# Patient Record
Sex: FEMALE | Race: WHITE | NOT HISPANIC OR LATINO | Employment: UNEMPLOYED | URBAN - METROPOLITAN AREA
[De-identification: names, ages, dates, MRNs, and addresses within clinical notes are randomized per-mention and may not be internally consistent; named-entity substitution may affect disease eponyms.]

---

## 2018-01-17 ENCOUNTER — CONVERSION ENCOUNTER (OUTPATIENT)
Dept: MAMMOGRAPHY | Facility: HOSPITAL | Age: 63
End: 2018-01-17

## 2018-01-25 ENCOUNTER — OFFICE VISIT CONVERTED (OUTPATIENT)
Dept: SURGERY | Facility: CLINIC | Age: 63
End: 2018-01-25
Attending: PHYSICIAN ASSISTANT

## 2018-01-25 ENCOUNTER — CONVERSION ENCOUNTER (OUTPATIENT)
Dept: SURGERY | Facility: CLINIC | Age: 63
End: 2018-01-25

## 2018-03-08 ENCOUNTER — PROCEDURE VISIT CONVERTED (OUTPATIENT)
Dept: UROLOGY | Facility: CLINIC | Age: 63
End: 2018-03-08
Attending: UROLOGY

## 2018-07-13 ENCOUNTER — CONVERSION ENCOUNTER (OUTPATIENT)
Dept: MAMMOGRAPHY | Facility: HOSPITAL | Age: 63
End: 2018-07-13

## 2019-01-26 ENCOUNTER — HOSPITAL ENCOUNTER (OUTPATIENT)
Dept: MAMMOGRAPHY | Facility: HOSPITAL | Age: 64
Discharge: HOME | End: 2019-01-26
Attending: NURSE PRACTITIONER

## 2019-02-11 ENCOUNTER — HOSPITAL ENCOUNTER (OUTPATIENT)
Dept: MAMMOGRAPHY | Facility: HOSPITAL | Age: 64
Discharge: HOME | End: 2019-02-11
Attending: NURSE PRACTITIONER

## 2019-02-15 ENCOUNTER — HOSPITAL ENCOUNTER (OUTPATIENT)
Dept: OTHER | Facility: HOSPITAL | Age: 64
Discharge: HOME | End: 2019-02-15
Attending: NURSE PRACTITIONER

## 2019-03-19 ENCOUNTER — HOSPITAL ENCOUNTER (OUTPATIENT)
Dept: URGENT CARE | Facility: CLINIC | Age: 64
Discharge: HOME | End: 2019-03-19

## 2019-05-16 ENCOUNTER — HOSPITAL ENCOUNTER (OUTPATIENT)
Dept: URGENT CARE | Facility: CLINIC | Age: 64
Discharge: HOME | End: 2019-05-16
Attending: PHYSICIAN ASSISTANT

## 2019-06-12 ENCOUNTER — OFFICE VISIT CONVERTED (OUTPATIENT)
Dept: UROLOGY | Facility: CLINIC | Age: 64
End: 2019-06-12
Attending: UROLOGY

## 2019-08-02 ENCOUNTER — HOSPITAL ENCOUNTER (OUTPATIENT)
Dept: URGENT CARE | Facility: CLINIC | Age: 64
Discharge: HOME | End: 2019-08-02

## 2019-08-04 LAB — BACTERIA SPEC AEROBE CULT: NORMAL

## 2019-08-07 ENCOUNTER — HOSPITAL ENCOUNTER (OUTPATIENT)
Dept: URGENT CARE | Facility: CLINIC | Age: 64
Discharge: HOME | End: 2019-08-07

## 2019-08-07 LAB — GLUCOSE BLD-MCNC: 135 MG/DL (ref 65–99)

## 2019-10-06 ENCOUNTER — HOSPITAL ENCOUNTER (OUTPATIENT)
Dept: URGENT CARE | Facility: CLINIC | Age: 64
Discharge: HOME | End: 2019-10-06
Attending: EMERGENCY MEDICINE

## 2019-12-18 ENCOUNTER — CONVERSION ENCOUNTER (OUTPATIENT)
Dept: ORTHOPEDIC SURGERY | Facility: CLINIC | Age: 64
End: 2019-12-18

## 2019-12-18 ENCOUNTER — OFFICE VISIT CONVERTED (OUTPATIENT)
Dept: ORTHOPEDIC SURGERY | Facility: CLINIC | Age: 64
End: 2019-12-18
Attending: ORTHOPAEDIC SURGERY

## 2020-01-26 ENCOUNTER — HOSPITAL ENCOUNTER (OUTPATIENT)
Dept: URGENT CARE | Facility: CLINIC | Age: 65
Discharge: HOME | End: 2020-01-26

## 2020-01-29 LAB — BACTERIA SPEC AEROBE CULT: NORMAL

## 2020-05-05 ENCOUNTER — HOSPITAL ENCOUNTER (OUTPATIENT)
Dept: GENERAL RADIOLOGY | Facility: HOSPITAL | Age: 65
Discharge: HOME | End: 2020-05-05
Attending: NURSE PRACTITIONER

## 2020-05-21 ENCOUNTER — HOSPITAL ENCOUNTER (OUTPATIENT)
Dept: MRI IMAGING | Facility: HOSPITAL | Age: 65
Discharge: HOME | End: 2020-05-21
Attending: FAMILY MEDICINE

## 2020-05-26 ENCOUNTER — OFFICE VISIT CONVERTED (OUTPATIENT)
Dept: NEUROSURGERY | Facility: CLINIC | Age: 65
End: 2020-05-26
Attending: PHYSICIAN ASSISTANT

## 2020-05-26 ENCOUNTER — CONVERSION ENCOUNTER (OUTPATIENT)
Dept: OTHER | Facility: HOSPITAL | Age: 65
End: 2020-05-26

## 2020-06-19 ENCOUNTER — HOSPITAL ENCOUNTER (OUTPATIENT)
Dept: PREADMISSION TESTING | Facility: HOSPITAL | Age: 65
Discharge: HOME | End: 2020-06-19
Attending: NEUROLOGICAL SURGERY

## 2020-06-23 ENCOUNTER — HOSPITAL ENCOUNTER (OUTPATIENT)
Dept: PERIOP | Facility: HOSPITAL | Age: 65
Setting detail: HOSPITAL OUTPATIENT SURGERY
Discharge: HOME | End: 2020-06-23
Attending: NEUROLOGICAL SURGERY

## 2020-06-26 ENCOUNTER — HOSPITAL ENCOUNTER (OUTPATIENT)
Dept: PERIOP | Facility: HOSPITAL | Age: 65
Setting detail: HOSPITAL OUTPATIENT SURGERY
Discharge: HOME | End: 2020-06-26
Attending: NEUROLOGICAL SURGERY

## 2020-06-26 LAB
GLUCOSE BLD-MCNC: 166 MG/DL (ref 65–99)
GLUCOSE BLD-MCNC: 98 MG/DL (ref 65–99)
SARS-COV-2 RNA SPEC QL NAA+PROBE: NOT DETECTED

## 2020-07-14 ENCOUNTER — OFFICE VISIT CONVERTED (OUTPATIENT)
Dept: NEUROSURGERY | Facility: CLINIC | Age: 65
End: 2020-07-14
Attending: PHYSICIAN ASSISTANT

## 2020-07-28 ENCOUNTER — OFFICE VISIT CONVERTED (OUTPATIENT)
Dept: NEUROSURGERY | Facility: CLINIC | Age: 65
End: 2020-07-28
Attending: PHYSICIAN ASSISTANT

## 2020-07-28 ENCOUNTER — CONVERSION ENCOUNTER (OUTPATIENT)
Dept: NEUROLOGY | Facility: CLINIC | Age: 65
End: 2020-07-28

## 2020-08-14 ENCOUNTER — HOSPITAL ENCOUNTER (OUTPATIENT)
Dept: OTHER | Facility: HOSPITAL | Age: 65
Discharge: HOME | End: 2020-08-14
Attending: PHYSICIAN ASSISTANT

## 2020-08-17 ENCOUNTER — HOSPITAL ENCOUNTER (OUTPATIENT)
Dept: URGENT CARE | Facility: CLINIC | Age: 65
Discharge: HOME | End: 2020-08-17
Attending: PHYSICIAN ASSISTANT

## 2020-08-17 LAB — GLUCOSE BLD-MCNC: 122 MG/DL (ref 65–99)

## 2020-09-10 ENCOUNTER — HOSPITAL ENCOUNTER (OUTPATIENT)
Dept: MRI IMAGING | Facility: HOSPITAL | Age: 65
Discharge: HOME | End: 2020-09-10
Attending: PHYSICIAN ASSISTANT

## 2020-09-10 LAB
CREAT BLD-MCNC: 0.8 MG/DL (ref 0.6–1.4)
GFR SERPLBLD BASED ON 1.73 SQ M-ARVRAT: >60 ML/MIN/{1.73_M2}

## 2020-09-26 ENCOUNTER — HOSPITAL ENCOUNTER (OUTPATIENT)
Dept: URGENT CARE | Facility: CLINIC | Age: 65
Discharge: HOME | End: 2020-09-26
Attending: EMERGENCY MEDICINE

## 2020-10-12 ENCOUNTER — OFFICE VISIT CONVERTED (OUTPATIENT)
Dept: SURGERY | Facility: CLINIC | Age: 65
End: 2020-10-12
Attending: SURGERY

## 2020-10-17 ENCOUNTER — HOSPITAL ENCOUNTER (OUTPATIENT)
Dept: PREADMISSION TESTING | Facility: HOSPITAL | Age: 65
Discharge: HOME | End: 2020-10-17
Attending: SURGERY

## 2020-10-19 LAB — SARS-COV-2 RNA SPEC QL NAA+PROBE: NOT DETECTED

## 2020-10-22 ENCOUNTER — HOSPITAL ENCOUNTER (OUTPATIENT)
Dept: PERIOP | Facility: HOSPITAL | Age: 65
Setting detail: HOSPITAL OUTPATIENT SURGERY
Discharge: HOME | End: 2020-10-22
Attending: SURGERY

## 2020-10-22 LAB
GLUCOSE BLD-MCNC: 105 MG/DL (ref 65–99)
GLUCOSE BLD-MCNC: 164 MG/DL (ref 65–99)

## 2020-11-02 ENCOUNTER — OFFICE VISIT CONVERTED (OUTPATIENT)
Dept: SURGERY | Facility: CLINIC | Age: 65
End: 2020-11-02
Attending: SURGERY

## 2020-11-19 ENCOUNTER — TELEMEDICINE CONVERTED (OUTPATIENT)
Dept: NEUROSURGERY | Facility: CLINIC | Age: 65
End: 2020-11-19
Attending: NEUROLOGICAL SURGERY

## 2020-12-17 ENCOUNTER — CONVERSION ENCOUNTER (OUTPATIENT)
Dept: SURGERY | Facility: CLINIC | Age: 65
End: 2020-12-17

## 2020-12-17 ENCOUNTER — OFFICE VISIT CONVERTED (OUTPATIENT)
Dept: SURGERY | Facility: CLINIC | Age: 65
End: 2020-12-17
Attending: SURGERY

## 2020-12-18 ENCOUNTER — HOSPITAL ENCOUNTER (OUTPATIENT)
Dept: LAB | Facility: HOSPITAL | Age: 65
Discharge: HOME | End: 2020-12-18
Attending: SURGERY

## 2020-12-18 LAB
APPEARANCE UR: CLEAR
BILIRUB UR QL: NEGATIVE
COLOR UR: YELLOW
CONV BACTERIA: NEGATIVE
CONV COLLECTION SOURCE (UA): ABNORMAL
CONV UROBILINOGEN IN URINE BY AUTOMATED TEST STRIP: 0.2 {EHRLICHU}/DL (ref 0.1–1)
GLUCOSE UR QL: 100 MG/DL
HGB UR QL STRIP: ABNORMAL
KETONES UR QL STRIP: ABNORMAL MG/DL
LEUKOCYTE ESTERASE UR QL STRIP: NEGATIVE
NITRITE UR QL STRIP: NEGATIVE
PH UR STRIP.AUTO: 5 [PH] (ref 5–8)
PROT UR QL: NEGATIVE MG/DL
RBC #/AREA URNS HPF: ABNORMAL /[HPF]
SP GR UR: 1.02 (ref 1–1.03)
WBC #/AREA URNS HPF: ABNORMAL /[HPF]

## 2021-01-06 ENCOUNTER — HOSPITAL ENCOUNTER (OUTPATIENT)
Dept: GENERAL RADIOLOGY | Facility: HOSPITAL | Age: 66
Discharge: HOME | End: 2021-01-06
Attending: NURSE PRACTITIONER

## 2021-01-14 ENCOUNTER — HOSPITAL ENCOUNTER (OUTPATIENT)
Dept: MAMMOGRAPHY | Facility: HOSPITAL | Age: 66
Discharge: HOME | End: 2021-01-14
Attending: FAMILY MEDICINE

## 2021-02-04 ENCOUNTER — CONVERSION ENCOUNTER (OUTPATIENT)
Dept: SURGERY | Facility: CLINIC | Age: 66
End: 2021-02-04

## 2021-02-04 ENCOUNTER — OFFICE VISIT CONVERTED (OUTPATIENT)
Dept: UROLOGY | Facility: CLINIC | Age: 66
End: 2021-02-04
Attending: NURSE PRACTITIONER

## 2021-02-04 LAB
BILIRUB UR QL STRIP: NEGATIVE
COLOR UR: YELLOW
CONV BACTERIA IN URINE MICRO: 0
CONV CALCIUM OXALATE CRYSTALS /HPF IN URINE SEDIMENT BY MICROSCOPY: 0
CONV CLARITY OF URINE: CLEAR
CONV PROTEIN IN URINE BY AUTOMATED TEST STRIP: NEGATIVE
CONV UROBILINOGEN IN URINE BY AUTOMATED TEST STRIP: 0.2
GLUCOSE UR QL: NEGATIVE
HGB UR QL STRIP: NORMAL
KETONES UR QL STRIP: NEGATIVE
LEUKOCYTE ESTERASE UR QL STRIP: NEGATIVE
NITRITE UR QL STRIP: NEGATIVE
PH UR STRIP.AUTO: 5 [PH]
RBC #/AREA URNS HPF: 0 /[HPF]
RENAL EPI CELLS #/AREA URNS HPF: 0 /[HPF]
SP GR UR: >=1.03
SQUAMOUS SPT QL MICRO: 0
WBC #/AREA URNS HPF: 0 /[HPF]

## 2021-03-10 ENCOUNTER — OFFICE VISIT CONVERTED (OUTPATIENT)
Dept: SURGERY | Facility: CLINIC | Age: 66
End: 2021-03-10
Attending: NURSE PRACTITIONER

## 2021-03-18 ENCOUNTER — HOSPITAL ENCOUNTER (OUTPATIENT)
Dept: GASTROENTEROLOGY | Facility: HOSPITAL | Age: 66
Setting detail: HOSPITAL OUTPATIENT SURGERY
Discharge: HOME | End: 2021-03-18
Attending: SURGERY

## 2021-03-18 LAB — GLUCOSE BLD-MCNC: 137 MG/DL (ref 65–99)

## 2021-03-25 ENCOUNTER — CONVERSION ENCOUNTER (OUTPATIENT)
Dept: SURGERY | Facility: CLINIC | Age: 66
End: 2021-03-25

## 2021-03-25 ENCOUNTER — OFFICE VISIT CONVERTED (OUTPATIENT)
Dept: SURGERY | Facility: CLINIC | Age: 66
End: 2021-03-25
Attending: NURSE PRACTITIONER

## 2021-04-29 ENCOUNTER — OFFICE VISIT CONVERTED (OUTPATIENT)
Dept: SURGERY | Facility: CLINIC | Age: 66
End: 2021-04-29
Attending: SURGERY

## 2021-05-10 NOTE — H&P
History and Physical      Patient Name: Jada Butler   Patient ID: [id number]   Sex: Female   YOB: 1955    Primary Care Provider: Ellis Mendoza MD   Referring Provider: Ellis Mendoza MD    Visit Date: May 26, 2020    Provider: Samira Barnett PA-C   Location: [facility]   Location Address: [address] [place] [address]  [id number]   Location Phone: [phone]          Chief Complaint  · Back and right leg pain      History Of Present Illness  The patient is a 64 year old /White female, who presents on referral from Ellis Mendoza MD, for a neurosurgical evaluation of low back pain and right leg pain.   The right leg pain involves the right S1 distribution. The pain developed gradually one month ago with NKI It is severe (7-8/10) has an aching and a sharp quality and radiates into the right S1 distribution. The pain has been constant. The pain tends to be maximal at no specific time, but waxes and wanes in severity throughout the day. The patient states the pain is aggravated by prolonged sitting. Improves some with heat and lying done.   She also reports paresthesias in the right lower leg posteriorly. She denies bowel or bladder dysfunction and. The patient's past medical history is notable for prior lumbar spine surgery. She underwent a laminotomy at L4-5 on the left approximately 4 weeks ago.   RECENT INTERVENTIONS:  She has been previously treated with pain medication and oral prednisone (caused headache so d/c prednisone).   INFORMATION REVIEWED:  The following information was reviewed: radiology reports and images and. MRI of the lumbar spine and revealed right L5/S1 disc protrusion with moderate central canal stenosis at L4/5 with history of laminectomy at L4/5. She has multilevel degenerative changes. These were the most notable findings.       Past Medical History  Allergic rhinitis; Anemia, Unspecified; Arthritis; Degeneration of lumbar intervertebral disc;  Diabetes; Diabetes mellitus, type II; Displacement of lumbar intervertebral disc; GERD; History of arthroscopy of left knee; Hyperlipemia; Hyperlipidemia; Hypertension; Impaired fasting glucose; Insomnia; Lateral meniscus tear, current, left; Left Knee Osteoarthritis ; Low back pain; Lower abdominal pain; Lumbago/low back pain; Lumbar Spinal Stenosis; Microscopic hematuria; Mood swings; Radiculopathy, lumbosacral; Reflux; Seasonal allergies; Ulcer         Past Surgical History  Artificial Joints/Limbs; Back; breast reduction; Colonoscopy; Cystoscopy; Hernia; Hernia Repair; Hysterectomy; Joint Surgery; Lumbar laminectomy; Metal implants; Sinus Surgery; Tubal ligation; Wrist Surgery         Medication List  Claritin oral; dicyclomine 10 mg oral capsule; dicyclomine 20 mg oral tablet; fluoxetine 20 mg oral capsule; glimepiride 2 mg oral tablet; losartan 100 mg oral tablet; metformin 500 mg oral tablet extended release 24 hr; omeprazole 40 mg oral capsule,delayed release(DR/EC); pravastatin 20 mg oral tablet; Prilosec oral; propranolol 80 mg oral capsule,extended release 24 hr; Prozac 20 mg oral capsule; Vitamin D2 50,000 unit oral capsule         Allergy List  Augmentin; Mobic       Allergies Reconciled  Family Medical History  Stroke; Heart Disease; Cancer, Unspecified; Diabetes, unspecified type; Family history of certain chronic disabling diseases; arthritis; Family history of Arthritis         Reproductive History   0 Para 0 0 0 0 & Postmenopausal       Social History  Alcohol Use (Current some day); Homemaker.; lives with spouse; .; Recreational Drug Use (Never); Retired.; Tobacco (Former); Unemployed.         Review of Systems  · Constitutional  o Denies  o : chills, excessive sweating, fatigue, fever, sycope/passing out, weight gain, weight loss  · Eyes  o Denies  o : changes in vision, blurry vision, double vision  · HENT  o Admits  o : ringing in the ears, seasonal allergies  o Denies  o : loss  of hearing, ear aches, sore throat, nasal congestion, sinus pain, nose bleeds  · Cardiovascular  o Denies  o : blood clots, swollen legs, anemia, easy burising or bleeding, transfusions  · Respiratory  o Denies  o : shortness of breath, dry cough, productive cough, pneumonia, COPD  · Gastrointestinal  o Admits  o : reflux  o Denies  o : difficulty swallowing  · Genitourinary  o Denies  o : incontinence  · Neurologic  o Admits  o : tremor  o Denies  o : headache, seizure, stroke, loss of balance, falls, dizziness/vertigo, difficulty with sleep, numbness/tingling/paresthesia , difficulty with coordination, difficulty with dexterity, weakness  · Musculoskeletal  o Admits  o : sciatica, pain radiating in leg, low back pain  o Denies  o : neck stiffness/pain, swollen lymph nodes, muscle aches, joint pain, weakness, spasms, pain radiating in arm  · Endocrine  o Admits  o : diabetes  o Denies  o : thyroid disorder  · Psychiatric  o Denies  o : anxiety, depression  · All Others Negative      Vitals  Date Time BP Position Site L\R Cuff Size HR RR TEMP (F) WT  HT  BMI kg/m2 BSA m2 O2 Sat HC       05/26/2020 01:49 PM        97.5         05/26/2020 02:26 /66 Sitting    73 - R  97.5 175lbs 2oz              Physical Examination  · Constitutional  o Appearance  o : well-nourished, well developed, alert, in no acute distress  · Respiratory  o Respiratory Effort  o : breathing unlabored  · Cardiovascular  o Peripheral Vascular System  o :   § Extremities  § : no edema or cyanosis  · Musculoskeletal  o Spine  o :   § Inspection/Palpation  § : ttp in the lower lumbar region  o Right Lower Extremity  o :   § Inspection/Palpation  § : no joint or limb tenderness to palpation, no edema present, no ecchymosis  § Joint Stability  § : joint stability within normal limits  § Range of Motion  § : range of motion normal, no joint crepitations present, no pain on motion, Sheng's test negative  o Left Lower Extremity  o :    § Inspection/Palpation  § : no joint or limb tenderness to palpation, no edema present, no ecchymosis  § Joint Stability  § : joint stability within normal limits  § Range of Motion  § : range of motion normal, no joint crepitations present, no pain on motion, Sheng's test negative  · Skin and Subcutaneous Tissue  o Extremities  o :   § Right Lower Extremity  § : no lesions or areas of discoloration  § Left Lower Extremity  § : no lesions or areas of discoloration  o Back  o : no lesions or areas of discoloration  · Neurologic  o Mental Status Examination  o :   § Orientation  § : alert and oriented to time, person, place and events  o Motor Examination  o :   § RLE Strength  § : strength normal  § RLE Motor Function  § : tone normal, no atrophy, no abnormal movements noted  § LLE Strength  § : strength normal  § LLE Motor Function  § : tone normal, no atrophy, no abnormal movements noted  o Reflexes  o :   § RLE  § : knee reflexes 1/4 knee and 0 at ankle, SLR positive  § LLE  § : knee 0/4 and 1/4 ankle, SLR negative  o Sensation  o :   § Light Touch  § : sensation intact to light touch in extremities  o Gait and Station  o :   § Gait Screening  § : normal gait, able to stand without difficulty  · Psychiatric  o Mood and Affect  o : mood normal, affect appropriate          Assessment  · Pre-op examination     V72.84/Z01.818  · Degeneration of lumbar intervertebral disc     722.52/M51.36  · Lumbago/low back pain     724.3/M54.40  · Lumbar Spinal Stenosis     724.02/M48.06  · Lumbosacral disc herniation, L5-S1     722.10/M51.27    Problems Reconciled  Plan  · Orders  o ISIS Report ([facility]) - - 05/26/2020  · Medications  o tramadol 50 mg oral tablet   SIG: take 1 tablet by oral route 3 times a day as needed for 15 days   DISP: (45) tablets with 0 refills  Prescribed on 05/26/2020     o Medications have been Reconciled  o Transition of Care or Provider Policy  · Instructions  o ****Surgical  Orders****  o Outpatient  o RISK AND BENEFITS:  o Possible risks/complications, benefits and alternatives to surgical or invasive procedure have been explained to the patient and/or legal guardian.  o Patient has been evaluated and can tolerate anesthesia and/or sedation. Risks, benefits, and alternatives to anesthesia and sedation have been explained to the patient and/or legal guardian.  o ***************  o PREP: Per protocol;  o IV: Per Anesthesia;  o *******************************************  o PRE- OP MEDICATION ORDER:  o *******************************************  o Clindamycin 900 mg IV on call to OR.  o Date of Surgical Procedure:   o Please sign permit for:  o Right Approach Minimally Invasive Discetomy,  o lumbar five to sacral one  o The above History and Physical must have been completed within 30 days of admission.  o Encouraged to follow-up with Primary Care Provider for preventative care.  o The ROS and the PFSH were reviewed at today's visit.  o I have discussed the risks and benefits of surgery versus physical therapy, epidural steroids, and other conservative forms of treatment.  o Call or return to office if symptoms worsen or persist.  o Dr. Cueva also saw her in clinic today and discussed with her the option of a right L5/S1 MID. She would like to proceed with surgery.   · Associate Tasks  o Task ID [id number] General Task: david and Dr. Cueva said ok to call in tramadol to her pharmacy per facesheet  o Task ID [id number] General Task: surgery needs to be pre-cert and then scheduled            Electronically Signed by: ALMA Diaz-YESSENIA -Author on May 26, 2020 03:03:49 PM  Electronically Co-signed by: Carlyle Cueva MD -Reviewer on May 26, 2020 04:11:22 PM

## 2021-05-10 NOTE — H&P
History and Physical      Patient Name: Jada Butler   Patient ID: [id number]   Sex: Female   YOB: 1955    Primary Care Provider: Coleen Coelho MD   Referring Provider: COLEEN COELHO MD    Visit Date: December 17, 2020    Provider: Tereso Shaffer MD   Location: [facility]   Location Address: [address] [place] [address]  [id number]   Location Phone: [phone]          Chief Complaint  · Flank Pain      History Of Present Illness     Ms. Butler presents with pain status post laparoscopic cholecystectomy.  Her pain is left flank in nature.       Past Medical History  Allergic rhinitis; Allergic rhinitis, chronic; Anemia, Unspecified; Arthritis; Arthritis; Bladder Disorder; Degeneration of lumbar intervertebral disc; Diabetes; Diabetes Mellitus, Type II; Displacement of lumbar intervertebral disc; GERD; High blood pressure; High cholesterol; History of arthroscopy of left knee; Hyperlipemia; Hyperlipidemia; Hypertension; Impaired fasting glucose; Insomnia; Kidney stones; Lateral meniscus tear, current, left; Left Knee Osteoarthritis ; Low back pain; Lower abdominal pain; Lumbago/low back pain; Lumbar Spinal Stenosis; Lumbar Spinal Stenosis; Lumbosacral disc herniation, L5-S1; Microscopic hematuria; Mood swings; Post-operative pain; Radiculopathy, lumbosacral; Reflux; Seasonal allergies; Ulcer         Past Surgical History  *Metal Implant; Artificial Joints/Limbs; Back; Breast; breast reduction; Colonoscopy; Cystoscopy; Gallbladder; Hernia; Hernia Repair; Hysterectomy; Hysterectomy-Abdominal; Joint Surgery; Lumbar laminectomy; Metal implants; Minimally invasive Discectomy; Sinus Surgery; Tubal ligation; Wrist Surgery         Medication List  amlodipine 5 mg oral tablet; Claritin oral; glimepiride 2 mg oral tablet; losartan 100 mg oral tablet; metformin 500 mg oral tablet extended release 24 hr; pravastatin 20 mg oral tablet; propranolol 80 mg oral capsule,extended release 24 hr;  "Prozac 20 mg oral capsule; Vitamin D2 50,000 unit oral capsule         Allergy List  Augmentin; Mobic         Family Medical History  Stroke; Heart Disease; Cancer, Unspecified; Diabetes, unspecified type; Prostate cancer; Renal Calculus; Bladder calculus; Family history of certain chronic disabling diseases; arthritis; Family history of Arthritis         Reproductive History   0 Para 0 0 0 0 & Postmenopausal       Social History  Alcohol Use; Homemaker.; lives with spouse; .; Recreational Drug Use (Never); Retired.; Tobacco (Former); Unemployed.         Review of Systems  · Cardiovascular  o Denies  o : chest pain on exertion, shortness of breath, lower extremity swelling  · Respiratory  o Denies  o : wheezing, chronic cough, coughing up blood  · Gastrointestinal  o Denies  o : diarrhea, chronic abdominal pain, reflux symptoms      Vitals  Date Time BP Position Site L\R Cuff Size HR RR TEMP (F) WT  HT  BMI kg/m2 BSA m2 O2 Sat FR L/min FiO2 HC       2020 05:24 PM       14  179lbs 0oz 5'  3\" 31.71 1.9             Physical Examination     Her incisions are well healed.  There is no evidence of hernias.           Assessment  · Flank pain     789.09/R10.9  · Dysuria     788.1/R30.0      Plan  · Orders  o Urinalysis with Reflex Microscopy if abnormal ([facility]) (09332) - 789.09/R10.9, 788.1/R30.0 - 2020  · Medications  o Medications have been Reconciled  o Transition of Care or Provider Policy     Her pain is left flank in nature. I have recommended urinalysis and urology evaluation as this does not appear to be related to her gallbladder surgery.             Electronically Signed by: Inez Calderon-, Other -Author on 2020 01:45:02 PM  Electronically Co-signed by: Tereso Shaffer MD -Reviewer on 2021 08:57:06 AM  "

## 2021-05-10 NOTE — H&P
History and Physical      Patient Name: Jada Butler   Patient ID: [id number]   Sex: Female   YOB: 1955    Primary Care Provider: Coleen Coelho MD   Referring Provider: COLEEN COELHO MD    Visit Date: October 12, 2020    Provider: Tereso Shaffer MD   Location: [facility]   Location Address: [address] [place] [address]  [id number]   Location Phone: [phone]          Chief Complaint  · Outpatient History & Physical / Surgical Orders  · Gallbladder Consult      History Of Present Illness     Ms. Butler is a 64-year-old female who presents with right upper quadrant pain and gallstones. She notes increasing symptoms.       Past Medical History  Allergic rhinitis; Allergic rhinitis, chronic; Anemia, Unspecified; Arthritis; Arthritis; Bladder Disorder; Degeneration of lumbar intervertebral disc; Diabetes; Diabetes Mellitus, Type II; Displacement of lumbar intervertebral disc; GERD; High blood pressure; High cholesterol; History of arthroscopy of left knee; Hyperlipemia; Hyperlipidemia; Hypertension; Impaired fasting glucose; Insomnia; Lateral meniscus tear, current, left; Left Knee Osteoarthritis ; Low back pain; Lower abdominal pain; Lumbago/low back pain; Lumbar Spinal Stenosis; Microscopic hematuria; Mood swings; Radiculopathy, lumbosacral; Reflux; Seasonal allergies; Ulcer         Past Surgical History  *Metal Implant; Artificial Joints/Limbs; Back; Breast; breast reduction; Colonoscopy; Cystoscopy; Hernia; Hernia Repair; Hysterectomy; Hysterectomy-Abdominal; Joint Surgery; Lumbar laminectomy; Metal implants; Minimally invasive Discectomy; Sinus Surgery; Tubal ligation; Wrist Surgery         Medication List  Claritin oral; glimepiride 2 mg oral tablet; losartan 100 mg oral tablet; metformin 500 mg oral tablet extended release 24 hr; Norco 5-325 mg oral tablet; omeprazole 40 mg oral capsule,delayed release(DR/EC); pravastatin 20 mg oral tablet; Prilosec oral; propranolol 80 mg  "oral capsule,extended release 24 hr; Prozac 20 mg oral capsule; Vitamin D2 50,000 unit oral capsule; Zofran 4 mg oral tablet         Allergy List  Augmentin; Mobic       Allergies Reconciled  Family Medical History  Stroke; Heart Disease; Cancer, Unspecified; Diabetes, unspecified type; Prostate cancer; Renal Calculus; Bladder calculus; Family history of certain chronic disabling diseases; arthritis; Family history of Arthritis         Reproductive History   0 Para 0 0 0 0 & Postmenopausal       Social History  Alcohol Use (Current some day); Homemaker.; lives with spouse; .; Recreational Drug Use (Never); Retired.; Tobacco (Former); Unemployed.         Review of Systems  · Cardiovascular  o Denies  o : chest pain on exertion, shortness of breath, lower extremity swelling  · Respiratory  o Denies  o : wheezing, chronic cough, coughing up blood  · Gastrointestinal  o Denies  o : diarrhea, chronic abdominal pain, reflux symptoms      Vitals  Date Time BP Position Site L\R Cuff Size HR RR TEMP (F) WT  HT  BMI kg/m2 BSA m2 O2 Sat FR L/min FiO2        10/12/2020 03:19 PM       14  172lbs 0oz 5'  3\" 30.47 1.86             Physical Examination  · Constitutional  o Appearance  o : reveals patient to be in no acute distress  · Head and Face  o HEENT  o : shows sclera to be nonicteric  · Respiratory  o Respiratory  o : chest is clear  · Cardiovascular  o Heart  o : regular rate and rhythm without murmurs, gallops or rubs  · Gastrointestinal  o Abdominal Examination  o :   § Abdomen  § : abdomen is soft and nontender, bowel sounds are present, no masses, gaurding or rebound were noted   · Musculoskeletal  o Extremeties/Joint  o : extremities show a ful range of motion  · Neurologic  o Neurologic/Reflexes  o : intact          Assessment  · Cholelithiasis     574.20/K80.20  · Pre-op testing     V72.84/Z01.818  · Nausea     787.02/R11.0  · Left upper quadrant abdominal " pain     789.02/R10.12      Plan  · Orders  o [facility] Pre-Op Covid-19 Screening (00868) - V72.84/Z01.818 - 10/17/2020  o Surgery Order ([facility]) - 787.02/R11.0 - 10/22/2020  · Medications  o Medications have been Reconciled  o Transition of Care or Provider Policy  · Instructions  o PLAN:  o Surgical Facility: [facility]  o ****Surgical Orders****  o RISK AND BENEFITS:  o Consent for surgery: Given these options, the patient has verbally expressed an understanding of the risks of surgery and finds these risks acceptable. We will proceed with surgery as soon as possible.  o Consult Anesthesia for any post operative block, or any pain management procedure deemed necessary by the anesthesiologist for adequate post-operative pain control.  o O.R. PREP: Per protocol  o IV: LR@ 75ml/hr  o PLEASE SIGN PERMIT FOR: Laparoscopic cholecystectomy, possible open procedure, EGD with possible biopsies  o Kefzol 1 gram IV on call to OR.  o The above History and Physical Examination has been completed within 30 days of admission.  o ****Patient Status****  o Outpatient  o Pre-Admission Testing Date: Phone Screen 10/16/20 @10am            Electronically Signed by: Rola Fang-, -Author on October 14, 2020 02:14:13 PM  Electronically Co-signed by: Tereso Shaffer MD -Reviewer on October 20, 2020 10:42:25 AM

## 2021-05-10 NOTE — H&P
History and Physical      Patient Name: Jada Butler   Patient ID: [id number]   Sex: Female   YOB: 1955    Primary Care Provider: Ellis Mendoza MD   Referring Provider: Rain MONET    Visit Date: March 10, 2021    Provider: LURDES Tripathi   Location: [facility]   Location Address: [address] [place] [address]  [id number]   Location Phone: [phone]          Chief Complaint  · Requesting colonoscopy  · Age 50 or over  · Abdominal Pain     mucus stools       History Of Present Illness  The patient is a 65 year old /White female presenting to the Surgical Specialist office on a referral from Rain MONET.   Jada Butler needs to have a diagnostic colonoscopy.   Patient states that they have had a colonoscopy. 5 years ago   Patient currently complains of: abdominal pain and mucus stools   Patient Does not have family history of colon cancer.      Patient presents today on referral from Rain Diego for left lower quadrant pain and mucus stools.  Patient reports that she has been with left lower quadrant pain since around December.  She has recently started having real mucousy stools.  CT scan was negative for any acute diverticulitis or colitis.  She denies any rectal bleeding.  Denies any family history of colorectal cancer.    5/16: EGD & Colonoscopy ([facility]): Hiatal hernia; internal hemorrhoid.       Past Medical History  Disease Name Date Onset Notes   Abdominal pain, left lower quadrant 02/04/2021 --    Allergic rhinitis --  --    Allergic rhinitis, chronic --  --    Anemia, Unspecified --  --    Arthritis --  --    Arthritis --  --    Bladder disorder --  --    Degeneration of lumbar intervertebral disc 08/17/2015 --    Diabetes --  --    Diabetes Mellitus, Type II 03/14/2014 --    Displacement of lumbar intervertebral disc 08/17/2015 --    GERD --  --    High blood pressure --  --    High cholesterol --  --    History of arthroscopy of left  knee 09/01/2016 --    Hyperlipemia --  --    Hyperlipidemia 03/04/2014 --    Hypertension --  --    Impaired fasting glucose 03/04/2014 --    Insomnia --  --    Kidney stones --  --    Lateral meniscus tear, current, left 05/10/2016 --    Left Knee Osteoarthritis  09/14/2016 --    Low back pain 01/25/2018 --    Lower abdominal pain 01/25/2018 --    Lumbago/low back pain --  --    Lumbar Spinal Stenosis 08/17/2015 moderate to severe at L4/5   Lumbar Spinal Stenosis 11/19/2020 --    Lumbosacral disc herniation, L5-S1 11/19/2020 --    Microscopic hematuria 01/25/2018 --    Mood swings --  --    Post-operative pain 11/19/2020 --    Radiculopathy, lumbosacral 08/17/2015 left   Reflux --  --    Seasonal allergies --  --    Ulcer --  --          Past Surgical History  Procedure Name Date Notes   *Metal Implant --  --    Artificial Joints/Limbs --  --    Back --  --    Breast --  --    breast reduction --  --    Colonoscopy --  --    Cystoscopy 3-8-18 --    Gallbladder --  --    Hernia --  --    Hernia Repair 1998 --    Hysterectomy 1996 --    Hysterectomy-Abdominal --  --    Joint Surgery --  --    Lumbar laminectomy 2/19/2016 L4-5   Metal implants --  --    Minimally invasive Discectomy 6-26-20 Right L5-S1   Sinus Surgery --  --    Tubal ligation --  --    Wrist Surgery 11/6/14 fracture repair         Medication List  Name Date Started Instructions   amlodipine 5 mg oral tablet  take 1 tablet (5 mg) by oral route once daily   Claritin oral  --    famotidine 20 mg oral tablet  take 1 tablet (20 mg) by oral route once daily at bedtime   glimepiride 2 mg oral tablet  take 1 tablet (2 mg) by oral route once daily   losartan 100 mg oral tablet  take 1 tablet (100 mg) by oral route once daily   metformin 500 mg oral tablet extended release 24 hr 01/05/2015 take 1 tablet by oral route 2 times a day   pravastatin 20 mg oral tablet 01/05/2015 take 1 tablet (20 mg) by oral route once daily at bedtime   propranolol 80 mg oral  capsule,extended release 24 hr  take 1 capsule (80 mg) by oral route once daily   Prozac 20 mg oral capsule  take 2 capsules (40 mg) by oral route once daily in the evening   Questran 4 gram oral powder in packet 2021 take 1 packet (4 gram) dissolved in 2 to 6 ounces of water or noncarbonated beverage by oral route 1 time per day   Vitamin D2 50,000 unit oral capsule  take 1 capsule (50,000 unit) by oral route once weekly         Allergy List  Allergen Name Date Reaction Notes   Augmentin --  vomiting reports she has vomiting.   Mobic --  --  --        Allergies Reconciled  Family Medical History  Disease Name Relative/Age Notes   Stroke Father/  Mother/   Mother; Father   Heart Disease Brother/  Father/  Mother/   Mother; Father; Brother  Father; Mother   Cancer, Unspecified Mother/   Mother  Mother; Brother   Diabetes, unspecified type Father/   Father; Uncle (maternal)  Father   Prostate cancer Brother/   Brother   Renal Calculus Mother/   Mother   Bladder calculus Brother/  Mother/   Mother; Brother   Family history of certain chronic disabling diseases; arthritis Mother/   Mother   Family history of Arthritis Mother/   Mother         Reproductive History  Menstrual   Menopause Status: Postmenopausal HRT?: No   Pregnancy Summary   Total Pregnancies: 0 Full Term: 0 Premature: 0   Ab Induced: 0 Ab Spontaneous: 0 Ectopics: 0   Multiples: 0 Livin         Social History  Finding Status Start/Stop Quantity Notes   Alcohol Use --  --/-- --  2020 - rarely drinks   Homemaker. --  --/-- --  --    lives with spouse --  --/-- --  --    . --  --/-- --  --    Recreational Drug Use Never --/-- --  no   Retired. --  --/-- --  --    Tobacco Former --/-- --  2020 - former smoker  former smoker  former smoker  Quit    Unemployed. --  --/-- --  --          Review of Systems  · Constitutional  o Denies  o : fever, chills  · Eyes  o Denies  o : yellowish discoloration of  "eyes  · HENT  o Denies  o : difficulty swallowing  · Cardiovascular  o Denies  o : chest pain, chest pain on exertion  · Respiratory  o Denies  o : shortness of breath  · Gastrointestinal  o Admits  o : abdominal pain, additional gastrointestinal symptoms except as noted in the HPI  o Denies  o : nausea, vomiting, diarrhea, constipation  · Genitourinary  o Denies  o : abnormal color of urine  · Integument  o Denies  o : rash  · Neurologic  o Denies  o : tingling or numbness  · Musculoskeletal  o Denies  o : joint pain  · Endocrine  o Denies  o : weight gain, weight loss      Vitals  Date Time BP Position Site L\R Cuff Size HR RR TEMP (F) WT  HT  BMI kg/m2 BSA m2 O2 Sat FR L/min FiO2 HC       03/10/2021 02:16 PM       14  179lbs 0oz 5'  3\" 31.71 1.9             Physical Examination  · Constitutional  o Appearance  o : well developed, well-nourished, patient in no apparent distress  · Head and Face  o Head  o :   § Inspection  § : atraumatic, normocephalic  o Face  o :   § Inspection  § : no facial lesions  · Eyes  o Conjunctivae  o : conjunctivae normal  o Sclerae  o : sclerae white  · Neck  o Inspection/Palpation  o : normal appearance, no masses or tenderness, trachea midline  · Respiratory  o Respiratory Effort  o : breathing unlabored  · Skin and Subcutaneous Tissue  o General Inspection  o : no lesions present, no areas of discoloration, skin turgor normal, texture normal  · Neurologic  o Mental Status Examination  o :   § Orientation  § : grossly oriented to person, place and time  § Attention  § : attention normal, concentration abilities normal  § Fund of Knowledge  § : fund of knowledge within normal limits, patient aware of current events  o Gait and Station  o : normal gait, able to stand without difficulty  · Psychiatric  o Judgement and Insight  o : judgment and insight intact  o Mood and Affect  o : mood normal, affect appropriate     Abdomen: Left lower quadrant with tenderness noted.  All other " quadrants are normal.               Assessment  · Abdominal Pain, LLQ     789.04/R10.32  · Change in bowel habit     787.99/R19.4    Problems Reconciled  Plan  · Orders  o Consent for Colonoscopy with Possible Biopsy - Possible risks/complications, benefits, and alternatives to surgical or invasive procedure have been explained to patient and/or legal guardian. -Patient has been evaluated and can tolerate anesthesia and/or sedation. Risks, benefits, and alternatives to anesthesia and sedation have been explained to patient and/or legal guardian. ([id number]) - 787.99/R19.4, 789.04/R10.32 - 03/18/2021  · Medications  o Medications have been Reconciled  o Transition of Care or Provider Policy  · Instructions  o Surgical Facility: [facility]  o Handouts Provided Pre-Procedure Instructions including date, time, and location of procedure.   o PLAN: Proceeed with colonoscopy. Patient understands risks/benefits and is willing to proceed.   o ***Surgical Orders***  o RISK AND BENEFITS:  o Given these options, the patient has verbally expressed an understanding of the risks of the surgery and finds these risks acceptable. Will proceed with surgery as soon as possible.  o O.R. PREP: Per protocol   o IV: Per Anesthesia  o Please sign permit for: Colonoscopy with possible biopsies by Dr. Norris.  o The above History and Physical Examination has been completed within 30 days of admission.  o ***Patient Status***  o Outpatient  o Follow up in the in the office post procedure.  o Electronically Identified Patient Education Materials Provided Electronically            Electronically Signed by: LURDES Tripathi -Author on March 10, 2021 02:46:18 PM

## 2021-05-11 NOTE — H&P
History and Physical      Patient Name: Jada Butler   Patient ID: [id number]   Sex: Female   YOB: 1955    Primary Care Provider: Ellis Mendoza MD   Referring Provider: Rain MONET    Visit Date: April 29, 2021    Provider: Joon Norris MD   Location: [facility]   Location Address: [address] [place] [address]  [id number]   Location Phone: [phone]          Chief Complaint  · Outpatient History & Physical / Surgical Orders  · Hemorrhoids      History Of Present Illness  Jada Butler is a 65 year old /White female who presents to the office today as a consult from Rain MONET.      No tobacco use.    She has a history of a lump to the right side of her anus since her colonoscopy in March 2021.  Her colonoscopy was normal.  Occasional pain to the site.  No bleeding.  History of the same with childbirth in the distant past.  No blood thinner use.  No fiber use.  On and off the toilet quickly.  This has not improved with multiple over-the-counter medications.  No exacerbating factors.       Past Medical History  Disease Name Date Onset Notes   Abdominal pain, left lower quadrant 02/04/2021 --    Allergic rhinitis --  --    Allergic rhinitis, chronic --  --    Anemia, Unspecified --  --    Arthritis --  --    Arthritis --  --    Bladder disorder --  --    Degeneration of lumbar intervertebral disc 08/17/2015 --    Diabetes --  --    Diabetes Mellitus, Type II 03/14/2014 --    Displacement of lumbar intervertebral disc 08/17/2015 --    GERD --  --    High blood pressure --  --    High cholesterol --  --    History of arthroscopy of left knee 09/01/2016 --    Hyperlipemia --  --    Hyperlipidemia 03/04/2014 --    Hypertension --  --    Impaired fasting glucose 03/04/2014 --    Insomnia --  --    Kidney Stones --  --    Lateral meniscus tear, current, left 05/10/2016 --    Left Knee Osteoarthritis  09/14/2016 --    Low back pain 01/25/2018 --    Lower  abdominal pain 01/25/2018 --    Lumbago/low back pain --  --    Lumbar Spinal Stenosis 08/17/2015 moderate to severe at L4/5   Lumbar Spinal Stenosis 11/19/2020 --    Lumbosacral disc herniation, L5-S1 11/19/2020 --    Microscopic hematuria 01/25/2018 --    Mood swings --  --    Post-operative pain 11/19/2020 --    Radiculopathy, lumbosacral 08/17/2015 left   Reflux --  --    Seasonal allergies --  --    Ulcer --  --          Past Surgical History  Procedure Name Date Notes   *Metal Implant --  --    Artificial Joints/Limbs --  --    Back --  --    Breast --  --    breast reduction --  --    Colonoscopy --  --    Cystoscopy 3-8-18 --    Gallbladder --  --    Hernia --  --    Hernia Repair 1998 --    Hysterectomy 1996 --    Hysterectomy-Abdominal --  --    Joint Surgery --  --    Lumbar laminectomy 2/19/2016 L4-5   Metal implants --  --    Minimally invasive Discectomy 6-26-20 Right L5-S1   Sinus Surgery --  --    Tubal ligation --  --    Wrist Surgery 11/6/14 fracture repair         Medication List  Name Date Started Instructions   amlodipine 5 mg oral tablet  take 1 tablet (5 mg) by oral route once daily   Celebrex 50 mg oral capsule  take 1 capsule (50 mg) by oral route 2 times per day   Claritin oral  --    famotidine 20 mg oral tablet  take 1 tablet (20 mg) by oral route once daily at bedtime   glimepiride 2 mg oral tablet  take 1 tablet (2 mg) by oral route once daily   losartan 100 mg oral tablet  take 1 tablet (100 mg) by oral route once daily   metformin 500 mg oral tablet extended release 24 hr 01/05/2015 take 1 tablet by oral route 2 times a day   pravastatin 20 mg oral tablet 01/05/2015 take 1 tablet (20 mg) by oral route once daily at bedtime   propranolol 80 mg oral capsule,extended release 24 hr  take 1 capsule (80 mg) by oral route once daily   Prozac 20 mg oral capsule  take 2 capsules (40 mg) by oral route once daily in the evening   Questran 4 gram oral powder in packet 01/26/2021 take 1 packet (4  gram) dissolved in 2 to 6 ounces of water or noncarbonated beverage by oral route 1 time per day   Vitamin D2 50,000 unit oral capsule  take 1 capsule (50,000 unit) by oral route once weekly         Allergy List  Allergen Name Date Reaction Notes   Augmentin --  vomiting reports she has vomiting.   Mobic --  --  --          Family Medical History  Disease Name Relative/Age Notes   Stroke Father/  Mother/   Mother; Father   Heart Disease Brother/  Father/  Mother/   Mother; Father; Brother  Father; Mother   Cancer, Unspecified Mother/   Mother  Mother; Brother   Diabetes, unspecified type Father/   Father; Uncle (maternal)  Father   Prostate cancer Brother/   Brother   Renal Calculus Mother/   Mother   Bladder calculus Brother/  Mother/   Mother; Brother   Family history of certain chronic disabling diseases; arthritis Mother/   Mother   Family history of Arthritis Mother/   Mother         Reproductive History  Menstrual   Menopause Status: Postmenopausal HRT?: No   Pregnancy Summary   Total Pregnancies: 0 Full Term: 0 Premature: 0   Ab Induced: 0 Ab Spontaneous: 0 Ectopics: 0   Multiples: 0 Livin         Social History  Finding Status Start/Stop Quantity Notes   Alcohol Use --  --/-- --  2020 - rarely drinks   Homemaker. --  --/-- --  --    lives with spouse --  --/-- --  --    . --  --/-- --  --    Recreational Drug Use Never --/-- --  no   Retired. --  --/-- --  --    Tobacco Former --/-- --  2020 - former smoker  former smoker  former smoker  Quit 1993   Unemployed. --  --/-- --  --          Review of Systems  · Constitutional  o Denies  o : fever, chills  · Eyes  o Denies  o : yellowish discoloration of the eyes  · HENT  o Denies  o : nose bleeding, difficulty swallowing  · Cardiovascular  o Denies  o : chest pain on exertion  · Respiratory  o Denies  o : shortness of breath  · Gastrointestinal  o Denies  o : nausea, vomiting  · Genitourinary  o Denies  o : abnormal color of urine,  "urinary leakage  · Integument  o Denies  o : rash, skin lesion or lump  · Neurologic  o Denies  o : tingling or numbness  · Musculoskeletal  o Denies  o : joint pain  · Endocrine  o Denies  o : weight gain, weight loss      Vitals  Date Time BP Position Site L\R Cuff Size HR RR TEMP (F) WT  HT  BMI kg/m2 BSA m2 O2 Sat FR L/min FiO2        04/29/2021 01:24 PM       14  176lbs 4oz 5'  3\" 31.22 1.89             Physical Examination  · Constitutional  o Appearance  o : healthy appearing, alert and in no acute distress, reliable historian  · Head and Face  o Head  o :   § Inspection  § : no visable deformities or lesions  · Eyes  o Conjunctivae  o : clear  o Sclerae  o : clear  · Neck  o Inspection/Palpation  o : normal appearance, no masses, trachea midline  · Respiratory  o Respiratory Effort  o : breathing unlabored, respiratory effort appears normal  o Inspection of Chest  o : normal appearance, no retractions  · Cardiovascular  o Heart  o : regular rate and rhythm  · Gastrointestinal  o Abdominal Examination  o :   § Abdomen  § : soft, nondistended, resolving thrombosed hemorrhoid right posterior without evidence of infection, no overlying skin changes, minimal tenderness to palpation  · Skin and Subcutaneous Tissue  o General Inspection  o : no visible concerning rashes or lesions present  · Neurologic  o Cranial Nerves  o : no obvious motor deficits  o Sensation  o : no obvious sensory deficits  o Gait and Station  o :   § Gait Screening  § : normal gait, able to stand without diffculty  o Cerebellar Function  o : no obvious abnormalities  · Psychiatric  o Judgement and Insight  o : judgment and insight intact  o Mood and Affect  o : mood normal, affect appropriate          Assessment  · Thrombosed external hemorrhoid     455.4/K64.5      Plan  · Medications  o Medications have been Reconciled  o Transition of Care or Provider Policy  · Instructions  o Electronically Identified Patient Education Materials " Provided Electronically     I had a discussion with the patient.  I explained her this looks like a nonresolving thrombosed external hemorrhoid.  With her symptoms offered excision in the office under local anesthesia.  I explained the procedure and recovery.  Benefits and alternatives discussed.  Risk of procedure including risk of anesthesia, bleeding, infection, pain, recurrence were discussed.  All questions answered.  She agrees with the plan.  She will schedule this in the near future.  Thank you for this consult.             Electronically Signed by: Joon Norris MD -Author on April 29, 2021 03:08:12 PM

## 2021-05-13 NOTE — PROGRESS NOTES
Progress Note      Patient Name: Jada Butler   Patient ID: [id number]   Sex: Female   YOB: 1955    Primary Care Provider: Coleen Coelho MD   Referring Provider: COLEEN COELHO MD    Visit Date: November 2, 2020    Provider: Tereso Shaffer MD   Location: [facility]   Location Address: [address] [place] [address]  [id number]   Location Phone: [phone]          Chief Complaint  · Follow Up Surgery      History Of Present Illness     Ms. Butler is seen in follow-up status post laparoscopic cholecystectomy.       Past Medical History  Allergic rhinitis; Allergic rhinitis, chronic; Anemia, Unspecified; Arthritis; Arthritis; Bladder Disorder; Degeneration of lumbar intervertebral disc; Diabetes; Diabetes Mellitus, Type II; Displacement of lumbar intervertebral disc; GERD; High blood pressure; High cholesterol; History of arthroscopy of left knee; Hyperlipemia; Hyperlipidemia; Hypertension; Impaired fasting glucose; Insomnia; Lateral meniscus tear, current, left; Left Knee Osteoarthritis ; Low back pain; Lower abdominal pain; Lumbago/low back pain; Lumbar Spinal Stenosis; Microscopic hematuria; Mood swings; Radiculopathy, lumbosacral; Reflux; Seasonal allergies; Ulcer         Past Surgical History  *Metal Implant; Artificial Joints/Limbs; Back; Breast; breast reduction; Colonoscopy; Cystoscopy; Hernia; Hernia Repair; Hysterectomy; Hysterectomy-Abdominal; Joint Surgery; Lumbar laminectomy; Metal implants; Minimally invasive Discectomy; Sinus Surgery; Tubal ligation; Wrist Surgery         Medication List  Claritin oral; glimepiride 2 mg oral tablet; losartan 100 mg oral tablet; metformin 500 mg oral tablet extended release 24 hr; Norco 5-325 mg oral tablet; omeprazole 40 mg oral capsule,delayed release(DR/EC); pravastatin 20 mg oral tablet; Prilosec oral; propranolol 80 mg oral capsule,extended release 24 hr; Prozac 20 mg oral capsule; Vitamin D2 50,000 unit oral capsule; Zofran 4  "mg oral tablet         Allergy List  Augmentin; Mobic       Allergies Reconciled  Family Medical History  Stroke; Heart Disease; Cancer, Unspecified; Diabetes, unspecified type; Prostate Cancer; Renal Calculus; Bladder calculus; Family history of certain chronic disabling diseases; arthritis; Family history of Arthritis         Reproductive History   0 Para 0 0 0 0 & Postmenopausal       Social History  Alcohol Use; Homemaker.; lives with spouse; .; Recreational Drug Use (Never); Retired.; Tobacco (Former); Unemployed.         Review of Systems  · Cardiovascular  o Denies  o : chest pain on exertion, shortness of breath, lower extremity swelling  · Respiratory  o Denies  o : wheezing, chronic cough, coughing up blood  · Gastrointestinal  o Admits  o : diarrhea, nausea      Vitals  Date Time BP Position Site L\R Cuff Size HR RR TEMP (F) WT  HT  BMI kg/m2 BSA m2 O2 Sat FR L/min FiO2 HC       2020 02:58 PM       16  172lbs 0oz 5'  3\" 30.47 1.86             Physical Examination     Her incisions are healing. Her sutures were removed. She complains of diarrhea and nausea.           Assessment  · Encounter for examination following surgery     V67.00/Z09      Plan     She was given Zofran 4 mg one PO Q. 6h PRN #20. She was also told to use Kaopectate as needed. She will follow-up if her diarrhea persists.             Electronically Signed by: Rola Fang-, -Author on 2020 02:22:35 PM  Electronically Co-signed by: Tereso Shaffer MD -Reviewer on 2020 07:42:00 AM  "

## 2021-05-13 NOTE — PROGRESS NOTES
Progress Note      Patient Name: Jada Butler   Patient ID: [id number]   Sex: Female   YOB: 1955    Primary Care Provider: Ellis Mendoza MD   Referring Provider: ELLIS MENDOZA MD    Visit Date: November 19, 2020    Provider: Carlyle Cueva MD   [place] [facility]   Location Address: [address] [place] [address]  [id number]   Location Phone: [phone]          Chief Complaint     Patient would like to discuss MRI results.       History Of Present Illness  TELEHEALTH TELEPHONE VISIT  Jada Butler is a 64 year old /White female who is presenting for evaluation via telehealth telephone visit. Verbal consent obtained before beginning visit.   Provider spent 10 minutes minutes with the patient during the telehealth visit.   The following staff were present during this visit: Marina Frazier MA   Past Medical History/ Overview of Patient Symptoms     Her worst pain at the present time is in thoracic spine. She had a lumbar MRI with no recurrent disc at L5-S1 and moderate stenosis at L4-5. She has no leg pain at the present time.       Past Medical History  Allergic rhinitis; Allergic rhinitis, chronic; Anemia, Unspecified; Arthritis; Arthritis; Bladder Disorder; Degeneration of lumbar intervertebral disc; Diabetes; Diabetes Mellitus, Type II; Displacement of lumbar intervertebral disc; GERD; High blood pressure; High cholesterol; History of arthroscopy of left knee; Hyperlipemia; Hyperlipidemia; Hypertension; Impaired fasting glucose; Insomnia; Lateral meniscus tear, current, left; Left Knee Osteoarthritis ; Low back pain; Lower abdominal pain; Lumbago/low back pain; Lumbar Spinal Stenosis; Microscopic hematuria; Mood swings; Radiculopathy, lumbosacral; Reflux; Seasonal allergies; Ulcer         Past Surgical History  *Metal Implant; Artificial Joints/Limbs; Back; Breast; breast reduction; Colonoscopy; Cystoscopy; Hernia; Hernia Repair; Hysterectomy;  Hysterectomy-Abdominal; Joint Surgery; Lumbar laminectomy; Metal implants; Minimally invasive Discectomy; Sinus Surgery; Tubal ligation; Wrist Surgery         Medication List  amlodipine 5 mg oral tablet; Claritin oral; glimepiride 2 mg oral tablet; losartan 100 mg oral tablet; metformin 500 mg oral tablet extended release 24 hr; pravastatin 20 mg oral tablet; propranolol 80 mg oral capsule,extended release 24 hr; Prozac 20 mg oral capsule; Vitamin D2 50,000 unit oral capsule         Allergy List  Augmentin; Mobic       Allergies Reconciled  Family Medical History  Stroke; Heart Disease; Cancer, Unspecified; Diabetes, unspecified type; Prostate Cancer; Renal Calculus; Bladder calculus; Family history of certain chronic disabling diseases; arthritis; Family history of Arthritis         Reproductive History   0 Para 0 0 0 0 & Postmenopausal       Social History  Alcohol Use; Homemaker.; lives with spouse; .; Recreational Drug Use (Never); Retired.; Tobacco (Former); Unemployed.         Review of Systems  · Constitutional  o Denies  o : chills, excessive sweating, fatigue, fever, sycope/passing out, weight gain, weight loss  · Eyes  o Denies  o : changes in vision, blurry vision, double vision  · HENT  o Denies  o : loss of hearing, ringing in the ears, ear aches, sore throat, nasal congestion, sinus pain, nose bleeds, seasonal allergies  · Cardiovascular  o Denies  o : blood clots, swollen legs, anemia, easy burising or bleeding, transfusions  · Respiratory  o Denies  o : shortness of breath, dry cough, productive cough, pneumonia, COPD  · Gastrointestinal  o Denies  o : difficulty swallowing, reflux  · Genitourinary  o Denies  o : incontinence  · Neurologic  o Denies  o : headache, seizure, stroke, tremor, loss of balance, falls, dizziness/vertigo, difficulty with sleep, numbness/tingling/paresthesia , difficulty with coordination, difficulty with dexterity, weakness  · Musculoskeletal  o Admits  o :  pain radiating in leg, low back pain  o Denies  o : neck stiffness/pain, swollen lymph nodes, muscle aches, joint pain, weakness, spasms, sciatica, pain radiating in arm  · Endocrine  o Denies  o : diabetes, thyroid disorder  · Psychiatric  o Denies  o : anxiety, depression      Physical Examination     Telephone visit           Assessment  · Degeneration of lumbar intervertebral disc     722.52/M51.36  · Lumbago/low back pain     724.3/M54.40  · Lumbar Spinal Stenosis     724.02/M48.06  · Lumbosacral disc herniation, L5-S1     722.10/M51.27  · Post-operative pain     338.18/G89.18  · Thoracic spine pain     724.1/M54.6      Plan  · Orders  o Physican Telephone evaluation, 5-10 min (30594) - - 11/19/2020  · Medications  o Medications have been Reconciled  o Transition of Care or Provider Policy  · Instructions  o Plan Of Care:   o She will call back if the thoracic pain is not improving and we will arrange for a thoracic MRI.             Electronically Signed by: Carlyle Cueva MD -Author on November 19, 2020 03:49:00 PM

## 2021-05-13 NOTE — PROGRESS NOTES
Progress Note      Patient Name: Jada Butler   Patient ID: [id number]   Sex: Female   YOB: 1955    Primary Care Provider: Ellis Mendoza MD   Referring Provider: Ellis Mendoza MD    Visit Date: July 28, 2020    Provider: Samira Barnett PA-C   Location: [facility]   Location Address: [address] [place] [address]  [id number]   Location Phone: [phone]          Chief Complaint     Patient is being seen today for low back pain.       History Of Present Illness     She continues to have lbp and bilateral leg pain but right is worse than the left and now just to the posterior thigh down to the knee.  Denies fever.  She has constant pain and worse with standing and walking. Medrol dose pack did not help her pain.       Past Medical History  Allergic rhinitis; Anemia, Unspecified; Arthritis; Degeneration of lumbar intervertebral disc; Diabetes; Diabetes Mellitus, Type II; Displacement of lumbar intervertebral disc; GERD; History of arthroscopy of left knee; Hyperlipemia; Hyperlipidemia; Hypertension; Impaired fasting glucose; Insomnia; Lateral meniscus tear, current, left; Left Knee Osteoarthritis ; Low back pain; Lower abdominal pain; Lumbago/low back pain; Lumbar Spinal Stenosis; Microscopic hematuria; Mood swings; Radiculopathy, lumbosacral; Reflux; Seasonal allergies; Ulcer         Past Surgical History  Artificial Joints/Limbs; breast reduction; Colonoscopy; Cystoscopy; Hernia; Hernia Repair; Hysterectomy; Joint Surgery; Lumbar laminectomy; Metal implants; Minimally invasive Discectomy; Sinus Surgery; Tubal ligation; Wrist Surgery         Medication List  Claritin oral; cyclobenzaprine 10 mg oral tablet; dicyclomine 10 mg oral capsule; dicyclomine 20 mg oral tablet; fluoxetine 20 mg oral capsule; glimepiride 2 mg oral tablet; losartan 100 mg oral tablet; metformin 500 mg oral tablet extended release 24 hr; Norco 5-325 mg oral tablet; omeprazole 40 mg oral capsule,delayed  release(DR/EC); pravastatin 20 mg oral tablet; prednisone 20 mg oral tablet; Prilosec oral; propranolol 80 mg oral capsule,extended release 24 hr; Prozac 20 mg oral capsule; Vitamin D2 50,000 unit oral capsule         Allergy List  Augmentin; Mobic         Family Medical History  Stroke; Heart Disease; Cancer, Unspecified; Diabetes, unspecified type; Family history of certain chronic disabling diseases; arthritis; Family history of Arthritis         Reproductive History   0 Para 0 0 0 0 & Postmenopausal       Social History  Alcohol Use (Current some day); Homemaker.; lives with spouse; .; Recreational Drug Use (Never); Retired.; Tobacco (Former); Unemployed.         Review of Systems  · Constitutional  o Denies  o : chills, excessive sweating, fatigue, fever, sycope/passing out, weight gain, weight loss  · Eyes  o Denies  o : changes in vision, blurry vision, double vision  · HENT  o Admits  o : ear aches  o Denies  o : loss of hearing, ringing in the ears, sore throat, nasal congestion, sinus pain, nose bleeds, seasonal allergies  · Cardiovascular  o Denies  o : blood clots, swollen legs, anemia, easy burising or bleeding, transfusions  · Respiratory  o Denies  o : shortness of breath, dry cough, productive cough, pneumonia, COPD  · Gastrointestinal  o Denies  o : difficulty swallowing, reflux  · Genitourinary  o Denies  o : incontinence  · Neurologic  o Admits  o : falls  o Denies  o : headache, seizure, stroke, tremor, loss of balance, dizziness/vertigo, difficulty with sleep, numbness/tingling/paresthesia , difficulty with coordination, difficulty with dexterity, weakness  · Musculoskeletal  o Admits  o : muscle aches, pain radiating in leg, low back pain  o Denies  o : neck stiffness/pain, swollen lymph nodes, joint pain, weakness, spasms, sciatica, pain radiating in arm  · Endocrine  o Admits  o : diabetes  o Denies  o : thyroid disorder  · Psychiatric  o Denies  o : anxiety, depression  · All  "Others Negative      Vitals  Date Time BP Position Site L\R Cuff Size HR RR TEMP (F) WT  HT  BMI kg/m2 BSA m2 O2 Sat HC       07/28/2020 04:08 PM        97.9 172lbs 8oz 5'  3\" 30.56 1.86           Physical Examination     Gait a little slow.  pain with right SLR           Assessment  · Degeneration of lumbar intervertebral disc     722.52/M51.36  · Lumbago/low back pain     724.3/M54.40  · Lumbar Spinal Stenosis     724.02/M48.06  · Lumbosacral disc herniation, L5-S1     722.10/M51.27  · Post-operative pain     338.18/G89.18    Problems Reconciled  Plan  · Orders  o MRI lumbar spine w/w/o contrst (30262) - 722.10/M51.27, 724.3/M54.40, 724.02/M48.06, 338.18/G89.18 - 07/28/2020  · Medications  o Medications have been Reconciled  o Transition of Care or Provider Policy  · Instructions  o I will order a post-operative MRI and f/u with Dr. Cueva to discuss results.   · Associate Tasks  o Task ID [id number] Rx Request: pain med. refill s/p lumbar surgery 6 weeks ago with ongoing pain and I'm ordering MRI            Electronically Signed by: Samira Barnett PA-C -Author on August 11, 2020 08:34:28 AM  "

## 2021-05-14 VITALS — BODY MASS INDEX: 31.56 KG/M2 | RESPIRATION RATE: 16 BRPM | WEIGHT: 178.12 LBS | HEIGHT: 63 IN

## 2021-05-14 VITALS — BODY MASS INDEX: 31.71 KG/M2 | HEIGHT: 63 IN | RESPIRATION RATE: 14 BRPM | WEIGHT: 179 LBS

## 2021-05-14 VITALS — RESPIRATION RATE: 16 BRPM | BODY MASS INDEX: 30.48 KG/M2 | HEIGHT: 63 IN | WEIGHT: 172 LBS

## 2021-05-14 VITALS — HEIGHT: 63 IN | RESPIRATION RATE: 14 BRPM | WEIGHT: 172 LBS | BODY MASS INDEX: 30.48 KG/M2

## 2021-05-14 VITALS — BODY MASS INDEX: 31.94 KG/M2 | WEIGHT: 180.25 LBS | HEIGHT: 63 IN | RESPIRATION RATE: 16 BRPM

## 2021-05-14 VITALS — BODY MASS INDEX: 31.23 KG/M2 | RESPIRATION RATE: 14 BRPM | HEIGHT: 63 IN | WEIGHT: 176.25 LBS

## 2021-05-14 VITALS — HEIGHT: 63 IN | WEIGHT: 179 LBS | RESPIRATION RATE: 14 BRPM | BODY MASS INDEX: 31.71 KG/M2

## 2021-05-14 NOTE — PROGRESS NOTES
Progress Note      Patient Name: Jada Butler   Patient ID: [id number]   Sex: Female   YOB: 1955    Primary Care Provider: Ellis Mendoza MD   Referring Provider: Rain MONET    Visit Date: March 25, 2021    Provider: LURDES Tripathi   Location: [facility]   Location Address: [address] [place] [address]  [id number]   Location Phone: [phone]          Chief Complaint  · Follow Up Colonoscopy      History Of Present Illness  Jada Butler is a 65 year old /White female who is here to follow up colonoscopy.      Patient presents today for follow-up visit after undergoing a colonoscopy on 3/18/2021 performed by Dr. Joon Norris.  Patient was with a normal colon per colonoscopy/pathology.  Patient denies any postoperative complications.       Past Medical History  Disease Name Date Onset Notes   Abdominal pain, left lower quadrant 02/04/2021 --    Allergic rhinitis --  --    Allergic rhinitis, chronic --  --    Anemia, Unspecified --  --    Arthritis --  --    Arthritis --  --    Bladder disorder --  --    Degeneration of lumbar intervertebral disc 08/17/2015 --    Diabetes --  --    Diabetes Mellitus, Type II 03/14/2014 --    Displacement of lumbar intervertebral disc 08/17/2015 --    GERD --  --    High blood pressure --  --    High cholesterol --  --    History of arthroscopy of left knee 09/01/2016 --    Hyperlipemia --  --    Hyperlipidemia 03/04/2014 --    Hypertension --  --    Impaired fasting glucose 03/04/2014 --    Insomnia --  --    Kidney stones --  --    Lateral meniscus tear, current, left 05/10/2016 --    Left Knee Osteoarthritis  09/14/2016 --    Low back pain 01/25/2018 --    Lower abdominal pain 01/25/2018 --    Lumbago/low back pain --  --    Lumbar Spinal Stenosis 08/17/2015 moderate to severe at L4/5   Lumbar Spinal Stenosis 11/19/2020 --    Lumbosacral disc herniation, L5-S1 11/19/2020 --    Microscopic hematuria 01/25/2018 --     Mood swings --  --    Post-operative pain 11/19/2020 --    Radiculopathy, lumbosacral 08/17/2015 left   Reflux --  --    Seasonal allergies --  --    Ulcer --  --          Past Surgical History  Procedure Name Date Notes   *Metal Implant --  --    Artificial Joints/Limbs --  --    Back --  --    Breast --  --    breast reduction --  --    Colonoscopy --  --    Cystoscopy 3-8-18 --    Gallbladder --  --    Hernia --  --    Hernia Repair 1998 --    Hysterectomy 1996 --    Hysterectomy-Abdominal --  --    Joint Surgery --  --    Lumbar laminectomy 2/19/2016 L4-5   Metal implants --  --    Minimally invasive Discectomy 6-26-20 Right L5-S1   Sinus Surgery --  --    Tubal ligation --  --    Wrist Surgery 11/6/14 fracture repair         Medication List  Name Date Started Instructions   amlodipine 5 mg oral tablet  take 1 tablet (5 mg) by oral route once daily   Claritin oral  --    famotidine 20 mg oral tablet  take 1 tablet (20 mg) by oral route once daily at bedtime   glimepiride 2 mg oral tablet  take 1 tablet (2 mg) by oral route once daily   losartan 100 mg oral tablet  take 1 tablet (100 mg) by oral route once daily   metformin 500 mg oral tablet extended release 24 hr 01/05/2015 take 1 tablet by oral route 2 times a day   pravastatin 20 mg oral tablet 01/05/2015 take 1 tablet (20 mg) by oral route once daily at bedtime   propranolol 80 mg oral capsule,extended release 24 hr  take 1 capsule (80 mg) by oral route once daily   Prozac 20 mg oral capsule  take 2 capsules (40 mg) by oral route once daily in the evening   Questran 4 gram oral powder in packet 01/26/2021 take 1 packet (4 gram) dissolved in 2 to 6 ounces of water or noncarbonated beverage by oral route 1 time per day   Suprep Bowel Prep Kit 17.5-3.13-1.6 gram oral recon soln 03/10/2021 take as directed   Vitamin D2 50,000 unit oral capsule  take 1 capsule (50,000 unit) by oral route once weekly         Allergy List  Allergen Name Date Reaction Notes    Augmentin --  vomiting reports she has vomiting.   Mobic --  --  --        Allergies Reconciled  Family Medical History  Disease Name Relative/Age Notes   Stroke Father/  Mother/   Mother; Father   Heart Disease Brother/  Father/  Mother/   Mother; Father; Brother  Father; Mother   Cancer, Unspecified Mother/   Mother  Mother; Brother   Diabetes, unspecified type Father/   Father; Uncle (maternal)  Father   Prostate cancer Brother/   Brother   Renal Calculus Mother/   Mother   Bladder calculus Brother/  Mother/   Mother; Brother   Family history of certain chronic disabling diseases; arthritis Mother/   Mother   Family history of Arthritis Mother/   Mother         Reproductive History  Menstrual   Menopause Status: Postmenopausal HRT?: No   Pregnancy Summary   Total Pregnancies: 0 Full Term: 0 Premature: 0   Ab Induced: 0 Ab Spontaneous: 0 Ectopics: 0   Multiples: 0 Livin         Social History  Finding Status Start/Stop Quantity Notes   Alcohol Use --  --/-- --  2020 - rarely drinks   Homemaker. --  --/-- --  --    lives with spouse --  --/-- --  --    . --  --/-- --  --    Recreational Drug Use Never --/-- --  no   Retired. --  --/-- --  --    Tobacco Former --/-- --  2020 - former smoker  former smoker  former smoker  Quit    Unemployed. --  --/-- --  --          Review of Systems  · Constitutional  o Denies  o : chills, fever  · Eyes  o Denies  o : yellowish discoloration of eyes  · HENT  o Denies  o : difficulty swallowing  · Cardiovascular  o Denies  o : chest pain on exertion  · Respiratory  o Denies  o : shortness of breath  · Gastrointestinal  o Denies  o : nausea, vomiting, diarrhea, constipation  · Genitourinary  o Denies  o : abnormal color of urine  · Integument  o Denies  o : rash  · Neurologic  o Denies  o : tingling or numbness  · Musculoskeletal  o Denies  o : joint pain  · Endocrine  o Denies  o : weight gain, weight loss      Vitals  Date Time BP Position Site  "L\R Cuff Size HR RR TEMP (F) WT  HT  BMI kg/m2 BSA m2 O2 Sat FR L/min FiO2 HC       03/25/2021 03:33 PM       16  178lbs 2oz 5'  3\" 31.55 1.9             Physical Examination  · Constitutional  o Appearance  o : well developed, well-nourished, patient in no apparent distress  · Head and Face  o Head  o :   § Inspection  § : atraumatic, normocephalic  o Face  o :   § Inspection  § : no facial lesions  · Eyes  o Conjunctivae  o : conjunctivae normal  o Sclerae  o : sclerae white  · Neck  o Inspection/Palpation  o : normal appearance, no masses or tenderness, trachea midline  · Respiratory  o Respiratory Effort  o : breathing unlabored  · Skin and Subcutaneous Tissue  o General Inspection  o : no lesions present, no areas of discoloration, skin turgor normal, texture normal  · Neurologic  o Mental Status Examination  o :   § Orientation  § : grossly oriented to person, place and time  § Attention  § : attention normal, concentration abilities normal  § Fund of Knowledge  § : fund of knowledge within normal limits, patient aware of current events  o Gait and Station  o : normal gait, able to stand without difficulty  · Psychiatric  o Judgement and Insight  o : judgment and insight intact  o Mood and Affect  o : mood normal, affect appropriate          Assessment  · S/P colonoscopy     V45.89/Z98.890    Problems Reconciled  Plan  · Medications  o Medications have been Reconciled  o Transition of Care or Provider Policy  · Instructions  o Per the AGA guidelines of 2012 patient is to follow up for colonoscopy surveillance  o Rescreen: In 5 years. Follow-up in the interim as needed.  o Electronically Identified Patient Education Materials Provided Electronically            Electronically Signed by: LURDES Tripathi -Author on March 25, 2021 07:45:41 PM  "

## 2021-05-14 NOTE — PROGRESS NOTES
Progress Note      Patient Name: Jada Butler   Patient ID: [id number]   Sex: Female   YOB: 1955    Primary Care Provider: Coleen Mendoza MD   Referring Provider: COLEEN MENDOZA MD    Visit Date: February 4, 2021    Provider: LURDES Griffith   Location: [facility]   Location Address: [address] [place] [address]  [id number]   Location Phone: [phone]          Chief Complaint  · PT HERE FOR UROLOGICAL CONCERNS      History Of Present Illness     65 year old  female patient.     The patient underwent a laparoscopic cholecystectomy in October 2020 with Dr. Tereso Shaffer.    The patient presented for a postop follow-up with complaints of flank pain and dysuria.    The patient was referred to urology as the general surgeon believed her pain to be urologic in nature.  A urinalysis was performed which revealed small amount of occult blood however no red blood cells per high-powered field.    Patient presented to the emergency department on 1/3/2021 with complaints of nausea with vomiting and flank pain.  A CT scan of the abdomen and pelvis with contrast was performed which revealed no acute findings in the abdomen or pelvis by enhanced CT examination.    She has had sporadic llq non specific abd pain since 2019.           Past Medical History  Allergic rhinitis; Allergic rhinitis, chronic; Anemia, Unspecified; Arthritis; Arthritis; Bladder Disorder; Degeneration of lumbar intervertebral disc; Diabetes; Diabetes Mellitus, Type II; Displacement of lumbar intervertebral disc; GERD; High blood pressure; High cholesterol; History of arthroscopy of left knee; Hyperlipemia; Hyperlipidemia; Hypertension; Impaired fasting glucose; Insomnia; Kidney stones; Lateral meniscus tear, current, left; Left Knee Osteoarthritis ; Low back pain; Lower abdominal pain; Lumbago/low back pain; Lumbar Spinal Stenosis; Lumbar Spinal Stenosis; Lumbosacral disc herniation, L5-S1; Microscopic  "hematuria; Mood swings; Post-operative pain; Radiculopathy, lumbosacral; Reflux; Seasonal allergies; Ulcer         Past Surgical History  *Metal Implant; Artificial Joints/Limbs; Back; Breast; breast reduction; Colonoscopy; Cystoscopy; Gallbladder; Hernia; Hernia Repair; Hysterectomy; Hysterectomy-Abdominal; Joint Surgery; Lumbar laminectomy; Metal implants; Minimally invasive Discectomy; Sinus Surgery; Tubal ligation; Wrist Surgery         Medication List  amlodipine 5 mg oral tablet; Claritin oral; famotidine 20 mg oral tablet; glimepiride 2 mg oral tablet; losartan 100 mg oral tablet; metformin 500 mg oral tablet extended release 24 hr; pravastatin 20 mg oral tablet; propranolol 80 mg oral capsule,extended release 24 hr; Prozac 20 mg oral capsule; Questran 4 gram oral powder in packet; Vitamin D2 50,000 unit oral capsule         Allergy List  Augmentin; Mobic       Allergies Reconciled  Family Medical History  Stroke; Heart Disease; Cancer, Unspecified; Diabetes, unspecified type; Prostate cancer; Renal Calculus; Bladder calculus; Family history of certain chronic disabling diseases; arthritis; Family history of Arthritis         Reproductive History   0 Para 0 0 0 0 & Postmenopausal       Social History  Alcohol Use; Homemaker.; lives with spouse; .; Recreational Drug Use (Never); Retired.; Tobacco (Former); Unemployed.         Review of Systems  · Constitutional  o Denies  o : chills, fever  · Gastrointestinal  o Denies  o : nausea, vomiting, flank pain  · Genitourinary  o Denies  o : abnormal color of urine, blood in urine, burning with urination      Vitals  Date Time BP Position Site L\R Cuff Size HR RR TEMP (F) WT  HT  BMI kg/m2 BSA m2 O2 Sat FR L/min FiO2 HC       2021 01:33 PM       16  180lbs 4oz 5'  3\" 31.93 1.91             Physical Examination  · Constitutional  o Appearance  o : well-nourished, well developed, alert, in no acute distress  · Head and Face  o Head  o : "   § Inspection  § : atraumatic, normocephalic  o Face  o :   § Inspection  § : no facial lesions  · Eyes  o Sclerae  o : sclerae white  · Ears, Nose, Mouth and Throat  o Ears  o :   § External Ears  § : appearance within normal limits, no lesions present  o Nose  o :   § External Nose  § : appearance normal  · Neck  o Inspection/Palpation  o : normal appearance, trachea midline  · Respiratory  o Respiratory Effort  o : breathing unlabored  o Inspection of Chest  o : normal appearance, no retractions  · Skin and Subcutaneous Tissue  o General Inspection  o : no rashes or lesions present, no lesions present, no areas of discoloration  · Neurologic  o Mental Status Examination  o :   § Orientation  § : grossly oriented to person, place and time  § Speech/Language  § : communication ability within normal limits  o Gait and Station  o : normal gait, able to stand without difficulty  · Psychiatric  o Judgement and Insight  o : judgment and insight intact, judgement for everyday activities and social situations within normal limits, insight intact  o Mood and Affect  o : mood normal, affect appropriate          Results  · In-Office Procedures  o Lab procedure  § Automated dipstick urinalysis with microscopy (28896)   § Color Ur: Yellow   § Clarity Ur: Clear   § Glucose Ur Ql Strip: Negative   § Bilirub Ur Ql Strip: Negative   § Ketones Ur Ql Strip: Negative   § Sp Gr Ur Qn: >=1.030   § Hgb Ur Ql Strip: Small   § pH Ur-LsCnc: 5.0   § Prot Ur Ql Strip: Negative   § Urobilinogen Ur Strip-mCnc: 0.2   § Nitrite Ur Ql Strip: Negative   § WBC Est Ur Ql Strip: Negative   § RBC UrnS Qn HPF: 0   § WBC UrnS Qn HPF: 0   § Bacteria UrnS Qn HPF: 0   § Crystals UrnS Qn HPF: 0   § Epithelial Cells (non renal): 0 /HPF  § Epithelial Cells (renal): 0       Assessment  · Abdominal pain, left lower quadrant     789.04/R10.32      Plan  · Medications  o Medications have been Reconciled  o Transition of Care or Provider  Policy  · Instructions  o discussed with patient that given her negative diagnostic work-up as well as the absence of any red blood cells on urine microscopy I do not believe this to be urologic in nature. Given the chronicity of her left lower quadrant abdominal pain I do believe a consult to GI is necessary at this point in time.  o Electronically Identified Patient Education Materials Provided Electronically            Electronically Signed by: LURDES Griffith -Author on February 4, 2021 02:34:28 PM

## 2021-05-15 VITALS — WEIGHT: 172.5 LBS | HEIGHT: 63 IN | TEMPERATURE: 97.9 F | BODY MASS INDEX: 30.56 KG/M2

## 2021-05-15 VITALS
WEIGHT: 175.12 LBS | TEMPERATURE: 97.5 F | HEART RATE: 73 BPM | TEMPERATURE: 97.5 F | SYSTOLIC BLOOD PRESSURE: 113 MMHG | DIASTOLIC BLOOD PRESSURE: 66 MMHG

## 2021-05-15 VITALS — HEIGHT: 63 IN | HEART RATE: 80 BPM | BODY MASS INDEX: 31.27 KG/M2 | OXYGEN SATURATION: 98 % | WEIGHT: 176.5 LBS

## 2021-05-15 VITALS — BODY MASS INDEX: 30.65 KG/M2 | TEMPERATURE: 97.6 F | HEIGHT: 63 IN | WEIGHT: 173 LBS

## 2021-05-15 VITALS
HEIGHT: 63 IN | DIASTOLIC BLOOD PRESSURE: 82 MMHG | SYSTOLIC BLOOD PRESSURE: 132 MMHG | WEIGHT: 177.37 LBS | BODY MASS INDEX: 31.43 KG/M2

## 2021-05-16 VITALS — BODY MASS INDEX: 30.48 KG/M2 | HEIGHT: 63 IN | RESPIRATION RATE: 16 BRPM | WEIGHT: 172 LBS

## 2021-05-26 ENCOUNTER — OFFICE VISIT CONVERTED (OUTPATIENT)
Dept: ORTHOPEDIC SURGERY | Facility: CLINIC | Age: 66
End: 2021-05-26
Attending: ORTHOPAEDIC SURGERY

## 2021-05-26 ENCOUNTER — CONVERSION ENCOUNTER (OUTPATIENT)
Dept: ORTHOPEDIC SURGERY | Facility: CLINIC | Age: 66
End: 2021-05-26

## 2021-06-05 NOTE — H&P
History and Physical      Patient Name: Jada Butler   Patient ID: [id number]   Sex: Female   YOB: 1955    Primary Care Provider: Ellis Mendoza MD   Referring Provider: Rain MONET    Visit Date: May 26, 2021    Provider: Stevo Zeng MD   Location: [facility]   Location Address: [address] [place] [address]  [id number]   Location Phone: [phone]          Chief Complaint  · Left Shoulder/Arm Pain      History Of Present Illness  Jada Butler is a 65 year old /White female who presents today to [facility].      Patient presents today an evaluation of left shoulder/arm. We last saw patient in 2019 for left scapular trigger point. She localizes her pain posteriorly, around the scapula. She states pain comes and goes. Patient does have pain with shoulder range of motion. She states she also has elbow pain. She states she has to sleep with a straight elbow.       Past Medical History  Abdominal pain, left lower quadrant; Allergic rhinitis; Allergic rhinitis, chronic; Anemia, Unspecified; Arthritis; Arthritis; Bladder disorder; Degeneration of lumbar intervertebral disc; Diabetes; Diabetes Mellitus, Type II; Displacement of lumbar intervertebral disc; GERD; High blood pressure; High cholesterol; History of arthroscopy of left knee; Hyperlipemia; Hyperlipidemia; Hypertension; Impaired fasting glucose; Insomnia; Kidney Stones; Lateral meniscus tear, current, left; Left Knee Osteoarthritis ; Low back pain; Lower abdominal pain; Lumbago/low back pain; Lumbar Spinal Stenosis; Lumbar Spinal Stenosis; Lumbosacral disc herniation, L5-S1; Microscopic hematuria; Mood swings; Post-operative pain; Radiculopathy, lumbosacral; Reflux; Seasonal allergies; Ulcer         Past Surgical History  *Metal Implant; Artificial Joints/Limbs; Back; Breast; breast reduction; Colonoscopy; Cystoscopy; Gallbladder; Hernia; Hernia Repair; Hysterectomy; Hysterectomy-Abdominal; Joint  "Surgery; Lumbar laminectomy; Metal implants; Minimally invasive Discectomy; Sinus Surgery; Tubal ligation; Wrist Surgery         Medication List  amlodipine 5 mg oral tablet; Celebrex 50 mg oral capsule; Claritin oral; famotidine 20 mg oral tablet; glimepiride 2 mg oral tablet; losartan 100 mg oral tablet; metformin 500 mg oral tablet extended release 24 hr; pravastatin 20 mg oral tablet; propranolol 80 mg oral capsule,extended release 24 hr; Prozac 20 mg oral capsule; Questran 4 gram oral powder in packet; Ultram 50 mg oral tablet; Vitamin D2 50,000 unit oral capsule         Allergy List  Augmentin; Mobic       Allergies Reconciled  Family Medical History  Stroke; Heart Disease; Cancer, Unspecified; Diabetes, unspecified type; Prostate cancer; Renal Calculus; Bladder calculus; Family history of certain chronic disabling diseases; arthritis; Family history of Arthritis         Reproductive History   0 Para 0 0 0 0 & Postmenopausal       Social History  Alcohol Use; Homemaker.; lives with spouse; .; Recreational Drug Use (Never); Retired.; Tobacco (Former); Unemployed.         Review of Systems  · Constitutional  o Denies  o : fever, chills, weight loss  · Cardiovascular  o Denies  o : chest pain, shortness of breath  · Gastrointestinal  o Denies  o : liver disease, heartburn, nausea, blood in stools  · Genitourinary  o Denies  o : painful urination, blood in urine  · Integument  o Denies  o : rash, itching  · Neurologic  o Denies  o : headache, weakness, loss of consciousness  · Musculoskeletal  o Denies  o : painful, swollen joints  · Psychiatric  o Denies  o : drug/alcohol addiction, anxiety, depression      Vitals  Date Time BP Position Site L\R Cuff Size HR RR TEMP (F) WT  HT  BMI kg/m2 BSA m2 O2 Sat FR L/min FiO2        2021 03:11 PM      105 - R   175lbs 8oz 5'  3\" 31.09 1.88 98 %            Physical Examination  · Constitutional  o Appearance  o : well developed, well-nourished, no " obvious deformities present  · Head and Face  o Head  o :   § Inspection  § : normocephalic  o Face  o :   § Inspection  § : no facial lesions  · Eyes  o Conjunctivae  o : conjunctivae normal  o Sclerae  o : sclerae white  · Ears, Nose, Mouth and Throat  o Ears  o :   § External Ears  § : appearance within normal limits  § Hearing  § : intact  o Nose  o :   § External Nose  § : appearance normal  · Neck  o Inspection/Palpation  o : normal appearance  o Range of Motion  o : full range of motion  · Respiratory  o Respiratory Effort  o : breathing unlabored  o Inspection of Chest  o : normal appearance  o Auscultation of Lungs  o : no audible wheezing or rales  · Cardiovascular  o Heart  o : regular rate  · Gastrointestinal  o Abdominal Examination  o : soft and non-tender  · Skin and Subcutaneous Tissue  o General Inspection  o : intact, no rashes  · Psychiatric  o General  o : Alert and oriented x3  o Judgement and Insight  o : judgment and insight intact  o Mood and Affect  o : mood normal, affect appropriate  · Left Shoulder  o Inspection  o : Sensation grossly intact. Neurovascular intact. Skin intact. Positive pulses. Good tone of deltoid, biceps, triceps, wrist extensors, and wrist flexors. Tender scapula. Pain with full forward flexion. Tender lateral epicondyle. No swelling, skin discoloration or atrophy. No winging of scapula, no scapular dyskinesis, full cervical ROM. Painful cross body adduction. Full elbow range of motion. Painful pronation and supination.   · Injection Note/Aspiration Note  o Site  o : left shoulder   o Procedure  o : Procedure: After educating the patient, patient gave consent for procedure. After using Chloraprep, the joint space was injected. The patient tolerated the procedure well.   o Medication  o : 80 mg of DepoMedrol with 9cc of 1% Lidocaine  · In Office Procedures  o View  o : AP/LATERAL  o Site  o : left, shoulder   o Indication  o : Left shoulder pain   o Study  o : X-rays  ordered, taken in the office, and reviewed today.  o Xray  o : No evidence for fracture or dislocation.               Assessment  · Bursitis of left shoulder     726.10/M75.52  · Pain of left upper extremity     729.5/M79.602  · Left shoulder pain, unspecified chronicity     719.41/M25.512  · Lateral epicondylitis of left elbow     726.32/M77.12      Plan  · Orders  o Depo-Medrol injection 80mg () - - 05/26/2021   Lot [id number] Exp 11 2022 [employer] Administered by JUANA Zeng MD  o Shoulder Intra-articular Injection without US Guidance [facility] ([id number]) - - 05/26/2021   Lot [id number] Exp 12 2022 [employer] Administered by JUANA Zeng MD  o Scapula (Left) [facility] ([id number]) - 729.5/M79.602, 719.41/M25.512 - 05/26/2021  · Medications  o Medications have been Reconciled  o Transition of Care or Provider Policy  · Instructions  o Dr. Zeng saw and examined the patient and agrees with plan.   o X-rays reviewed by Dr. Zeng.  o Reviewed the patient's Past Medical, Social, and Family history as well as the ROS at today's visit, no changes.  o Call or return if worsening symptoms.  o Exercise handout given.  o Follow Up PRN.  o Discussed diagnosis and treatment plans with the patient. Patient received a left shoulder injection and tolerated this procedure well. She was given home exercises for tennis elbow. She was provided with a prescription for PT.  o The above service was scribed by Jennifer Last on my behalf and I attest to the accuracy of the note. tessa            Electronically Signed by: Jennifer Last-, Other -Author on May 27, 2021 08:39:09 AM  Electronically Co-signed by: Stevo Zeng MD -Reviewer on May 27, 2021 11:03:05 PM

## 2021-06-09 ENCOUNTER — TREATMENT (OUTPATIENT)
Dept: PHYSICAL THERAPY | Facility: CLINIC | Age: 66
End: 2021-06-09

## 2021-06-09 DIAGNOSIS — G89.29 CHRONIC LEFT SHOULDER PAIN: Primary | ICD-10-CM

## 2021-06-09 DIAGNOSIS — M25.522 LEFT ELBOW PAIN: ICD-10-CM

## 2021-06-09 DIAGNOSIS — M25.532 LEFT WRIST PAIN: ICD-10-CM

## 2021-06-09 DIAGNOSIS — M25.512 CHRONIC LEFT SHOULDER PAIN: Primary | ICD-10-CM

## 2021-06-09 PROCEDURE — 97162 PT EVAL MOD COMPLEX 30 MIN: CPT | Performed by: PHYSICAL THERAPIST

## 2021-06-09 NOTE — PROGRESS NOTES
"Physical Therapy Initial Evaluation and Plan of Care      Patient: Jada Butler   : 1955  Diagnosis/ICD-10 Code:  Chronic left shoulder pain [M25.512, G89.29]  Referring practitioner: Stevo Zeng MD  Date of Initial Visit: 2021  Today's Date: 2021  Patient seen for 1 sessions    Progress note due: 2021  Re-cert due: 2021           Subjective Questionnaire: UEFI: 70/80      Subjective Evaluation    History of Present Illness  Onset date: \"months\"  Mechanism of injury: Denies any falls    Subjective comment: Pt states she is getting pain down the left shoulder, elbow, and wrist. She denies any falls. She states the pain has been around for a long time. She has been going to the gym but only doing core and leg exercises.  She states her L hand goes numb time to time as well.  She states  her symptoms are worse when keeping her elbow bent for a long period of time.   Patient Occupation: retired Pain  Current pain ratin  At best pain ratin  At worst pain rating: 10    Diagnostic Tests  X-ray: normal             Objective          Static Posture     Head  Forward.    Active Range of Motion   Cervical/Thoracic Spine   Cervical    Subcranial retraction: WFL   Left rotation: WFL and with pain  Right rotation: WFL  Left Shoulder   Normal active range of motion    Right Shoulder   Normal active range of motion  Flexion: with pain  Extension: with pain  Abduction: with pain  External rotation 0°: with pain  Internal rotation 0°: with pain    Left Elbow   Normal active range of motion    Right Elbow   Normal active range of motion    Left Wrist   Normal active range of motion    Right Wrist   Normal active range of motion    Strength/Myotome Testing     Left Shoulder     Planes of Motion   Flexion: 4+   Extension: 4+     Right Shoulder     Planes of Motion   Flexion: 4+   Extension: 4+     Left Elbow   Flexion: 4+  Extension: 4+    Right Elbow   Flexion: 4+  Extension: 4+    "       Assessment & Plan     Assessment  Impairments: abnormal muscle firing, abnormal or restricted ROM, activity intolerance, impaired physical strength and pain with function  Assessment details: Pt demonstrates the above functional limitations and will benefit from skilled PT services to return to PLOF.   Prognosis: good  Functional Limitations: carrying objects, lifting, sleeping, pulling, pushing, reaching behind back, reaching overhead and unable to perform repetitive tasks  Goals  Plan Goals: 1. The patient has limited ROM of the R shoulder.    LTG 1: 6 weeks:  The patient will demonstrate 150 degrees of R shoulder flexion, 140 degrees of shoulder abduction, 70 degrees of shoulder external rotation, and 75 degrees of shoulder internal rotation to allow the patient to reach into upper kitchen cabinets and manipulate clothing behind the back with greater ease.    STATUS:  Progressing    STG 1a: 6 weeks:  The patient will demonstrate 120 degrees of R shoulder flexion, 110 degrees of shoulder abduction, 50 degrees of shoulder external rotation, and 70 degrees of shoulder internal rotation.    STATUS:  Progressing    TREATMENT: Manual therapy, therapeutic exercise, home exercise instruction, and modalities as needed to include: electrical stimulation, ultrasound, moist heat, and ice.    2. The patient has limited strength of the R shoulder.   LTG 2: 6weeks:  The patient will demonstrate 4+/5 strength for R shoulder flexion, abduction, external rotation, and internal rotation in order to demonstrate improved shoulder stability.    STATUS:  Progressing    STG 2a: 3 weeks:  The patient will demonstrate 4/5 strength for R shoulder flexion, abduction, external rotation, and internal rotation.    STATUS:  MET   STG2b:  3 weeks:  The patient will be independent with home exercises.     STATUS:  MET   TREATMENT: Manual therapy, therapeutic exercise, home exercise instruction, and modalities as needed to include:  electrical stimulation, ultrasound, moist heat, and ice.     3. The patient complains of pain to the R shoulder.   LTG 3: 6 weeks:  The patient will report a pain rating of 2/10 or better in order to improve sleep quality and tolerance to performance of activities of daily living.    STATUS:  MET   STG 3a: 3 weeks:  The patient will report a pain rating of 5/10 or better.     STATUS:  MET   TREATMENT: Manual therapy, therapeutic exercise, home exercise instruction, and modalities as needed to include: electrical stimulation, ultrasound, moist heat, and ice.    4. Carrying, Moving, and Handling Objects Functional Limitation     LTG 4: 6weeks:  The patient will demonstrate UEFI score to 78/80.     STATUS: Progressing      TREATMENT:  Manual therapy, therapeutic exercise, home exercise instruction, and modalities as needed to include: moist heat, electrical stimulation, and ultrasound.    Plan  Planned modality interventions: cryotherapy and TENS  Planned therapy interventions: manual therapy, neuromuscular re-education, soft tissue mobilization, strengthening, stretching, therapeutic activities, joint mobilization, home exercise program, functional ROM exercises and flexibility  Frequency: 2x week  Duration in weeks: 6  Treatment plan discussed with: patient        Visit Diagnoses:    ICD-10-CM ICD-9-CM   1. Chronic left shoulder pain  M25.512 719.41    G89.29 338.29   2. Left elbow pain  M25.522 719.42   3. Left wrist pain  M25.532 719.43       Timed:  Manual Therapy:    0     mins  15461;  Therapeutic Exercise:    0     mins  17867;     Neuromuscular Elly:    0    mins  43286;    Therapeutic Activity:     0     mins  74950;     Gait Trainin     mins  89261;     Ultrasound:     0     mins  97453;    Electrical Stimulation:    0     mins  62191 ( );    Untimed:  Electrical Stimulation:    0     mins  11703 ( );  Mechanical Traction:    0     mins  70729;     Evaluation mod complexity: 30 min  14524    Timed Treatment:   0   mins   Total Treatment:     30   mins    PT SIGNATURE: Sandhya Mendoza PT, DPT        Initial Certification  Certification Period: 6/9/2021 thru 9/7/2021  I certify that the therapy services are furnished while this patient is under my care.  The services outlined above are required by this patient, and will be reviewed every 90 days.     PHYSICIAN: Stevo Zeng MD      DATE:     Please sign and return via fax to [phone].. Thank you, [facility].

## 2021-06-11 ENCOUNTER — TREATMENT (OUTPATIENT)
Dept: PHYSICAL THERAPY | Facility: CLINIC | Age: 66
End: 2021-06-11

## 2021-06-11 DIAGNOSIS — G89.29 CHRONIC LEFT SHOULDER PAIN: Primary | ICD-10-CM

## 2021-06-11 DIAGNOSIS — M25.512 CHRONIC LEFT SHOULDER PAIN: Primary | ICD-10-CM

## 2021-06-11 DIAGNOSIS — M25.522 LEFT ELBOW PAIN: ICD-10-CM

## 2021-06-11 DIAGNOSIS — M25.532 LEFT WRIST PAIN: ICD-10-CM

## 2021-06-11 PROCEDURE — 97110 THERAPEUTIC EXERCISES: CPT | Performed by: PHYSICAL THERAPIST

## 2021-06-11 NOTE — PROGRESS NOTES
Physical Therapy Daily Treatment Note      Patient: Jada Butler   : 1955  Referring practitioner: Stevo Zeng MD  Date of Initial Visit: Type: THERAPY  Noted: 2021  Today's Date: 2021  Patient seen for 2 sessions         Jada Butler reports: her shoulder pain is 0/10. It was 10/10 earlier but after a nap it felt better.       Subjective     Objective          Tenderness     Left Shoulder   Tenderness in the biceps tendon (proximal).     Active Range of Motion   Left Shoulder   Normal active range of motion  Flexion: with pain  Extension: with pain  Abduction: with pain  Adduction: with pain  External rotation 90°: with pain  Internal rotation 90°: with pain      See Exercise, Manual, and Modality Logs for complete treatment.       Assessment & Plan     Assessment  Impairments: abnormal muscle firing, abnormal or restricted ROM, activity intolerance, impaired physical strength and pain with function  Assessment details: Pt tolerated treatment and demonstrates increased L shoulder IR following treatment. Progress patient next visit as tolerated.   Functional Limitations: carrying objects, lifting, sleeping, pulling, pushing, reaching behind back, reaching overhead and unable to perform repetitive tasks  Plan  Planned therapy interventions: manual therapy, neuromuscular re-education, soft tissue mobilization, strengthening, stretching, therapeutic activities, joint mobilization, home exercise program, functional ROM exercises and flexibility        Visit Diagnoses:    ICD-10-CM ICD-9-CM   1. Chronic left shoulder pain  M25.512 719.41    G89.29 338.29   2. Left elbow pain  M25.522 719.42   3. Left wrist pain  M25.532 719.43       Progress per Plan of Care           Timed:  Manual Therapy:    0     mins  14967;  Therapeutic Exercise:    28     mins  50709;     Neuromuscular Elly:    0    mins  12816;    Therapeutic Activity:     0     mins  30868;     Gait Trainin     mins  61692;      Ultrasound:     0     mins  19468;    Electrical Stimulation:    0     mins  66311 ( );    Untimed:  Electrical Stimulation:    0     mins  66365 ( );  Mechanical Traction:    0     mins  54341;     Timed Treatment:   28   mins   Total Treatment:     28   mins  Sandhya Mendoza PT, DPT  Physical Therapist

## 2021-06-15 ENCOUNTER — TREATMENT (OUTPATIENT)
Dept: PHYSICAL THERAPY | Facility: CLINIC | Age: 66
End: 2021-06-15

## 2021-06-15 DIAGNOSIS — M25.532 LEFT WRIST PAIN: ICD-10-CM

## 2021-06-15 DIAGNOSIS — M25.522 LEFT ELBOW PAIN: ICD-10-CM

## 2021-06-15 DIAGNOSIS — M25.512 CHRONIC LEFT SHOULDER PAIN: Primary | ICD-10-CM

## 2021-06-15 DIAGNOSIS — G89.29 CHRONIC LEFT SHOULDER PAIN: Primary | ICD-10-CM

## 2021-06-15 PROCEDURE — 97110 THERAPEUTIC EXERCISES: CPT | Performed by: PHYSICAL THERAPIST

## 2021-06-15 NOTE — PROGRESS NOTES
Physical Therapy Daily Treatment Note      Patient: Jada Butler   : 1955  Referring practitioner: Stevo Zeng MD  Date of Initial Visit: Type: THERAPY  Noted: 2021  Today's Date: 6/15/2021  Patient seen for 3 sessions         Jada Butler reports: shoulder is getting better. No pain today 0/10      Subjective Evaluation    Pain  Current pain ratin           Objective          Active Range of Motion   Left Shoulder   Flexion: WFL and with pain  Abduction: WFL and with pain    Right Shoulder   Normal active range of motion      See Exercise, Manual, and Modality Logs for complete treatment.       Assessment & Plan     Assessment  Impairments: abnormal muscle firing, abnormal or restricted ROM, activity intolerance, impaired physical strength and pain with function  Assessment details: Pt tolerated treatment and demonstrates decreased in L shoulder pain and improved mobility as compared to previous session. Progress patient next visit as tolerated.   Functional Limitations: carrying objects, lifting, sleeping, pulling, pushing, reaching behind back, reaching overhead and unable to perform repetitive tasks  Plan  Planned therapy interventions: manual therapy, neuromuscular re-education, soft tissue mobilization, strengthening, stretching, therapeutic activities, joint mobilization, home exercise program, functional ROM exercises and flexibility        Visit Diagnoses:    ICD-10-CM ICD-9-CM   1. Chronic left shoulder pain  M25.512 719.41    G89.29 338.29   2. Left elbow pain  M25.522 719.42   3. Left wrist pain  M25.532 719.43       Progress per Plan of Care           Timed:  Manual Therapy:    0     mins  56119;  Therapeutic Exercise:    25     mins  74239;     Neuromuscular Elly:    0    mins  43651;    Therapeutic Activity:     0     mins  17698;     Gait Trainin     mins  62842;     Ultrasound:     0     mins  98288;    Electrical Stimulation:    0     mins  03445 (  );    Untimed:  Electrical Stimulation:    0     mins  58911 ( );  Mechanical Traction:    0     mins  69723;     Timed Treatment:   25   mins   Total Treatment:     25   mins  Sandhya Mendoza PT, DPT  Physical Therapist

## 2021-06-18 ENCOUNTER — TREATMENT (OUTPATIENT)
Dept: PHYSICAL THERAPY | Facility: CLINIC | Age: 66
End: 2021-06-18

## 2021-06-18 DIAGNOSIS — M25.512 CHRONIC LEFT SHOULDER PAIN: Primary | ICD-10-CM

## 2021-06-18 DIAGNOSIS — M25.532 LEFT WRIST PAIN: ICD-10-CM

## 2021-06-18 DIAGNOSIS — G89.29 CHRONIC LEFT SHOULDER PAIN: Primary | ICD-10-CM

## 2021-06-18 DIAGNOSIS — M25.522 LEFT ELBOW PAIN: ICD-10-CM

## 2021-06-18 PROCEDURE — 97110 THERAPEUTIC EXERCISES: CPT | Performed by: PHYSICAL THERAPIST

## 2021-06-18 NOTE — PROGRESS NOTES
Physical Therapy Daily Treatment Note      Patient: Jada Butler   : 1955  Referring practitioner: Stevo Zeng MD  Date of Initial Visit: Type: THERAPY  Noted: 2021  Today's Date: 2021  Patient seen for 4 sessions         Jada Butler reports: no complaints. Pt states her shoulder seems to be getting better.       Subjective     Objective          Active Range of Motion   Left Shoulder   Normal active range of motion    Right Shoulder   Normal active range of motion      See Exercise, Manual, and Modality Logs for complete treatment.       Assessment & Plan     Assessment  Assessment details: Pt tolerated treatment and demonstrate improved L shoulder strength and endurance as compared to previous session. Progress patient next visit as tolerated.         Visit Diagnoses:    ICD-10-CM ICD-9-CM   1. Chronic left shoulder pain  M25.512 719.41    G89.29 338.29   2. Left elbow pain  M25.522 719.42   3. Left wrist pain  M25.532 719.43       Progress per Plan of Care           Timed:  Manual Therapy:    0     mins  96126;  Therapeutic Exercise:    23     mins  18286;     Neuromuscular Elly:    0    mins  32652;    Therapeutic Activity:     0     mins  49792;     Gait Trainin     mins  05224;     Ultrasound:     0     mins  21478;    Electrical Stimulation:    0     mins  21303 ( );    Untimed:  Electrical Stimulation:    0     mins  08417 ( );  Mechanical Traction:    0     mins  24340;     Timed Treatment:   23   mins   Total Treatment:     23   mins  Sandhya Mendoza PT, DPT  Physical Therapist

## 2021-06-22 ENCOUNTER — TREATMENT (OUTPATIENT)
Dept: PHYSICAL THERAPY | Facility: CLINIC | Age: 66
End: 2021-06-22

## 2021-06-22 DIAGNOSIS — G89.29 CHRONIC LEFT SHOULDER PAIN: Primary | ICD-10-CM

## 2021-06-22 DIAGNOSIS — M25.522 LEFT ELBOW PAIN: ICD-10-CM

## 2021-06-22 DIAGNOSIS — M25.532 LEFT WRIST PAIN: ICD-10-CM

## 2021-06-22 DIAGNOSIS — M25.512 CHRONIC LEFT SHOULDER PAIN: Primary | ICD-10-CM

## 2021-06-22 PROCEDURE — 97110 THERAPEUTIC EXERCISES: CPT | Performed by: PHYSICAL THERAPIST

## 2021-06-22 NOTE — PROGRESS NOTES
Physical Therapy Daily Treatment Note      Patient: Jada Butler   : 1955  Referring practitioner: Stevo Zeng MD  Date of Initial Visit: Type: THERAPY  Noted: 2021  Today's Date: 2021  Patient seen for 5 sessions         Jada Butler reports: her shoulder seems to be getting better.       Subjective     Objective   See Exercise, Manual, and Modality Logs for complete treatment.       Assessment & Plan     Assessment  Assessment details: Pt tolerated treatment and demonstrates improvement in L shoulder strength with decreased complaints of pain. Progress patient next visit as tolerated.         Visit Diagnoses:    ICD-10-CM ICD-9-CM   1. Chronic left shoulder pain  M25.512 719.41    G89.29 338.29   2. Left elbow pain  M25.522 719.42   3. Left wrist pain  M25.532 719.43       Progress per Plan of Care           Timed:  Manual Therapy:    0     mins  09412;  Therapeutic Exercise:    30     mins  23765;     Neuromuscular Elly:    0    mins  67585;    Therapeutic Activity:     0     mins  56961;     Gait Trainin     mins  60039;     Ultrasound:     0     mins  67931;    Electrical Stimulation:    0     mins  18366 ( );    Untimed:  Electrical Stimulation:    0     mins  84438 ( );  Mechanical Traction:    0     mins  95244;     Timed Treatment:   30   mins   Total Treatment:     30   mins  Sandhya Mendoza PT, DPT  Physical Therapist

## 2021-06-29 ENCOUNTER — TREATMENT (OUTPATIENT)
Dept: PHYSICAL THERAPY | Facility: CLINIC | Age: 66
End: 2021-06-29

## 2021-06-29 DIAGNOSIS — G89.29 CHRONIC LEFT SHOULDER PAIN: Primary | ICD-10-CM

## 2021-06-29 DIAGNOSIS — M25.512 CHRONIC LEFT SHOULDER PAIN: Primary | ICD-10-CM

## 2021-06-29 DIAGNOSIS — M25.522 LEFT ELBOW PAIN: ICD-10-CM

## 2021-06-29 DIAGNOSIS — M25.532 LEFT WRIST PAIN: ICD-10-CM

## 2021-06-29 PROCEDURE — 97110 THERAPEUTIC EXERCISES: CPT | Performed by: PHYSICAL THERAPIST

## 2021-06-29 NOTE — PROGRESS NOTES
Physical Therapy Daily Treatment Note      Patient: Jada Butler   : 1955  Referring practitioner: Stevo Zeng MD  Date of Initial Visit: Type: THERAPY  Noted: 2021  Today's Date: 2021  Patient seen for 6 sessions         Jada Butler reports: 0/10 pain currently in the shoulder.       Subjective     Objective          Active Range of Motion   Left Shoulder   Normal active range of motion    Right Shoulder   Normal active range of motion    Strength/Myotome Testing     Left Wrist/Hand      (2nd hand position)     Trial 1: 40 lbs    Right Wrist/Hand      (2nd hand position)     Trial 1: 45 lbs    Thumb Strength   Key/Lateral Pinch     Trial 1: 45 lbs      See Exercise, Manual, and Modality Logs for complete treatment.       Assessment & Plan     Assessment  Assessment details: Pt tolerated treatment and demonstrates improved left shoulder strength and stability. Progress patient next visit as tolerated.         Visit Diagnoses:    ICD-10-CM ICD-9-CM   1. Chronic left shoulder pain  M25.512 719.41    G89.29 338.29   2. Left elbow pain  M25.522 719.42   3. Left wrist pain  M25.532 719.43       Progress per Plan of Care           Timed:         Manual Therapy:    0     mins  76391;     Therapeutic Exercise:    27     mins  77634;     Neuromuscular Elly:    0    mins  78651;    Therapeutic Activity:     0     mins  18338;     Gait Trainin     mins  97446;     Ultrasound:     0     mins  41584;    Ionto                               0    mins   99124  Self-care  __0__ mins 60365    Un-Timed:  Electrical Stimulation:    0     mins  77897 (MC );  Traction     0     mins 68223  Hot pack     0     Mins    Cold pack                       0     Mins      Timed Treatment:   27   mins   Total Treatment:     27   mins    Sandhya Mendoza PT, DPT  Physical Therapist

## 2021-07-02 ENCOUNTER — TREATMENT (OUTPATIENT)
Dept: PHYSICAL THERAPY | Facility: CLINIC | Age: 66
End: 2021-07-02

## 2021-07-02 DIAGNOSIS — G89.29 CHRONIC LEFT SHOULDER PAIN: Primary | ICD-10-CM

## 2021-07-02 DIAGNOSIS — M25.512 CHRONIC LEFT SHOULDER PAIN: Primary | ICD-10-CM

## 2021-07-02 DIAGNOSIS — M25.532 LEFT WRIST PAIN: ICD-10-CM

## 2021-07-02 DIAGNOSIS — M25.522 LEFT ELBOW PAIN: ICD-10-CM

## 2021-07-02 PROCEDURE — 97110 THERAPEUTIC EXERCISES: CPT | Performed by: PHYSICAL THERAPIST

## 2021-07-02 NOTE — PROGRESS NOTES
Physical Therapy Daily Treatment Note      Patient: Jada Butler   : 1955  Referring practitioner: Stevo Zeng MD  Date of Initial Visit: Type: THERAPY  Noted: 2021  Today's Date: 2021  Patient seen for 7 sessions         Jada Butler reports: her only complaints are she L shoulder hurts at night when trying to sleep.       Subjective     Objective   See Exercise, Manual, and Modality Logs for complete treatment.       Assessment & Plan     Assessment  Assessment details: Pt tolerated treatment and demonstrates improved L shoulder strength and minimal report of pain.         Visit Diagnoses:    ICD-10-CM ICD-9-CM   1. Chronic left shoulder pain  M25.512 719.41    G89.29 338.29   2. Left elbow pain  M25.522 719.42   3. Left wrist pain  M25.532 719.43       Progress per Plan of Care           Timed:         Manual Therapy:    0     mins  52056;     Therapeutic Exercise:    30     mins  39600;     Neuromuscular Elly:    0    mins  83544;    Therapeutic Activity:     0     mins  26912;     Gait Trainin     mins  04569;     Ultrasound:     0     mins  62565;    Ionto                               0    mins   63406  Self-care  __0__ mins 00840    Un-Timed:  Electrical Stimulation:    0     mins  43447 (MC );  Traction     0     mins 22720  Hot pack     0     Mins    Cold pack                       0     Mins      Timed Treatment:   30   mins   Total Treatment:     30   mins    Sandhya Mendoza PT, DPT  Physical Therapist

## 2021-07-15 VITALS — OXYGEN SATURATION: 98 % | WEIGHT: 175.5 LBS | HEIGHT: 63 IN | BODY MASS INDEX: 31.1 KG/M2 | HEART RATE: 105 BPM

## 2021-08-12 ENCOUNTER — DOCUMENTATION (OUTPATIENT)
Dept: PHYSICAL THERAPY | Facility: CLINIC | Age: 66
End: 2021-08-12

## 2021-08-12 NOTE — PROGRESS NOTES
Discharge Summary  Discharge Summary from Physical Therapy Report        Discharge Status of Patient: See MD Note dated     Goals: Partially Met    Discharge Plan: Continue with current home exercise program as instructed    Comments Pt agreeable to continue with HEP at home    Date of Discharge 8/12/21        Sandhya Mendoza, PT  Physical Therapist

## 2021-10-25 ENCOUNTER — TELEPHONE (OUTPATIENT)
Dept: SURGERY | Facility: CLINIC | Age: 66
End: 2021-10-25

## 2021-10-25 NOTE — TELEPHONE ENCOUNTER
Pt saw Dr. Shaffer and has been taking cholestyramine for years. She wants to know if you can continue to prescribe it for her and does she need to come in for a visit to establish care in order to keep getting the med.

## 2021-10-26 NOTE — TELEPHONE ENCOUNTER
Pt called back to see about status of message. I told her that you said she needed to see her PCP. She said she only has one day left and will get very sick if she runs out. She wants to know if you would be amenable to prescribing a month or two worth until she can get in to see her PCP?      [phone]

## 2021-10-27 ENCOUNTER — TELEPHONE (OUTPATIENT)
Dept: SURGERY | Facility: CLINIC | Age: 66
End: 2021-10-27

## 2021-10-27 RX ORDER — CHOLESTYRAMINE 4 G/9G
1 POWDER, FOR SUSPENSION ORAL DAILY
Qty: 14 PACKET | Refills: 0 | Status: SHIPPED | OUTPATIENT
Start: 2021-10-27 | End: 2022-03-29

## 2021-10-27 NOTE — TELEPHONE ENCOUNTER
Called pt back to let her know per April she would send her in 2 weeks of medication and after the pt needed to go see her PCP and get a referral to be seen here.

## 2021-11-03 ENCOUNTER — TRANSCRIBE ORDERS (OUTPATIENT)
Dept: ADMINISTRATIVE | Facility: HOSPITAL | Age: 66
End: 2021-11-03

## 2021-11-03 ENCOUNTER — HOSPITAL ENCOUNTER (OUTPATIENT)
Dept: GENERAL RADIOLOGY | Facility: HOSPITAL | Age: 66
Discharge: HOME | End: 2021-11-03
Admitting: NURSE PRACTITIONER

## 2021-11-03 DIAGNOSIS — R07.1 PAINFUL RESPIRATION: ICD-10-CM

## 2021-11-03 DIAGNOSIS — R07.1 PAINFUL RESPIRATION: Primary | ICD-10-CM

## 2021-11-03 PROCEDURE — 71046 X-RAY EXAM CHEST 2 VIEWS: CPT

## 2021-11-04 PROCEDURE — U0004 COV-19 TEST NON-CDC HGH THRU: HCPCS | Performed by: PHYSICIAN ASSISTANT

## 2021-11-10 ENCOUNTER — HOSPITAL ENCOUNTER (EMERGENCY)
Facility: HOSPITAL | Age: 66
Discharge: HOME | End: 2021-11-11
Attending: EMERGENCY MEDICINE | Admitting: EMERGENCY MEDICINE

## 2021-11-10 ENCOUNTER — APPOINTMENT (OUTPATIENT)
Dept: CT IMAGING | Facility: HOSPITAL | Age: 66
End: 2021-11-10

## 2021-11-10 DIAGNOSIS — N39.0 ACUTE UTI: Primary | ICD-10-CM

## 2021-11-10 DIAGNOSIS — R10.2 SUPRAPUBIC PAIN, ACUTE: ICD-10-CM

## 2021-11-10 LAB
ALBUMIN SERPL-MCNC: 4.5 G/DL (ref 3.5–5.2)
ALBUMIN/GLOB SERPL: 1.6 G/DL
ALP SERPL-CCNC: 66 U/L (ref 39–117)
ALT SERPL W P-5'-P-CCNC: 23 U/L (ref 1–33)
ANION GAP SERPL CALCULATED.3IONS-SCNC: 10.6 MMOL/L (ref 5–15)
AST SERPL-CCNC: 22 U/L (ref 1–32)
BACTERIA UR QL AUTO: ABNORMAL /HPF
BASOPHILS # BLD AUTO: 0.1 10*3/MM3 (ref 0–0.2)
BASOPHILS NFR BLD AUTO: 1.1 % (ref 0–1.5)
BILIRUB SERPL-MCNC: 0.4 MG/DL (ref 0–1.2)
BILIRUB UR QL STRIP: NEGATIVE
BUN SERPL-MCNC: 21 MG/DL (ref 8–23)
BUN/CREAT SERPL: 26.3 (ref 7–25)
CALCIUM SPEC-SCNC: 9.5 MG/DL (ref 8.6–10.5)
CHLORIDE SERPL-SCNC: 101 MMOL/L (ref 98–107)
CLARITY UR: CLEAR
CO2 SERPL-SCNC: 27.4 MMOL/L (ref 22–29)
COLOR UR: YELLOW
CREAT SERPL-MCNC: 0.8 MG/DL (ref 0.57–1)
DEPRECATED RDW RBC AUTO: 45.1 FL (ref 37–54)
EOSINOPHIL # BLD AUTO: 0.19 10*3/MM3 (ref 0–0.4)
EOSINOPHIL NFR BLD AUTO: 2.2 % (ref 0.3–6.2)
ERYTHROCYTE [DISTWIDTH] IN BLOOD BY AUTOMATED COUNT: 14 % (ref 12.3–15.4)
GFR SERPL CREATININE-BSD FRML MDRD: 72 ML/MIN/1.73
GLOBULIN UR ELPH-MCNC: 2.9 GM/DL
GLUCOSE SERPL-MCNC: 105 MG/DL (ref 65–99)
GLUCOSE UR STRIP-MCNC: NEGATIVE MG/DL
HCT VFR BLD AUTO: 38 % (ref 34–46.6)
HGB BLD-MCNC: 12.3 G/DL (ref 12–15.9)
HGB UR QL STRIP.AUTO: ABNORMAL
HOLD SPECIMEN: NORMAL
HOLD SPECIMEN: NORMAL
HYALINE CASTS UR QL AUTO: ABNORMAL /LPF
IMM GRANULOCYTES # BLD AUTO: 0.03 10*3/MM3 (ref 0–0.05)
IMM GRANULOCYTES NFR BLD AUTO: 0.3 % (ref 0–0.5)
KETONES UR QL STRIP: NEGATIVE
LEUKOCYTE ESTERASE UR QL STRIP.AUTO: ABNORMAL
LIPASE SERPL-CCNC: 25 U/L (ref 13–60)
LYMPHOCYTES # BLD AUTO: 3.2 10*3/MM3 (ref 0.7–3.1)
LYMPHOCYTES NFR BLD AUTO: 36.5 % (ref 19.6–45.3)
MCH RBC QN AUTO: 28.3 PG (ref 26.6–33)
MCHC RBC AUTO-ENTMCNC: 32.4 G/DL (ref 31.5–35.7)
MCV RBC AUTO: 87.4 FL (ref 79–97)
MONOCYTES # BLD AUTO: 0.56 10*3/MM3 (ref 0.1–0.9)
MONOCYTES NFR BLD AUTO: 6.4 % (ref 5–12)
NEUTROPHILS NFR BLD AUTO: 4.69 10*3/MM3 (ref 1.7–7)
NEUTROPHILS NFR BLD AUTO: 53.5 % (ref 42.7–76)
NITRITE UR QL STRIP: NEGATIVE
NRBC BLD AUTO-RTO: 0 /100 WBC (ref 0–0.2)
PH UR STRIP.AUTO: 5.5 [PH] (ref 5–8)
PLATELET # BLD AUTO: 278 10*3/MM3 (ref 140–450)
PMV BLD AUTO: 9.1 FL (ref 6–12)
POTASSIUM SERPL-SCNC: 4.3 MMOL/L (ref 3.5–5.2)
PROT SERPL-MCNC: 7.4 G/DL (ref 6–8.5)
PROT UR QL STRIP: NEGATIVE
RBC # BLD AUTO: 4.35 10*6/MM3 (ref 3.77–5.28)
RBC # UR: ABNORMAL /HPF
REF LAB TEST METHOD: ABNORMAL
SODIUM SERPL-SCNC: 139 MMOL/L (ref 136–145)
SP GR UR STRIP: 1.02 (ref 1–1.03)
SQUAMOUS #/AREA URNS HPF: ABNORMAL /HPF
UROBILINOGEN UR QL STRIP: ABNORMAL
WBC # BLD AUTO: 8.77 10*3/MM3 (ref 3.4–10.8)
WBC UR QL AUTO: ABNORMAL /HPF
WHOLE BLOOD HOLD SPECIMEN: NORMAL
WHOLE BLOOD HOLD SPECIMEN: NORMAL

## 2021-11-10 PROCEDURE — 0 IOPAMIDOL PER 1 ML: Performed by: EMERGENCY MEDICINE

## 2021-11-10 PROCEDURE — 87086 URINE CULTURE/COLONY COUNT: CPT | Performed by: EMERGENCY MEDICINE

## 2021-11-10 PROCEDURE — 74177 CT ABD & PELVIS W/CONTRAST: CPT

## 2021-11-10 PROCEDURE — 96365 THER/PROPH/DIAG IV INF INIT: CPT

## 2021-11-10 PROCEDURE — 81001 URINALYSIS AUTO W/SCOPE: CPT | Performed by: EMERGENCY MEDICINE

## 2021-11-10 PROCEDURE — 25010000002 MORPHINE PER 10 MG: Performed by: EMERGENCY MEDICINE

## 2021-11-10 PROCEDURE — 25010000002 ONDANSETRON PER 1 MG: Performed by: EMERGENCY MEDICINE

## 2021-11-10 PROCEDURE — 80053 COMPREHEN METABOLIC PANEL: CPT

## 2021-11-10 PROCEDURE — 96375 TX/PRO/DX INJ NEW DRUG ADDON: CPT

## 2021-11-10 PROCEDURE — 83690 ASSAY OF LIPASE: CPT

## 2021-11-10 PROCEDURE — 99283 EMERGENCY DEPT VISIT LOW MDM: CPT

## 2021-11-10 PROCEDURE — 25010000002 CEFTRIAXONE PER 250 MG: Performed by: EMERGENCY MEDICINE

## 2021-11-10 PROCEDURE — 85025 COMPLETE CBC W/AUTO DIFF WBC: CPT

## 2021-11-10 PROCEDURE — 36415 COLL VENOUS BLD VENIPUNCTURE: CPT

## 2021-11-10 RX ORDER — PHENAZOPYRIDINE HYDROCHLORIDE 200 MG/1
200 TABLET, FILM COATED ORAL 3 TIMES DAILY PRN
Qty: 6 TABLET | Refills: 0 | Status: SHIPPED | OUTPATIENT
Start: 2021-11-10 | End: 2021-11-12

## 2021-11-10 RX ORDER — CEFTRIAXONE SODIUM 1 G/50ML
1 INJECTION, SOLUTION INTRAVENOUS ONCE
Status: COMPLETED | OUTPATIENT
Start: 2021-11-10 | End: 2021-11-11

## 2021-11-10 RX ORDER — SODIUM CHLORIDE 0.9 % (FLUSH) 0.9 %
10 SYRINGE (ML) INJECTION AS NEEDED
Status: DISCONTINUED | OUTPATIENT
Start: 2021-11-10 | End: 2021-11-11 | Stop reason: HOSPADM

## 2021-11-10 RX ORDER — ONDANSETRON 2 MG/ML
4 INJECTION INTRAMUSCULAR; INTRAVENOUS ONCE
Status: COMPLETED | OUTPATIENT
Start: 2021-11-10 | End: 2021-11-10

## 2021-11-10 RX ORDER — CEFUROXIME AXETIL 500 MG/1
500 TABLET ORAL 2 TIMES DAILY
Qty: 14 TABLET | Refills: 0 | Status: SHIPPED | OUTPATIENT
Start: 2021-11-10 | End: 2021-11-17

## 2021-11-10 RX ADMIN — CEFTRIAXONE SODIUM 1 G: 1 INJECTION, SOLUTION INTRAVENOUS at 23:59

## 2021-11-10 RX ADMIN — ONDANSETRON 4 MG: 2 INJECTION INTRAMUSCULAR; INTRAVENOUS at 22:58

## 2021-11-10 RX ADMIN — SODIUM CHLORIDE 1000 ML: 9 INJECTION, SOLUTION INTRAVENOUS at 22:57

## 2021-11-10 RX ADMIN — IOPAMIDOL 100 ML: 755 INJECTION, SOLUTION INTRAVENOUS at 22:38

## 2021-11-10 RX ADMIN — MORPHINE SULFATE 4 MG: 4 INJECTION INTRAVENOUS at 22:59

## 2021-11-11 VITALS
TEMPERATURE: 98.6 F | HEART RATE: 97 BPM | WEIGHT: 170.42 LBS | SYSTOLIC BLOOD PRESSURE: 133 MMHG | OXYGEN SATURATION: 94 % | BODY MASS INDEX: 30.2 KG/M2 | HEIGHT: 63 IN | DIASTOLIC BLOOD PRESSURE: 82 MMHG | RESPIRATION RATE: 16 BRPM

## 2021-11-11 NOTE — ED PROVIDER NOTES
Time: 10:00 PM EST  Arrived by: private car  Chief Complaint: abdominal pain    History of Present Illness:  Patient is a 65 y.o. year old female that presents to the emergency department with abdominal pain located in the right groin area that radiates to the RLQ. Pt symptoms started around 12pm today. Pt last BM was today which was normal. Pt tried taking some Tramadol with no relief.     Pt has had abdominal surgeries in the past.       Abdominal Pain  Pain location: right groin.  Pain radiates to:  RLQ  Pain severity:  Moderate  Onset quality:  Gradual  Duration:  10 hours  Timing:  Constant  Progression:  Worsening  Chronicity:  New  Relieved by:  Nothing  Worsened by:  Nothing  Associated symptoms: no chest pain, no chills, no constipation, no cough, no diarrhea, no dysuria, no fever, no hematochezia, no hematuria, no nausea, no shortness of breath and no vomiting        Similar Symptoms Previously: no  Recently seen: yes      Patient Care Team  Primary Care Provider: Rain Mccall APRN    Past Medical History:     Allergies   Allergen Reactions   • Amoxicillin-Pot Clavulanate GI Intolerance   • Meloxicam Hives     Past Medical History:   Diagnosis Date   • Arthritis    • Diabetes mellitus (HCC)    • Hyperlipidemia    • Hypertension      Past Surgical History:   Procedure Laterality Date   • BACK SURGERY     • BREAST SURGERY     • CHOLECYSTECTOMY     • HERNIA REPAIR     • HYSTERECTOMY     • KNEE SURGERY     • TUBAL ABDOMINAL LIGATION     • WRIST SURGERY       History reviewed. No pertinent family history.    Home Medications:  Prior to Admission medications    Medication Sig Start Date End Date Taking? Authorizing Provider   amLODIPine (NORVASC) 5 MG tablet Take 5 mg by mouth Daily. 9/9/21   Emergency, Nurse Alicia RN   cholestyramine (Questran) 4 g packet Take 1 packet by mouth Daily. 10/27/21   Viridiana Chaves APRN   diclofenac (VOLTAREN) 50 MG EC tablet Take 50 mg by mouth 2 (Two) Times a Day. 9/16/21    Emergency, Nurse Epic, RN   dicyclomine (BENTYL) 20 MG tablet dicyclomine 20 mg oral tablet take 1 tablet (20 mg) by oral route 3 times per day   Suspended    Emergency, Nurse Alicia RN   ergocalciferol (ERGOCALCIFEROL) 1.25 MG (23021 UT) capsule Vitamin D2 50,000 unit oral capsule take 1 capsule (50,000 unit) by oral route once weekly   Active    Emergency, Nurse RAMSES Vargas   famotidine (PEPCID) 20 MG tablet famotidine 20 mg oral tablet take 1 tablet (20 mg) by oral route once daily at bedtime   Active    Emergency, Nurse Alicia RN   FLUoxetine (PROzac) 20 MG capsule Take 40 mg by mouth Daily. 10/21/21   Emergency, Nurse Epic, RN   glimepiride (AMARYL) 2 MG tablet glimepiride 2 mg oral tablet take 1 tablet (2 mg) by oral route once daily   Active    Epifanio, Nurse Alicia RN   losartan (COZAAR) 100 MG tablet Take 100 mg by mouth Daily. 9/9/21   Emergency, Nurse RAMSES Vargas   metFORMIN (GLUCOPHAGE) 500 MG tablet Take 500 mg by mouth 2 (Two) Times a Day. 10/15/21   Emergency, Nurse Alicia RN   omeprazole (priLOSEC) 40 MG capsule omeprazole 40 mg oral capsule,delayed release(DR/EC) take 1 capsule (40 mg) by oral route once daily before a meal   Suspended    Emergency, Nurse Vargas RN   pravastatin (PRAVACHOL) 10 MG tablet Take 10 mg by mouth Daily.    Epifanio, Nurse Alicia RN   propranolol XL (INNOPRAN XL) 80 MG 24 hr capsule propranolol 80 mg oral capsule,extended release 24 hr take 1 capsule (80 mg) by oral route once daily   Active    Emergency, Nurse RAMSES Vargas        Social History:   Social History     Tobacco Use   • Smoking status: Former Smoker   • Smokeless tobacco: Not on file   Substance Use Topics   • Alcohol use: Yes     Comment: occ   • Drug use: Not on file       Record Review:  I have reviewed the patient's records in [facility].     Review of Systems:  Review of Systems   Constitutional: Negative for chills and fever.   HENT: Negative for nosebleeds.    Eyes: Negative for redness.   Respiratory: Negative for cough  "and shortness of breath.    Cardiovascular: Negative for chest pain.   Gastrointestinal: Positive for abdominal pain. Negative for constipation, diarrhea, hematochezia, nausea and vomiting.   Genitourinary: Negative for dysuria, frequency and hematuria.   Musculoskeletal: Negative for back pain and neck pain.   Skin: Negative for rash.   Neurological: Negative for seizures and syncope.   All other systems reviewed and are negative.       Physical Exam:  /71   Pulse 89   Temp 98.6 °F (37 °C) (Oral)   Resp 16   Ht 160 cm (63\")   Wt 77.3 kg (170 lb 6.7 oz)   SpO2 97%   BMI 30.19 kg/m²     Physical Exam  Vitals and nursing note reviewed.   Constitutional:       General: She is not in acute distress.     Appearance: Normal appearance. She is not toxic-appearing.   HENT:      Head: Normocephalic and atraumatic.      Nose: Nose normal.      Mouth/Throat:      Mouth: Mucous membranes are moist.   Eyes:      General: Lids are normal. No scleral icterus.     Conjunctiva/sclera: Conjunctivae normal.   Cardiovascular:      Rate and Rhythm: Normal rate and regular rhythm.      Pulses: Normal pulses.      Heart sounds: Normal heart sounds. No murmur heard.      Pulmonary:      Effort: Pulmonary effort is normal. No respiratory distress.      Breath sounds: Normal breath sounds and air entry.   Chest:      Chest wall: No tenderness.   Abdominal:      Palpations: Abdomen is soft.      Tenderness: There is abdominal tenderness (moderate) in the right lower quadrant. There is no guarding or rebound.   Musculoskeletal:         General: No tenderness. Normal range of motion.      Cervical back: Normal range of motion and neck supple.      Right lower leg: No edema.      Left lower leg: No edema.      Comments: Moderate tenderness to right groin.    Skin:     General: Skin is warm and dry.      Findings: No rash.   Neurological:      Mental Status: She is alert and oriented to person, place, and time. Mental status is at " baseline.      Sensory: Sensation is intact.      Motor: Motor function is intact.   Psychiatric:         Behavior: Behavior normal.                Medications in the Emergency Department:  Medications   sodium chloride 0.9 % flush 10 mL (has no administration in time range)   sodium chloride 0.9 % bolus 1,000 mL (1,000 mL Intravenous New Bag 11/10/21 2257)   cefTRIAXone (ROCEPHIN) IVPB 1 g (has no administration in time range)   morphine injection 4 mg (4 mg Intravenous Given 11/10/21 2259)   ondansetron (ZOFRAN) injection 4 mg (4 mg Intravenous Given 11/10/21 2258)   iopamidol (ISOVUE-370) 76 % injection 100 mL (100 mL Intravenous Given 11/10/21 2238)        Labs  Lab Results (last 24 hours)     Procedure Component Value Units Date/Time    CBC & Differential [[id number]]  (Abnormal) Collected: 11/10/21 1707    Specimen: Blood Updated: 11/10/21 1715    Narrative:      The following orders were created for panel order CBC & Differential.  Procedure                               Abnormality         Status                     ---------                               -----------         ------                     CBC Auto Differential[[id number]]        Abnormal            Final result                 Please view results for these tests on the individual orders.    Comprehensive Metabolic Panel [[id number]]  (Abnormal) Collected: 11/10/21 1707    Specimen: Blood Updated: 11/10/21 1731     Glucose 105 mg/dL      BUN 21 mg/dL      Creatinine 0.80 mg/dL      Sodium 139 mmol/L      Potassium 4.3 mmol/L      Comment: Slight hemolysis detected by analyzer. Results may be affected.        Chloride 101 mmol/L      CO2 27.4 mmol/L      Calcium 9.5 mg/dL      Total Protein 7.4 g/dL      Albumin 4.50 g/dL      ALT (SGPT) 23 U/L      AST (SGOT) 22 U/L      Alkaline Phosphatase 66 U/L      Total Bilirubin 0.4 mg/dL      eGFR Non African Amer 72 mL/min/1.73      Globulin 2.9 gm/dL      A/G Ratio 1.6 g/dL      BUN/Creatinine Ratio 26.3      Anion Gap 10.6 mmol/L     Narrative:      GFR Normal >60  Chronic Kidney Disease <60  Kidney Failure <15      Lipase [[id number]]  (Normal) Collected: 11/10/21 1707    Specimen: Blood Updated: 11/10/21 1731     Lipase 25 U/L     CBC Auto Differential [[id number]]  (Abnormal) Collected: 11/10/21 1707    Specimen: Blood Updated: 11/10/21 1715     WBC 8.77 10*3/mm3      RBC 4.35 10*6/mm3      Hemoglobin 12.3 g/dL      Hematocrit 38.0 %      MCV 87.4 fL      MCH 28.3 pg      MCHC 32.4 g/dL      RDW 14.0 %      RDW-SD 45.1 fl      MPV 9.1 fL      Platelets 278 10*3/mm3      Neutrophil % 53.5 %      Lymphocyte % 36.5 %      Monocyte % 6.4 %      Eosinophil % 2.2 %      Basophil % 1.1 %      Immature Grans % 0.3 %      Neutrophils, Absolute 4.69 10*3/mm3      Lymphocytes, Absolute 3.20 10*3/mm3      Monocytes, Absolute 0.56 10*3/mm3      Eosinophils, Absolute 0.19 10*3/mm3      Basophils, Absolute 0.10 10*3/mm3      Immature Grans, Absolute 0.03 10*3/mm3      nRBC 0.0 /100 WBC     Urinalysis With Microscopic If Indicated (No Culture) - Urine, Clean Catch [[id number]]  (Abnormal) Collected: 11/10/21 2127    Specimen: Urine, Clean Catch Updated: 11/10/21 2150     Color, UA Yellow     Appearance, UA Clear     pH, UA 5.5     Specific Gravity, UA 1.018     Glucose, UA Negative     Ketones, UA Negative     Bilirubin, UA Negative     Blood, UA Trace     Protein, UA Negative     Leuk Esterase, UA Moderate (2+)     Nitrite, UA Negative     Urobilinogen, UA 0.2 E.U./dL    Urinalysis, Microscopic Only - Urine, Clean Catch [[id number]]  (Abnormal) Collected: 11/10/21 2127    Specimen: Urine, Clean Catch Updated: 11/10/21 2150     RBC, UA 0-2 /HPF      WBC, UA 21-30 /HPF      Bacteria, UA 1+ /HPF      Squamous Epithelial Cells, UA 0-2 /HPF      Hyaline Casts, UA 0-2 /LPF      Methodology Automated Microscopy           Imaging:  CT Abdomen Pelvis With Contrast    Result Date: 11/10/2021  PROCEDURE: CT ABDOMEN PELVIS W CONTRAST   COMPARISONS: [facility], CT, ABDOMEN/PELVIS W/ CONTRAST, 9/26/2020, 21:31.   [facility], CT, ABDOMEN/PELVIS W/ CONTRAST, 1/03/2021, 0:27.  INDICATIONS: RIGHT LOWER QUADRANT ABDOMINAL/ GROIN PAIN AND TENDERNESS.  TECHNIQUE: After obtaining the patient's consent, 531 CT images were created with non-ionic intravenous contrast material.  No oral contrast agent was administered for the study.  PROTOCOL:   Standard imaging protocol performed    RADIATION:   DLP: 515mGy*cm   Automated exposure control was utilized to minimize radiation dose. CONTRAST: 93cc Isovue 370 I.V. LABS:   eGFR: >60ml/min/1.73m2  FINDINGS: No acute findings are seen.  The appendix is thought to be identified, as on axial image 87 of series 201 and coronal image 72 of series 202 and adjacent images.  No acute appendicitis is seen.  The patient has undergone cholecystectomy, hysterectomy, bilateral inguinal hernia repair, and possible pelvic floor reconstructive surgery.  No recurrent inguinal hernia is suggested.  No enlarged inguinal or intra-abdominal or intrapelvic lymph nodes are seen.  Atherosclerotic change involves the aortoiliac arterial system without aneurysmal dilatation.  There are colonic diverticula without acute diverticulitis.  There is diffuse hepatic steatosis with hepatomegaly.  No splenomegaly.  There is slight chronic asymmetric elevation of the right diaphragm.  No acute infiltrate is seen in the imaged lung bases.  No renal stones or hydronephrosis or obstructive uropathy.  No mechanical bowel obstruction or pneumoperitoneum.  No urinary bladder wall thickening or urinary bladder calculi.  No acute pancreatitis.  Severe spinal stenosis is suggested at L4-5, seen previously.  No definite acute fracture is seen.  The other incidental nonemergent findings are as described in prior reports.  CONCLUSION: No acute findings are appreciated by enhanced CT examination of the abdomen and pelvis.     CARISSA WHITESIDE  MIGUE RIBERA MD       Electronically Signed and Approved By: CARISSA CAMARENA JR, MD on 11/10/2021 at 23:13               Procedures:  Procedures    Progress                            Medical Decision Making:  MDM     The patient´s CBC was reviewed and shows no abnormalities of critical concern. Of note, there is no anemia requiring a blood transfusion and the platelet count is acceptable.  The patient´s CMP was reviewed and shows no abnormalities of critical concern. Of note, the patient´s sodium and potassium are acceptable. The patient´s liver enzymes are unremarkable. The patient´s renal function (creatinine) is preserved. The patient has a normal anion gap.  Urinalysis is convincing for infection with 21-30 white cells and 1+ bacteria.  Is nitrite negative.  Will send for culture.  CT abdomen pelvis shows no acute intra-abdominal pathology.  Patient treated with ceftriaxone in the emergency department discharged home on Ceftin.  Patient agrees to follow-up with her primary care physician within 1 week.  We discussed return precautions including worsening symptoms or any additional concerns.      Final diagnoses:   Acute UTI   Suprapubic pain, acute        Disposition:  ED Disposition     ED Disposition Condition Comment    Discharge Stable             This medical record created using voice recognition software and may contain unintended errors.    Documentation assistance provided by Juanita Trujillo acting as scribe for Chai Schultz MD. Information recorded by the scribe was done at my direction and has been verified and validated by me.          Juanita Trujillo  11/10/21 4622       Chai Schultz MD  11/11/21 7696

## 2021-11-12 LAB — BACTERIA SPEC AEROBE CULT: NORMAL

## 2022-02-02 ENCOUNTER — OFFICE VISIT (OUTPATIENT)
Dept: ORTHOPEDIC SURGERY | Facility: CLINIC | Age: 67
End: 2022-02-02

## 2022-02-02 VITALS — HEIGHT: 63 IN | OXYGEN SATURATION: 95 % | WEIGHT: 170 LBS | HEART RATE: 111 BPM | BODY MASS INDEX: 30.12 KG/M2

## 2022-02-02 DIAGNOSIS — M25.512 LEFT SHOULDER PAIN, UNSPECIFIED CHRONICITY: Primary | ICD-10-CM

## 2022-02-02 PROCEDURE — 99213 OFFICE O/P EST LOW 20 MIN: CPT | Performed by: ORTHOPAEDIC SURGERY

## 2022-02-02 PROCEDURE — 20610 DRAIN/INJ JOINT/BURSA W/O US: CPT | Performed by: ORTHOPAEDIC SURGERY

## 2022-02-02 RX ORDER — BUPIVACAINE HYDROCHLORIDE 2.5 MG/ML
5 INJECTION, SOLUTION INFILTRATION; PERINEURAL
Status: COMPLETED | OUTPATIENT
Start: 2022-02-02 | End: 2022-02-02

## 2022-02-02 RX ORDER — TRAMADOL HYDROCHLORIDE 50 MG/1
50 TABLET ORAL EVERY 6 HOURS PRN
Qty: 30 TABLET | Refills: 0 | Status: SHIPPED | OUTPATIENT
Start: 2022-02-02 | End: 2022-03-02

## 2022-02-02 RX ORDER — TRIAMCINOLONE ACETONIDE 40 MG/ML
40 INJECTION, SUSPENSION INTRA-ARTICULAR; INTRAMUSCULAR
Status: COMPLETED | OUTPATIENT
Start: 2022-02-02 | End: 2022-02-02

## 2022-02-02 RX ADMIN — TRIAMCINOLONE ACETONIDE 40 MG: 40 INJECTION, SUSPENSION INTRA-ARTICULAR; INTRAMUSCULAR at 14:47

## 2022-02-02 RX ADMIN — BUPIVACAINE HYDROCHLORIDE 5 ML: 2.5 INJECTION, SOLUTION INFILTRATION; PERINEURAL at 14:47

## 2022-02-02 NOTE — PROGRESS NOTES
"Chief Complaint  Initial Evaluation of the Left Shoulder     Subjective      Jada Butler presents to [facility] for an evaluation of left shoulder. Patient has a history of left shoulder bursitis and lateral epicondylitis of the left arm. She was last seen for this on 5/26/21. Patient is present today with a sling. She had no injury or trauma to the left shoulder. Patient was in [place], where she went to the ER and x-rays were obtained. She went to the ER due to severity in her pain. She states pain on top of her shoulder and anterior shoulder. She states she doesn’t wear the sling consistently. Pain began 6 weeks ago. Patient recently received an injection, IM in the buttock.     Allergies   Allergen Reactions   • Amoxicillin-Pot Clavulanate GI Intolerance   • Meloxicam Hives        Social History     Socioeconomic History   • Marital status:    Tobacco Use   • Smoking status: Former Smoker   Substance and Sexual Activity   • Alcohol use: Yes     Comment: occ        Review of Systems     Objective   Vital Signs:   Pulse 111   Ht 160 cm (63\")   Wt 77.1 kg (170 lb)   SpO2 95%   BMI 30.11 kg/m²       Physical Exam  Constitutional:       Appearance: Normal appearance. Patient is well-developed and normal weight.   HENT:      Head: Normocephalic.      Right Ear: Hearing and external ear normal.      Left Ear: Hearing and external ear normal.      Nose: Nose normal.   Eyes:      Conjunctiva/sclera: Conjunctivae normal.   Cardiovascular:      Rate and Rhythm: Normal rate.   Pulmonary:      Effort: Pulmonary effort is normal.      Breath sounds: No wheezing or rales.   Abdominal:      Palpations: Abdomen is soft.      Tenderness: There is no abdominal tenderness.   Musculoskeletal:      Cervical back: Normal range of motion.   Skin:     Findings: No rash.   Neurological:      Mental Status: Patient  is alert and oriented to person, place, and time.   Psychiatric:         Mood " and Affect: Mood and affect normal.         Judgment: Judgment normal.       Ortho Exam      LEFT SHOULDER: Good tone of deltoid, biceps, triceps, wrist extensors, and wrist flexors.  Forward elevation to 140 degrees with pain. Abduction to 95 degrees. No swelling. Tender ac joint.       Large Joint Arthrocentesis  Date/Time: 2/2/2022 2:47 PM  Consent given by: patient  Site marked: site marked  Timeout: Immediately prior to procedure a time out was called to verify the correct patient, procedure, equipment, support staff and site/side marked as required   Supporting Documentation  Indications: pain   Procedure Details  Location: shoulder (left) -   Needle size: 22 G  Medications administered: 5 mL bupivacaine 0.25 %; 40 mg triamcinolone acetonide 40 MG/ML  Patient tolerance: patient tolerated the procedure well with no immediate complications              Imaging Results (Most Recent)     None           Result Review :         LEFT SHOULDER X-RAY  1/3/22  IMPRESSION: 1. Mild degenerative changes of the left shoulder  2. Minimal central pulmonary vascular congestion  3. No acute osseous abnormality     Assessment and Plan     DX: Left shoulder pain     Discussed treatment plans and diagnosis with the patient. She was given a left shoulder injection and tolerated this well. Home exercises given. She will obtain an MRI next visit if failing to improve.     Call or return if worsening symptoms.    Follow Up     4-5 weeks.       Patient was given instructions and counseling regarding her condition or for health maintenance advice. Please see specific information pulled into the AVS if appropriate.     Scribed for Stevo Zeng MD by Jennifer Last.  02/02/22   14:32 EST    I have personally performed the services described in this document as scribed by the above individual and it is both accurate and complete. Stevo Zeng MD 02/02/22

## 2022-03-02 ENCOUNTER — OFFICE VISIT (OUTPATIENT)
Dept: ORTHOPEDIC SURGERY | Facility: CLINIC | Age: 67
End: 2022-03-02

## 2022-03-02 VITALS — HEIGHT: 63 IN | WEIGHT: 170 LBS | BODY MASS INDEX: 30.12 KG/M2

## 2022-03-02 DIAGNOSIS — M25.512 LEFT SHOULDER PAIN, UNSPECIFIED CHRONICITY: Primary | ICD-10-CM

## 2022-03-02 DIAGNOSIS — M25.512 LEFT SHOULDER PAIN, UNSPECIFIED CHRONICITY: ICD-10-CM

## 2022-03-02 PROCEDURE — 99213 OFFICE O/P EST LOW 20 MIN: CPT | Performed by: PHYSICIAN ASSISTANT

## 2022-03-02 RX ORDER — TRAMADOL HYDROCHLORIDE 50 MG/1
50 TABLET ORAL EVERY 6 HOURS PRN
Qty: 30 TABLET | Refills: 0 | Status: SHIPPED | OUTPATIENT
Start: 2022-03-02 | End: 2022-03-29

## 2022-03-02 NOTE — PROGRESS NOTES
"Chief Complaint  Pain of the Left Shoulder    Subjective          Jada Butler presents to [facility] for follow-up of left shoulder pain.  Patient was last evaluated in clinic on 02/02/2022, which time she received left shoulder steroid injection of her shoulder.  Patient began having shoulder pain severely while on her trip in [place] at the beginning of the year.  She has had on and off shoulder pain of her left shoulder for several years.  Patient states that following the steroid injection, the pain worsened the week after.  She states pain today is of little better, however she reports still having pain of her shoulder.  She has been using tramadol as needed for pain, which she states helps some.    Objective   Allergies   Allergen Reactions   • Amoxicillin-Pot Clavulanate GI Intolerance   • Meloxicam Hives       Vital Signs:   Ht 160 cm (63\")   Wt 77.1 kg (170 lb)   BMI 30.11 kg/m²       Physical Exam  Constitutional:       Appearance: Normal appearance. Patient is well-developed and normal weight.   HENT:      Head: Normocephalic.      Right Ear: Hearing and external ear normal.      Left Ear: Hearing and external ear normal.      Nose: Nose normal.   Eyes:      Conjunctiva/sclera: Conjunctivae normal.   Cardiovascular:      Rate and Rhythm: Normal rate.   Pulmonary:      Effort: Pulmonary effort is normal.      Breath sounds: No wheezing or rales.   Abdominal:      Palpations: Abdomen is soft.      Tenderness: There is no abdominal tenderness.   Musculoskeletal:      Cervical back: Normal range of motion.   Skin:     Findings: No rash.   Neurological:      Mental Status: Patient is alert and oriented to person, place, and time.   Psychiatric:         Mood and Affect: Mood and affect normal.         Judgment: Judgment normal.     Ortho Exam  Left shoulder: Tenderness about the upper arm.  Tender AC joint.  No swelling or atrophy.  Limited active forward flexion of the " shoulder, passive forward flexion to 120 degrees.  Positive crossover test.  Pain with empty can testing.  Good muscle tone the deltoid, biceps, triceps, wrist flexors and extensors. Sensation intact. Neurovascular intact. Radial and ulnar pulse are 2+.      Result Review :            Imaging Results (Most Recent)     None                Assessment and Plan    Problem List Items Addressed This Visit        Musculoskeletal and Injuries    Left shoulder pain - Primary    Relevant Orders    MRI Shoulder Left Without Contrast          Follow Up   Return if symptoms worsen or fail to improve, for Recheck.  Patient Instructions   Patient given order today for MRI of the shoulder.     Follow up with these results.     Patient was given instructions and counseling regarding her condition or for health maintenance advice. Please see specific information pulled into the AVS if appropriate.

## 2022-03-14 ENCOUNTER — HOSPITAL ENCOUNTER (OUTPATIENT)
Dept: MRI IMAGING | Facility: HOSPITAL | Age: 67
Discharge: HOME | End: 2022-03-14
Admitting: PHYSICIAN ASSISTANT

## 2022-03-14 DIAGNOSIS — M25.512 LEFT SHOULDER PAIN, UNSPECIFIED CHRONICITY: ICD-10-CM

## 2022-03-14 PROCEDURE — 73221 MRI JOINT UPR EXTREM W/O DYE: CPT

## 2022-03-21 ENCOUNTER — PREP FOR SURGERY (OUTPATIENT)
Dept: OTHER | Facility: HOSPITAL | Age: 67
End: 2022-03-21

## 2022-03-21 ENCOUNTER — OFFICE VISIT (OUTPATIENT)
Dept: ORTHOPEDIC SURGERY | Facility: CLINIC | Age: 67
End: 2022-03-21

## 2022-03-21 VITALS — HEIGHT: 63 IN | WEIGHT: 170 LBS | BODY MASS INDEX: 30.12 KG/M2

## 2022-03-21 DIAGNOSIS — M75.52 BURSITIS OF LEFT SHOULDER: ICD-10-CM

## 2022-03-21 DIAGNOSIS — M75.42 IMPINGEMENT SYNDROME OF LEFT SHOULDER: Primary | ICD-10-CM

## 2022-03-21 PROCEDURE — 99213 OFFICE O/P EST LOW 20 MIN: CPT | Performed by: ORTHOPAEDIC SURGERY

## 2022-03-21 RX ORDER — CEFAZOLIN SODIUM IN 0.9 % NACL 3 G/100 ML
3 INTRAVENOUS SOLUTION, PIGGYBACK (ML) INTRAVENOUS ONCE
Status: CANCELLED | OUTPATIENT
Start: 2022-03-21 | End: 2022-03-21

## 2022-03-21 RX ORDER — CEFAZOLIN SODIUM 2 G/100ML
2 INJECTION, SOLUTION INTRAVENOUS ONCE
Status: CANCELLED | OUTPATIENT
Start: 2022-03-21 | End: 2022-03-21

## 2022-03-21 NOTE — PROGRESS NOTES
"Chief Complaint  Follow-up of the Left Shoulder     Subjective      Jada Butler presents to [facility] for a follow-up of left shoulder. Patient has been having pain in the shoulder that has been ongoing several years and progressively worsened. She has tried steroid injections in the past, therapy and medication. Patient present today with MRI results of the left shoulder.     Allergies   Allergen Reactions   • Amoxicillin-Pot Clavulanate GI Intolerance   • Meloxicam Hives        Social History     Socioeconomic History   • Marital status:    Tobacco Use   • Smoking status: Former Smoker   • Smokeless tobacco: Never Used   Substance and Sexual Activity   • Alcohol use: Yes     Comment: occ        Review of Systems     Objective   Vital Signs:   Ht 160 cm (63\")   Wt 77.1 kg (170 lb)   BMI 30.11 kg/m²       Physical Exam  Constitutional:       Appearance: Normal appearance. Patient is well-developed and normal weight.   HENT:      Head: Normocephalic.      Right Ear: Hearing and external ear normal.      Left Ear: Hearing and external ear normal.      Nose: Nose normal.   Eyes:      Conjunctiva/sclera: Conjunctivae normal.   Cardiovascular:      Rate and Rhythm: Normal rate.   Pulmonary:      Effort: Pulmonary effort is normal.      Breath sounds: No wheezing or rales.   Abdominal:      Palpations: Abdomen is soft.      Tenderness: There is no abdominal tenderness.   Musculoskeletal:      Cervical back: Normal range of motion.   Skin:     Findings: No rash.   Neurological:      Mental Status: Patient  is alert and oriented to person, place, and time.   Psychiatric:         Mood and Affect: Mood and affect normal.         Judgment: Judgment normal.       Ortho Exam      LEFT SHOULDER: Good tone of deltoid, biceps, triceps, wrist extensors, and wrist flexors.  Sensation grossly intact. Neurovascular intact.  Radial pulse 2+, ulnar pulse 2+. No swelling, skin discoloration or " atrophy. Full elbow flexion and extension. Skin intact. Positive impingement. Full forward elevation. Abduction to 100 degrees. Pain with internal rotation.       Procedures        Imaging Results (Most Recent)     None           Result Review :{Labs  Result Review  Imaging  Med Tab  Media  Procedures :23}         MRI Shoulder Left Without Contrast    Result Date: 3/15/2022  Narrative: PROCEDURE: MRI SHOULDER LEFT WO CONTRAST  COMPARISON:  [facility] HARRIET SCAPULA LT, 5/26/2021, 15:20.  INDICATIONS: LEFT SHOULDER PAIN. PAINFUL RANGE OF MOTION. NO KNOWN INJURY.      TECHNIQUE: A variety of imaging planes and parameters were utilized for visualization of suspected pathology.  Images were performed without contrast.   FINDINGS:  The examination is moderately limited by patient motion artifact.  No fracture or malalignment is identified.  Marrow signal appears normal.  Mild acromioclavicular osteoarthritis is noted.  Mild supraspinatus and infra spinatus tendinopathy is noted.  The rotator cuff otherwise appears unremarkable.  The biceps long head tendon and its attachment to the superior labrum are intact.  No overt labral tear is identified.  Cartilage in the joint appears grossly intact.  No significant joint or loose body is seen.      Impression:   1. Mild acromioclavicular osteoarthritis 2. Mild supraspinatus and infra spinatus tendinopathy 3. Moderately limited study secondary to patient motion artifact      Matthew Ortiz M.D.       Electronically Signed and Approved By: Matthew Ortiz M.D. on 3/15/2022 at 16:05                      Assessment and Plan     DX: Left shoulder bursitis  Left shoulder impingement     Discussed treatment plans and diagnosis with the patient. Discussed left shoulder arthroscopy vs conservative management. Patient has tried therapy, medication and injections with little relief. She wishes to proceed with a left shoulder scope.     Discussed surgery., Risks/benefits discussed  with patient including, but not limited to: infection, bleeding, neurovascular damage, re-rupture, aesthetic deformity, need for further surgery, and death. and Surgery pamphlet given.    Follow Up     Post-operatively.       Patient was given instructions and counseling regarding her condition or for health maintenance advice. Please see specific information pulled into the AVS if appropriate.     Scribed for Stevo Zeng MD by Jennifer Last.  03/21/22   14:26 EDT    I have personally performed the services described in this document as scribed by the above individual and it is both accurate and complete. Stevo Zeng MD 03/22/22

## 2022-03-29 RX ORDER — CETIRIZINE HYDROCHLORIDE 10 MG/1
10 TABLET ORAL NIGHTLY
COMMUNITY

## 2022-03-29 RX ORDER — PRAVASTATIN SODIUM 40 MG
40 TABLET ORAL NIGHTLY
COMMUNITY

## 2022-03-29 RX ORDER — CHOLESTYRAMINE LIGHT 4 G/5.7G
4 POWDER, FOR SUSPENSION ORAL DAILY
COMMUNITY

## 2022-03-29 RX ORDER — OMEPRAZOLE 20 MG/1
20 CAPSULE, DELAYED RELEASE ORAL NIGHTLY
COMMUNITY

## 2022-04-05 ENCOUNTER — ANESTHESIA EVENT (OUTPATIENT)
Dept: PERIOP | Facility: HOSPITAL | Age: 67
End: 2022-04-05

## 2022-04-28 ENCOUNTER — ANESTHESIA (OUTPATIENT)
Dept: PERIOP | Facility: HOSPITAL | Age: 67
End: 2022-04-28

## 2022-04-28 ENCOUNTER — HOSPITAL ENCOUNTER (OUTPATIENT)
Facility: HOSPITAL | Age: 67
Discharge: HOME | End: 2022-04-28
Attending: ORTHOPAEDIC SURGERY | Admitting: ORTHOPAEDIC SURGERY

## 2022-04-28 VITALS
WEIGHT: 173.28 LBS | OXYGEN SATURATION: 91 % | SYSTOLIC BLOOD PRESSURE: 139 MMHG | HEIGHT: 63 IN | DIASTOLIC BLOOD PRESSURE: 87 MMHG | HEART RATE: 90 BPM | BODY MASS INDEX: 30.7 KG/M2 | TEMPERATURE: 97 F | RESPIRATION RATE: 12 BRPM

## 2022-04-28 DIAGNOSIS — M75.42 IMPINGEMENT SYNDROME OF LEFT SHOULDER: ICD-10-CM

## 2022-04-28 LAB
GLUCOSE BLDC GLUCOMTR-MCNC: 117 MG/DL (ref 70–99)
GLUCOSE BLDC GLUCOMTR-MCNC: 193 MG/DL (ref 70–99)

## 2022-04-28 PROCEDURE — 25010000002 CEFAZOLIN IN DEXTROSE 2-4 GM/100ML-% SOLUTION: Performed by: ORTHOPAEDIC SURGERY

## 2022-04-28 PROCEDURE — 0 MEPERIDINE PER 100 MG: Performed by: ANESTHESIOLOGY

## 2022-04-28 PROCEDURE — 93005 ELECTROCARDIOGRAM TRACING: CPT | Performed by: ORTHOPAEDIC SURGERY

## 2022-04-28 PROCEDURE — 82962 GLUCOSE BLOOD TEST: CPT

## 2022-04-28 PROCEDURE — 76942 ECHO GUIDE FOR BIOPSY: CPT | Performed by: ORTHOPAEDIC SURGERY

## 2022-04-28 PROCEDURE — 25010000002 PROPOFOL 10 MG/ML EMULSION: Performed by: NURSE ANESTHETIST, CERTIFIED REGISTERED

## 2022-04-28 PROCEDURE — 29824 SHO ARTHRS SRG DSTL CLAVICLC: CPT | Performed by: ORTHOPAEDIC SURGERY

## 2022-04-28 PROCEDURE — 25010000002 METOCLOPRAMIDE PER 10 MG: Performed by: ANESTHESIOLOGY

## 2022-04-28 PROCEDURE — 25010000002 EPINEPHRINE PER 0.1 MG: Performed by: ORTHOPAEDIC SURGERY

## 2022-04-28 PROCEDURE — 25010000002 MIDAZOLAM PER 1 MG: Performed by: ANESTHESIOLOGY

## 2022-04-28 PROCEDURE — 93010 ELECTROCARDIOGRAM REPORT: CPT | Performed by: INTERNAL MEDICINE

## 2022-04-28 PROCEDURE — 29826 SHO ARTHRS SRG DECOMPRESSION: CPT | Performed by: ORTHOPAEDIC SURGERY

## 2022-04-28 PROCEDURE — A9270 NON-COVERED ITEM OR SERVICE: HCPCS | Performed by: ANESTHESIOLOGY

## 2022-04-28 PROCEDURE — 63710000001 ACETAMINOPHEN 500 MG TABLET: Performed by: ANESTHESIOLOGY

## 2022-04-28 RX ORDER — HYDROCODONE BITARTRATE AND ACETAMINOPHEN 7.5; 325 MG/1; MG/1
1 TABLET ORAL EVERY 4 HOURS PRN
Qty: 45 TABLET | Refills: 0 | Status: SHIPPED | OUTPATIENT
Start: 2022-04-28 | End: 2022-05-05

## 2022-04-28 RX ORDER — SODIUM CHLORIDE, SODIUM LACTATE, POTASSIUM CHLORIDE, CALCIUM CHLORIDE 600; 310; 30; 20 MG/100ML; MG/100ML; MG/100ML; MG/100ML
9 INJECTION, SOLUTION INTRAVENOUS CONTINUOUS PRN
Status: DISCONTINUED | OUTPATIENT
Start: 2022-04-28 | End: 2022-04-28 | Stop reason: HOSPADM

## 2022-04-28 RX ORDER — HYDROCODONE BITARTRATE AND ACETAMINOPHEN 7.5; 325 MG/1; MG/1
1 TABLET ORAL EVERY 4 HOURS PRN
Qty: 45 TABLET | Refills: 0 | Status: SHIPPED | OUTPATIENT
Start: 2022-04-28 | End: 2022-05-27 | Stop reason: SDUPTHER

## 2022-04-28 RX ORDER — CEFAZOLIN SODIUM 2 G/100ML
2 INJECTION, SOLUTION INTRAVENOUS ONCE
Status: COMPLETED | OUTPATIENT
Start: 2022-04-28 | End: 2022-04-28

## 2022-04-28 RX ORDER — ROCURONIUM BROMIDE 10 MG/ML
INJECTION, SOLUTION INTRAVENOUS AS NEEDED
Status: DISCONTINUED | OUTPATIENT
Start: 2022-04-28 | End: 2022-04-28 | Stop reason: SURG

## 2022-04-28 RX ORDER — PROPOFOL 10 MG/ML
VIAL (ML) INTRAVENOUS AS NEEDED
Status: DISCONTINUED | OUTPATIENT
Start: 2022-04-28 | End: 2022-04-28 | Stop reason: SURG

## 2022-04-28 RX ORDER — BUPIVACAINE HYDROCHLORIDE AND EPINEPHRINE 5; 5 MG/ML; UG/ML
INJECTION, SOLUTION EPIDURAL; INTRACAUDAL; PERINEURAL
Status: COMPLETED | OUTPATIENT
Start: 2022-04-28 | End: 2022-04-28

## 2022-04-28 RX ORDER — LIDOCAINE HYDROCHLORIDE 20 MG/ML
INJECTION, SOLUTION EPIDURAL; INFILTRATION; INTRACAUDAL; PERINEURAL AS NEEDED
Status: DISCONTINUED | OUTPATIENT
Start: 2022-04-28 | End: 2022-04-28 | Stop reason: SURG

## 2022-04-28 RX ORDER — ACETAMINOPHEN 500 MG
1000 TABLET ORAL ONCE
Status: COMPLETED | OUTPATIENT
Start: 2022-04-28 | End: 2022-04-28

## 2022-04-28 RX ORDER — METOCLOPRAMIDE HYDROCHLORIDE 5 MG/ML
10 INJECTION INTRAMUSCULAR; INTRAVENOUS ONCE AS NEEDED
Status: COMPLETED | OUTPATIENT
Start: 2022-04-28 | End: 2022-04-28

## 2022-04-28 RX ORDER — PHENYLEPHRINE HCL IN 0.9% NACL 1 MG/10 ML
SYRINGE (ML) INTRAVENOUS AS NEEDED
Status: DISCONTINUED | OUTPATIENT
Start: 2022-04-28 | End: 2022-04-28 | Stop reason: SURG

## 2022-04-28 RX ORDER — MEPERIDINE HYDROCHLORIDE 25 MG/ML
25 INJECTION INTRAMUSCULAR; INTRAVENOUS; SUBCUTANEOUS ONCE
Status: COMPLETED | OUTPATIENT
Start: 2022-04-28 | End: 2022-04-28

## 2022-04-28 RX ORDER — FENTANYL CITRATE 50 UG/ML
INJECTION, SOLUTION INTRAMUSCULAR; INTRAVENOUS AS NEEDED
Status: DISCONTINUED | OUTPATIENT
Start: 2022-04-28 | End: 2022-04-28

## 2022-04-28 RX ORDER — MIDAZOLAM HYDROCHLORIDE 1 MG/ML
2 INJECTION INTRAMUSCULAR; INTRAVENOUS ONCE
Status: COMPLETED | OUTPATIENT
Start: 2022-04-28 | End: 2022-04-28

## 2022-04-28 RX ORDER — CEFAZOLIN SODIUM IN 0.9 % NACL 3 G/100 ML
3 INTRAVENOUS SOLUTION, PIGGYBACK (ML) INTRAVENOUS ONCE
Status: DISCONTINUED | OUTPATIENT
Start: 2022-04-28 | End: 2022-04-28

## 2022-04-28 RX ADMIN — SODIUM CHLORIDE, POTASSIUM CHLORIDE, SODIUM LACTATE AND CALCIUM CHLORIDE 9 ML/HR: 600; 310; 30; 20 INJECTION, SOLUTION INTRAVENOUS at 11:19

## 2022-04-28 RX ADMIN — CEFAZOLIN SODIUM 2 G: 2 INJECTION, SOLUTION INTRAVENOUS at 12:34

## 2022-04-28 RX ADMIN — MEPERIDINE HYDROCHLORIDE 25 MG: 25 INJECTION INTRAMUSCULAR; INTRAVENOUS; SUBCUTANEOUS at 11:30

## 2022-04-28 RX ADMIN — PROPOFOL 200 MG: 10 INJECTION, EMULSION INTRAVENOUS at 12:37

## 2022-04-28 RX ADMIN — BUPIVACAINE HYDROCHLORIDE AND EPINEPHRINE BITARTRATE 32 ML: 5; .005 INJECTION, SOLUTION EPIDURAL; INTRACAUDAL; PERINEURAL at 11:40

## 2022-04-28 RX ADMIN — SUGAMMADEX 200 MG: 100 INJECTION, SOLUTION INTRAVENOUS at 13:33

## 2022-04-28 RX ADMIN — Medication 100 MCG: at 12:45

## 2022-04-28 RX ADMIN — MIDAZOLAM HYDROCHLORIDE 2 MG: 1 INJECTION, SOLUTION INTRAMUSCULAR; INTRAVENOUS at 11:30

## 2022-04-28 RX ADMIN — ROCURONIUM BROMIDE 50 MG: 10 INJECTION INTRAVENOUS at 12:37

## 2022-04-28 RX ADMIN — METOCLOPRAMIDE HYDROCHLORIDE 10 MG: 5 INJECTION INTRAMUSCULAR; INTRAVENOUS at 11:46

## 2022-04-28 RX ADMIN — LIDOCAINE HYDROCHLORIDE 100 MG: 20 INJECTION, SOLUTION EPIDURAL; INFILTRATION; INTRACAUDAL; PERINEURAL at 12:37

## 2022-04-28 RX ADMIN — ACETAMINOPHEN 1000 MG: 500 TABLET ORAL at 11:19

## 2022-04-28 NOTE — ANESTHESIA PROCEDURE NOTES
Peripheral Block    Pre-sedation assessment completed: 4/28/2022 11:30 AM    Patient reassessed immediately prior to procedure    Patient location during procedure: pre-op  Start time: 4/28/2022 11:30 AM  Stop time: 4/28/2022 11:40 AM  Reason for block: at surgeon's request and post-op pain management  Performed by  Anesthesiologist: Jb Maurer MD  Preanesthetic Checklist  Completed: patient identified, IV checked, site marked, risks and benefits discussed, surgical consent, monitors and equipment checked, pre-op evaluation and timeout performed  Prep:  Pt Position: supine (HOB elevated)  Sterile barriers:cap, washed/disinfected hands, sterile barriers, gloves, mask, partial drape and alcohol skin prep  Prep: ChloraPrep  Patient monitoring: blood pressure monitoring, continuous pulse oximetry and EKG  Procedure    Sedation: yes  Performed under: local infiltration  Guidance:ultrasound guided and nerve stimulator    ULTRASOUND INTERPRETATION.  Using ultrasound guidance a 22 G gauge needle was placed in close proximity to the brachial plexus nerve, at which point, under ultrasound guidance anesthetic was injected in the area of the nerve and spread of the anesthesia was seen on ultrasound in close proximity thereto.  There were no abnormalities seen on ultrasound; a digital image was taken; and the patient tolerated the procedure with no complications. Images:still images obtained, printed/placed on chart  Loss of twitch: 0.5 mA  Laterality:left  Block Type:interscalene  Injection Technique:single-shot  Needle Type:echogenic  Needle Gauge:22 G (2in)  Resistance on Injection: none    Medications Used: bupivacaine-EPINEPHrine PF (MARCAINE w/EPI) 0.5% -[id number] injection, 32 mL  Med administered at 4/28/2022 11:40 AM      Medications  Comment:precedex 50mcg added to above solution    8cc of above solution injected into musculotaneous nerve after twitch determined    Post Assessment  Injection Assessment: negative  aspiration for heme, no paresthesia on injection and incremental injection  Patient Tolerance:comfortable throughout block  Complications:no  Additional Notes  The block or continuous infusion is requested by the referring physician for management of postoperative pain, or pain related to a procedure. Ultrasound guidance (deemed medically necessary). Painless injection, pt was awake and conversant during the procedure without complications. Needle and surrounding structures visualized throughout procedure. No adverse reactions or complications seen during this period. Post-procedure image showed no signs of complication, and anatomy was consistent with an uncomplicated nerve blockade.

## 2022-04-28 NOTE — ANESTHESIA POSTPROCEDURE EVALUATION
Patient: Jada Butler    Procedure Summary     Date: 04/28/22 Room / Location: [facility] OR  / [facility]    Anesthesia Start: 1234 Anesthesia Stop: 1342    Procedure: LEFT SHOULDER ARTHROSCOPY , SUBACROMIAL DECOMPRESSION, DISTAL CLAVICULECTOMY (Left Shoulder) Diagnosis:       Impingement syndrome of left shoulder      (Impingement syndrome of left shoulder [M75.42])    Surgeons: Stevo Zeng MD Provider: Jb Maurer MD    Anesthesia Type: general with block, general ASA Status: 2          Anesthesia Type: general with block, general    Vitals  Vitals Value Taken Time   /90 04/28/22 1403   Temp     Pulse 86 04/28/22 1404   Resp     SpO2 93 % 04/28/22 1404   Vitals shown include unvalidated device data.        Post Anesthesia Care and Evaluation    Patient location during evaluation: bedside  Patient participation: complete - patient participated  Level of consciousness: awake, responsive to verbal stimuli, responsive to light touch, responsive to noxious stimuli, responsive to painful stimuli and responsive to physical stimuli  Pain score: 0  Pain management: adequate  Airway patency: patent  Anesthetic complications: No anesthetic complications  PONV Status: none  Cardiovascular status: acceptable and stable  Respiratory status: acceptable and nasal cannula  Hydration status: acceptable    Comments: An Anesthesiologist personally participated in the most demanding procedures (including induction and emergence if applicable) in the anesthesia plan, monitored the course of anesthesia administration at frequent intervals and remained physically present and available for immediate diagnosis and treatment of emergencies.

## 2022-04-28 NOTE — ANESTHESIA PREPROCEDURE EVALUATION
Anesthesia Evaluation     Patient summary reviewed and Nursing notes reviewed   history of anesthetic complications: PONV  NPO Solid Status: > 8 hours  NPO Liquid Status: > 2 hours           Airway   Mallampati: II  TM distance: >3 FB  Neck ROM: full  No difficulty expected  Dental      Pulmonary - negative pulmonary ROS and normal exam    breath sounds clear to auscultation  Cardiovascular - normal exam  Exercise tolerance: good (4-7 METS)    Rhythm: regular  Rate: normal    (+) hypertension, hyperlipidemia,       Neuro/Psych- negative ROS  GI/Hepatic/Renal/Endo    (+)   liver disease, diabetes mellitus type 2,     Musculoskeletal     Abdominal    Substance History - negative use     OB/GYN negative ob/gyn ROS         Other   arthritis,                      Anesthesia Plan    ASA 2     general with block and general   (Patient understands anesthesia not responsible for dental damage.    Avoid sux, pt had myalgias cramps post op last time)  intravenous induction     Anesthetic plan, all risks, benefits, and alternatives have been provided, discussed and informed consent has been obtained with: patient.  Use of blood products discussed with patient .   Plan discussed with CRNA.        CODE STATUS:

## 2022-05-01 LAB — QT INTERVAL: 396 MS

## 2022-05-05 DIAGNOSIS — M75.42 IMPINGEMENT SYNDROME OF LEFT SHOULDER: ICD-10-CM

## 2022-05-05 RX ORDER — HYDROCODONE BITARTRATE AND ACETAMINOPHEN 7.5; 325 MG/1; MG/1
TABLET ORAL
Qty: 42 TABLET | Refills: 0 | Status: SHIPPED | OUTPATIENT
Start: 2022-05-05 | End: 2022-05-13

## 2022-05-11 ENCOUNTER — OFFICE VISIT (OUTPATIENT)
Dept: ORTHOPEDIC SURGERY | Facility: CLINIC | Age: 67
End: 2022-05-11

## 2022-05-11 VITALS — HEIGHT: 63 IN | HEART RATE: 118 BPM | BODY MASS INDEX: 31.04 KG/M2 | OXYGEN SATURATION: 96 % | WEIGHT: 175.2 LBS

## 2022-05-11 DIAGNOSIS — Z47.89 AFTERCARE FOLLOWING SURGERY OF THE MUSCULOSKELETAL SYSTEM: Primary | ICD-10-CM

## 2022-05-11 PROCEDURE — 99024 POSTOP FOLLOW-UP VISIT: CPT | Performed by: PHYSICIAN ASSISTANT

## 2022-05-11 NOTE — PATIENT INSTRUCTIONS
Continue with PT to progress range of motion and strength. Recommend icing the shoulder for pain and swelling. Avoid overhead heavy lifting, pushing and pulling.     Follow up in 4 to 6 weeks.

## 2022-05-11 NOTE — PROGRESS NOTES
"Chief Complaint  Follow-up of the Left Shoulder and Suture / Staple Removal    Subjective          Jada Butler presents to [facility] for s/p left shoulder arthroscopy with TOMMY and MARGRET performed on 04/28/2022 by Dr. Zeng.  Patient continues to attend physical therapy at [facility] 3 times weekly.  She states she does have some shoulder soreness but overall is doing okay.  She denies fever, chills, numbness or tingling today.  She has been doing a lot of crab walks at home as well walking her hand up the wall to help improve shoulder motion.  She has no other complaints today.    Objective   Allergies   Allergen Reactions   • Amoxicillin-Pot Clavulanate GI Intolerance   • Meloxicam Unknown - High Severity     mobic-body aches       Vital Signs:   Pulse 118   Ht 160 cm (63\")   Wt 79.5 kg (175 lb 3.2 oz)   SpO2 96%   BMI 31.04 kg/m²       Physical Exam  Constitutional:       Appearance: Normal appearance. Patient is well-developed and normal weight.   HENT:      Head: Normocephalic.      Right Ear: Hearing and external ear normal.      Left Ear: Hearing and external ear normal.      Nose: Nose normal.   Eyes:      Conjunctiva/sclera: Conjunctivae normal.   Cardiovascular:      Rate and Rhythm: Normal rate.   Pulmonary:      Effort: Pulmonary effort is normal.      Breath sounds: No wheezing or rales.   Abdominal:      Palpations: Abdomen is soft.      Tenderness: There is no abdominal tenderness.   Musculoskeletal:      Cervical back: Normal range of motion.   Skin:     Findings: No rash.   Neurological:      Mental Status: Patient is alert and oriented to person, place, and time.   Psychiatric:         Mood and Affect: Mood and affect normal.         Judgment: Judgment normal.     Ortho Exam  Left shoulder: Surgical incisions are well-healing, sutures removed, no signs of infection.  No atrophy, discoloration or swelling.  Tenderness about the superior shoulder.  Active " forward flexion 155 degrees.  Active abduction 90 degrees.  Full elbow and wrist flexion and extension, full pronation and supination.  Sensation is intact.  Neurovascular intact distally.    Result Review :   The following data was reviewed by: ALMA Almonte on 05/11/2022:         Imaging Results (Most Recent)     None                Assessment and Plan    Problem List Items Addressed This Visit        Musculoskeletal and Injuries    Aftercare following shoulder arthroscopy, left, SAD and DC  - Primary          Follow Up   Return in about 4 weeks (around 6/8/2022).  Patient Instructions   Continue with PT to progress range of motion and strength. Recommend icing the shoulder for pain and swelling. Avoid overhead heavy lifting, pushing and pulling.     Follow up in 4 to 6 weeks.     Patient was given instructions and counseling regarding her condition or for health maintenance advice. Please see specific information pulled into the AVS if appropriate.

## 2022-05-13 ENCOUNTER — TELEPHONE (OUTPATIENT)
Dept: ORTHOPEDIC SURGERY | Facility: CLINIC | Age: 67
End: 2022-05-13

## 2022-05-13 DIAGNOSIS — M75.42 IMPINGEMENT SYNDROME OF LEFT SHOULDER: ICD-10-CM

## 2022-05-13 RX ORDER — HYDROCODONE BITARTRATE AND ACETAMINOPHEN 7.5; 325 MG/1; MG/1
TABLET ORAL
Qty: 42 TABLET | Refills: 0 | Status: SHIPPED | OUTPATIENT
Start: 2022-05-13 | End: 2022-05-20

## 2022-05-13 NOTE — TELEPHONE ENCOUNTER
Caller: SHAHNAZ    Relationship to patient:     Best call back number: [phone]    Patient is needing: SHAHNAZ IS CALLING TO CHECK ON REFILL FOR WIFE. SHE WENT TO PHARMACY TODAY TO  PAIN MEDS AND NOTHING IS THERE.   HYDROcodone-acetaminophen (NORCO) 7.5-325 MG per tablet [30719]     PATIENT DOES NOT HAVE ENOUGH TO GET THROUGH THE WEEKEND. HE DOESN'T KNOW HOW MANY SHE HAS BUT SHE ASKED HIM TO HANDLE THIS WHILE SHE IS AT PHYSICAL THERAPY

## 2022-05-19 DIAGNOSIS — M75.42 IMPINGEMENT SYNDROME OF LEFT SHOULDER: ICD-10-CM

## 2022-05-20 RX ORDER — HYDROCODONE BITARTRATE AND ACETAMINOPHEN 7.5; 325 MG/1; MG/1
TABLET ORAL
Qty: 42 TABLET | Refills: 0 | Status: SHIPPED | OUTPATIENT
Start: 2022-05-20 | End: 2022-05-27

## 2022-05-27 ENCOUNTER — TELEPHONE (OUTPATIENT)
Dept: ORTHOPEDIC SURGERY | Facility: CLINIC | Age: 67
End: 2022-05-27

## 2022-05-27 DIAGNOSIS — M75.42 IMPINGEMENT SYNDROME OF LEFT SHOULDER: ICD-10-CM

## 2022-05-27 RX ORDER — HYDROCODONE BITARTRATE AND ACETAMINOPHEN 7.5; 325 MG/1; MG/1
1 TABLET ORAL EVERY 6 HOURS PRN
Qty: 28 TABLET | Refills: 0 | Status: SHIPPED | OUTPATIENT
Start: 2022-05-27 | End: 2022-06-03

## 2022-05-27 NOTE — TELEPHONE ENCOUNTER
Caller: PATIENT     Relationship to patient: SELF    Best call back number: [phone]    Patient is needing: PATIENT CALLED HER PHARMACY REQUESTING A REFILL FOR HER HYDROCODNE 7.5 AND WANTS TO MAKE SURE IT GETS REFILLED BEFORE THE WEEKEND. [facility] [phone]. SHE HAS LESS THAN A 3 DAY SUPPLY. ATTEMPTED TO WT BUT THERE WAS NO ANSWER.

## 2022-05-31 ENCOUNTER — TELEPHONE (OUTPATIENT)
Dept: ORTHOPEDIC SURGERY | Facility: CLINIC | Age: 67
End: 2022-05-31

## 2022-06-02 DIAGNOSIS — M75.42 IMPINGEMENT SYNDROME OF LEFT SHOULDER: ICD-10-CM

## 2022-06-03 RX ORDER — HYDROCODONE BITARTRATE AND ACETAMINOPHEN 7.5; 325 MG/1; MG/1
1 TABLET ORAL EVERY 6 HOURS PRN
Qty: 28 TABLET | Refills: 0 | Status: SHIPPED | OUTPATIENT
Start: 2022-06-03 | End: 2022-06-09

## 2022-06-09 DIAGNOSIS — M75.42 IMPINGEMENT SYNDROME OF LEFT SHOULDER: ICD-10-CM

## 2022-06-09 RX ORDER — HYDROCODONE BITARTRATE AND ACETAMINOPHEN 7.5; 325 MG/1; MG/1
TABLET ORAL
Qty: 28 TABLET | Refills: 0 | Status: SHIPPED | OUTPATIENT
Start: 2022-06-09 | End: 2023-02-22

## 2022-06-15 ENCOUNTER — OFFICE VISIT (OUTPATIENT)
Dept: ORTHOPEDIC SURGERY | Facility: CLINIC | Age: 67
End: 2022-06-15

## 2022-06-15 VITALS — HEIGHT: 63 IN | OXYGEN SATURATION: 97 % | WEIGHT: 173.6 LBS | HEART RATE: 102 BPM | BODY MASS INDEX: 30.76 KG/M2

## 2022-06-15 DIAGNOSIS — Z47.89 AFTERCARE FOLLOWING SURGERY OF THE MUSCULOSKELETAL SYSTEM: Primary | ICD-10-CM

## 2022-06-15 PROCEDURE — 99024 POSTOP FOLLOW-UP VISIT: CPT | Performed by: PHYSICIAN ASSISTANT

## 2022-06-15 RX ORDER — TRAMADOL HYDROCHLORIDE 50 MG/1
50 TABLET ORAL EVERY 4 HOURS PRN
Qty: 30 TABLET | Refills: 0 | Status: SHIPPED | OUTPATIENT
Start: 2022-06-15 | End: 2022-06-24

## 2022-06-15 RX ORDER — CHOLESTYRAMINE 4 G/9G
POWDER, FOR SUSPENSION ORAL
COMMUNITY
Start: 2022-06-02

## 2022-06-15 NOTE — PROGRESS NOTES
"Chief Complaint  Follow-up of the Left Shoulder    Subjective          Jada Butler presents to [facility] for s/p left shoulder arthroscopy with subacromial decompression distal clavicle resection performed on 04/28/2020 Dr. Zeng.  Patient is currently attending physical therapy at [facility] 2-3 times a week.  She states pain is improving.  Her therapist states range of motion is progressing well.  She has been compliant with postop interventions.  She has no other additional complaints today.  Currently using prescription Norco for pain management.    Objective   Allergies   Allergen Reactions   • Amoxicillin-Pot Clavulanate GI Intolerance   • Meloxicam Unknown - High Severity     mobic-body aches       Vital Signs:   Pulse 102   Ht 160 cm (63\")   Wt 78.7 kg (173 lb 9.6 oz)   SpO2 97%   BMI 30.75 kg/m²       Physical Exam  Constitutional:       Appearance: Normal appearance. Patient is well-developed and normal weight.   HENT:      Head: Normocephalic.      Right Ear: Hearing and external ear normal.      Left Ear: Hearing and external ear normal.      Nose: Nose normal.   Eyes:      Conjunctiva/sclera: Conjunctivae normal.   Cardiovascular:      Rate and Rhythm: Normal rate.   Pulmonary:      Effort: Pulmonary effort is normal.      Breath sounds: No wheezing or rales.   Abdominal:      Palpations: Abdomen is soft.      Tenderness: There is no abdominal tenderness.   Musculoskeletal:      Cervical back: Normal range of motion.   Skin:     Findings: No rash.   Neurological:      Mental Status: Patient is alert and oriented to person, place, and time.   Psychiatric:         Mood and Affect: Mood and affect normal.         Judgment: Judgment normal.     Ortho Exam  Left shoulder: Well-healed surgical incisions, no swelling, skin dry and intact, nontender to palpate.  Active forward flexion to 155 degrees, active abduction 113 degrees.  External rotation passively to 55 " degrees.  Full active elbow and wrist flexion and extension, full pronation and supination.  Full motion of the digits.  Sensation and pulses are intact.  Neurovascular intact distally.    Result Review :            Imaging Results (Most Recent)     None                Assessment and Plan    Problem List Items Addressed This Visit        Musculoskeletal and Injuries    Aftercare following shoulder arthroscopy, left, SAD and DC  - Primary          Follow Up   Return in about 4 weeks (around 7/13/2022).  Patient Instructions   Continue with physical therapy to progress ROM and strength.     Avoid heavy lifting, overhead lifting, pushing or pulling.    Reevaluate in 4 to 6 weeks.     Patient was given instructions and counseling regarding her condition or for health maintenance advice. Please see specific information pulled into the AVS if appropriate.

## 2022-06-15 NOTE — PATIENT INSTRUCTIONS
Continue with physical therapy to progress ROM and strength.     Avoid heavy lifting, overhead lifting, pushing or pulling.    Reevaluate in 4 to 6 weeks.

## 2022-06-15 NOTE — TELEPHONE ENCOUNTER
Patient was seen today in the office for a follow up and is requesting a refill on pain medicine.

## 2022-06-23 DIAGNOSIS — Z47.89 AFTERCARE FOLLOWING SURGERY OF THE MUSCULOSKELETAL SYSTEM: ICD-10-CM

## 2022-06-24 RX ORDER — TRAMADOL HYDROCHLORIDE 50 MG/1
50 TABLET ORAL EVERY 8 HOURS PRN
Qty: 21 TABLET | Refills: 0 | Status: SHIPPED | OUTPATIENT
Start: 2022-06-24 | End: 2022-06-30

## 2022-06-30 DIAGNOSIS — Z47.89 AFTERCARE FOLLOWING SURGERY OF THE MUSCULOSKELETAL SYSTEM: ICD-10-CM

## 2022-06-30 RX ORDER — TRAMADOL HYDROCHLORIDE 50 MG/1
TABLET ORAL
Qty: 21 TABLET | Refills: 0 | Status: SHIPPED | OUTPATIENT
Start: 2022-06-30 | End: 2023-02-22 | Stop reason: ALTCHOICE

## 2022-07-20 ENCOUNTER — OFFICE VISIT (OUTPATIENT)
Dept: ORTHOPEDIC SURGERY | Facility: CLINIC | Age: 67
End: 2022-07-20

## 2022-07-20 VITALS — OXYGEN SATURATION: 97 % | HEIGHT: 63 IN | WEIGHT: 173 LBS | HEART RATE: 101 BPM | BODY MASS INDEX: 30.65 KG/M2

## 2022-07-20 DIAGNOSIS — Z47.89 AFTERCARE FOLLOWING SURGERY OF THE MUSCULOSKELETAL SYSTEM: Primary | ICD-10-CM

## 2022-07-20 PROCEDURE — 99024 POSTOP FOLLOW-UP VISIT: CPT | Performed by: PHYSICIAN ASSISTANT

## 2022-07-20 NOTE — PROGRESS NOTES
"Chief Complaint  Pain and Follow-up of the Left Shoulder    Subjective          Jada Butler presents to [facility] for s/p left shoulder arthroscopy with subacromial decompression, distal clavicle resection performed on 04/28/2022 by Dr. Zeng.  Patient is discharged from physical therapy as of 1 week.  She states she has no pain of the shoulder and is pleased with progress and outcome following her shoulder scope.  She has no other additional complaints today.    Objective   Allergies   Allergen Reactions   • Amoxicillin-Pot Clavulanate GI Intolerance   • Meloxicam Unknown - High Severity     mobic-body aches       Vital Signs:   Pulse 101   Ht 160 cm (63\")   Wt 78.5 kg (173 lb)   SpO2 97%   BMI 30.65 kg/m²       Physical Exam  Constitutional:       Appearance: Normal appearance. Patient is well-developed and normal weight.   HENT:      Head: Normocephalic.      Right Ear: Hearing and external ear normal.      Left Ear: Hearing and external ear normal.      Nose: Nose normal.   Eyes:      Conjunctiva/sclera: Conjunctivae normal.   Cardiovascular:      Rate and Rhythm: Normal rate.   Pulmonary:      Effort: Pulmonary effort is normal.      Breath sounds: No wheezing or rales.   Abdominal:      Palpations: Abdomen is soft.      Tenderness: There is no abdominal tenderness.   Musculoskeletal:      Cervical back: Normal range of motion.   Skin:     Findings: No rash.   Neurological:      Mental Status: Patient is alert and oriented to person, place, and time.   Psychiatric:         Mood and Affect: Mood and affect normal.         Judgment: Judgment normal.     Ortho Exam  Left shoulder: Skin dry and intact.  Well-healed surgical incisions.  No tenderness to palpation.  Active forward flexion to 170 degrees, active abduction 170 degrees, external rotation passively to 65 degrees.  Full active elbow and wrist flexion extension, full pronation supination.  Sensation and pulses are " intact.  Neurovascular intact distally.  Result Review :            Imaging Results (Most Recent)     None                Assessment and Plan    Problem List Items Addressed This Visit        Musculoskeletal and Injuries    Aftercare following shoulder arthroscopy, left, SAD and DC  - Primary          Follow Up   Return if symptoms worsen or fail to improve.  Patient Instructions   Patient would like to proceed with home exercise program only at this point. She feels she has completed PT. Will call if need to return to PT.    Follow up as needed.     Patient was given instructions and counseling regarding her condition or for health maintenance advice. Please see specific information pulled into the AVS if appropriate.

## 2022-07-20 NOTE — PATIENT INSTRUCTIONS
Patient would like to proceed with home exercise program only at this point. She feels she has completed PT. Will call if need to return to PT.    Follow up as needed.

## 2022-10-17 ENCOUNTER — OFFICE VISIT (OUTPATIENT)
Dept: ORTHOPEDIC SURGERY | Facility: CLINIC | Age: 67
End: 2022-10-17

## 2022-10-17 VITALS — HEIGHT: 63 IN | BODY MASS INDEX: 29.95 KG/M2 | WEIGHT: 169 LBS | OXYGEN SATURATION: 99 % | HEART RATE: 68 BPM

## 2022-10-17 DIAGNOSIS — G56.03 BILATERAL CARPAL TUNNEL SYNDROME: Primary | ICD-10-CM

## 2022-10-17 PROCEDURE — 99213 OFFICE O/P EST LOW 20 MIN: CPT | Performed by: ORTHOPAEDIC SURGERY

## 2022-10-17 NOTE — PROGRESS NOTES
"Chief Complaint  Initial Evaluation of the Left Hand and Initial Evaluation of the Right Hand     Subjective      Jada Butler presents to [facility] for B hands. Patient having pain more in left wrist but complaints in B hands. Patient having complaints for a couple years numbness to bilateral hands at times.       Allergies   Allergen Reactions   • Amoxicillin-Pot Clavulanate GI Intolerance   • Meloxicam Unknown - High Severity     mobic-body aches        Social History     Socioeconomic History   • Marital status:    Tobacco Use   • Smoking status: Former   • Smokeless tobacco: Never   Substance and Sexual Activity   • Alcohol use: Not Currently   • Drug use: Never        Review of Systems     Objective   Vital Signs:   Pulse 68   Ht 160 cm (63\")   Wt 76.7 kg (169 lb)   SpO2 99%   BMI 29.94 kg/m²       Physical Exam  Constitutional:       Appearance: Normal appearance. Patient is well-developed and normal weight.   HENT:      Head: Normocephalic.      Right Ear: Hearing and external ear normal.      Left Ear: Hearing and external ear normal.      Nose: Nose normal.   Eyes:      Conjunctiva/sclera: Conjunctivae normal.   Cardiovascular:      Rate and Rhythm: Normal rate.   Pulmonary:      Effort: Pulmonary effort is normal.      Breath sounds: No wheezing or rales.   Abdominal:      Palpations: Abdomen is soft.      Tenderness: There is no abdominal tenderness.   Musculoskeletal:      Cervical back: Normal range of motion.   Skin:     Findings: No rash.   Neurological:      Mental Status: Patient  is alert and oriented to person, place, and time.   Psychiatric:         Mood and Affect: Mood and affect normal.         Judgment: Judgment normal.       Ortho Exam     B HAND: Positive tinels.  Positive nerve compression test.  Dorsal pedal pulse 2+, posterior tibialis pulse 2+. Radial pulse 2+. Ulnar pulse 2+. Full wrist extension, full wrist flexion, full , full thumb " opposition.       Procedures        Imaging Results (Most Recent)     None           Result Review :              Assessment and Plan     Diagnoses and all orders for this visit:    1. Bilateral carpal tunnel syndrome (Primary)  -     EMG & Nerve Conduction Test; Future        Plan for EMG and home exercises.     Call or return if worsening symptoms.    Follow Up     After EMG      Patient was given instructions and counseling regarding her condition or for health maintenance advice. Please see specific information pulled into the AVS if appropriate.     Scribed for Stevo Zeng MD by Diane Goldsmith MA.  10/17/22   13:55 EDT      I have personally performed the services described in this document as scribed by the above individual and it is both accurate and complete. Stevo Zeng MD 10/17/22

## 2023-02-22 ENCOUNTER — PROCEDURE VISIT (OUTPATIENT)
Dept: NEUROLOGY | Facility: CLINIC | Age: 68
End: 2023-02-22
Payer: MEDICARE

## 2023-02-22 VITALS — WEIGHT: 169 LBS | BODY MASS INDEX: 29.94 KG/M2

## 2023-02-22 DIAGNOSIS — G56.03 BILATERAL CARPAL TUNNEL SYNDROME: ICD-10-CM

## 2023-02-22 PROCEDURE — 99202 OFFICE O/P NEW SF 15 MIN: CPT | Performed by: PSYCHIATRY & NEUROLOGY

## 2023-02-22 PROCEDURE — 95860 NEEDLE EMG 1 EXTREMITY: CPT | Performed by: PSYCHIATRY & NEUROLOGY

## 2023-02-22 PROCEDURE — 95910 NRV CNDJ TEST 7-8 STUDIES: CPT | Performed by: PSYCHIATRY & NEUROLOGY

## 2023-02-22 RX ORDER — GABAPENTIN 100 MG/1
CAPSULE ORAL
COMMUNITY
Start: 2023-01-29

## 2023-02-22 RX ORDER — VALACYCLOVIR HYDROCHLORIDE 500 MG/1
1 TABLET, FILM COATED ORAL EVERY 12 HOURS SCHEDULED
COMMUNITY
Start: 2022-11-23

## 2023-02-22 RX ORDER — TRAZODONE HYDROCHLORIDE 50 MG/1
50 TABLET ORAL
COMMUNITY
Start: 2023-01-23

## 2023-02-22 NOTE — ASSESSMENT & PLAN NOTE
Nerve conduction studies abnormal shows electrophysiologic evidence for moderate left carpal tunnel syndrome and mild left carpal tunnel syndrome.  She is to wear wrist splints at night until she follows up with orthopedic surgery.  She is to discuss regarding carpal tunnel surgery to the left hand with orthopedic surgery on her next clinic visit.

## 2023-02-22 NOTE — PROGRESS NOTES
Chief Complaint  Numbness (BUE L>R)    Subjective          Jada Butler is a 67 y.o. female who presents to [facility]  History of Present Illness  67-year-old woman evaluated for bilateral hand numbness and tingling.  It is worse in the left hand.  She states that she has had this numbness and tingling for years.  It wakes her up several times at night.  She has not been wearing a wrist brace.  She states that during the daytime it bothers her as well randomly.  She states that she has been working in the past being a  when she was living in [place].  She and her  who is from [place] moved to this area.  She is not working at this time.  Objective   Vital Signs:   Wt 76.7 kg (169 lb)   BMI 29.94 kg/m²     Physical Exam   Alert, fluent, phasic, follows commands well.  There is no weakness of intrinsic hand muscles.  Phalen sign is positive in the left hand.     Assessment and Plan  Diagnoses and all orders for this visit:    1. Bilateral carpal tunnel syndrome  Assessment & Plan:  Nerve conduction studies abnormal shows electrophysiologic evidence for moderate left carpal tunnel syndrome and mild left carpal tunnel syndrome.  She is to wear wrist splints at night until she follows up with orthopedic surgery.  She is to discuss regarding carpal tunnel surgery to the left hand with orthopedic surgery on her next clinic visit.    Orders:  -     EMG & Nerve Conduction Test       Nerve Conduction Study:  8 nerves     EMG:  Not done    Total time spent with the patient and coordinating patient care was 20 minutes.    Follow Up  No follow-ups on file.  Patient was given instructions and counseling regarding her condition or for health maintenance advice. Please see specific information pulled into the AVS if appropriate.

## 2023-03-01 ENCOUNTER — OFFICE VISIT (OUTPATIENT)
Dept: ORTHOPEDIC SURGERY | Facility: CLINIC | Age: 68
End: 2023-03-01
Payer: MEDICARE

## 2023-03-01 VITALS — BODY MASS INDEX: 29.95 KG/M2 | HEIGHT: 63 IN | WEIGHT: 169 LBS

## 2023-03-01 DIAGNOSIS — G56.03 BILATERAL CARPAL TUNNEL SYNDROME: Primary | ICD-10-CM

## 2023-03-01 PROCEDURE — 20526 THER INJECTION CARP TUNNEL: CPT | Performed by: ORTHOPAEDIC SURGERY

## 2023-03-01 PROCEDURE — 99214 OFFICE O/P EST MOD 30 MIN: CPT | Performed by: ORTHOPAEDIC SURGERY

## 2023-03-01 RX ADMIN — TRIAMCINOLONE ACETONIDE 40 MG: 40 INJECTION, SUSPENSION INTRA-ARTICULAR; INTRAMUSCULAR at 14:42

## 2023-03-01 RX ADMIN — LIDOCAINE HYDROCHLORIDE 1 ML: 10 INJECTION, SOLUTION EPIDURAL; INFILTRATION; INTRACAUDAL; PERINEURAL at 14:42

## 2023-03-01 NOTE — PROGRESS NOTES
"Chief Complaint  Follow-up of the Right Hand and Follow-up of the Left Hand     Subjective      Jada Butler presents to [facility] for follow up of bilateral hands. Patient having pain more in left wrist but complaints in B hands. Patient having complaints for a couple years numbness to bilateral hands at times. Her left hand is worse then the right.  She notes difficulty with gripping and FMC.     Allergies   Allergen Reactions   • Amoxicillin-Pot Clavulanate GI Intolerance   • Meloxicam Unknown - High Severity     mobic-body aches        Social History     Socioeconomic History   • Marital status:    Tobacco Use   • Smoking status: Former   • Smokeless tobacco: Never   Vaping Use   • Vaping Use: Never used   Substance and Sexual Activity   • Alcohol use: Not Currently   • Drug use: Never   • Sexual activity: Not Currently     Birth control/protection: Surgical, Hysterectomy        Review of Systems     Objective   Vital Signs:   Ht 160 cm (63\")   Wt 76.7 kg (169 lb)   BMI 29.94 kg/m²       Physical Exam  Constitutional:       Appearance: Normal appearance. Patient is well-developed and normal weight.   HENT:      Head: Normocephalic.      Right Ear: Hearing and external ear normal.      Left Ear: Hearing and external ear normal.      Nose: Nose normal.   Eyes:      Conjunctiva/sclera: Conjunctivae normal.   Cardiovascular:      Rate and Rhythm: Normal rate.   Pulmonary:      Effort: Pulmonary effort is normal.      Breath sounds: No wheezing or rales.   Abdominal:      Palpations: Abdomen is soft.      Tenderness: There is no abdominal tenderness.   Musculoskeletal:      Cervical back: Normal range of motion.   Skin:     Findings: No rash.   Neurological:      Mental Status: Patient  is alert and oriented to person, place, and time.   Psychiatric:         Mood and Affect: Mood and affect normal.         Judgment: Judgment normal.       Ortho Exam      BILATERAL HANDS " Negative Compression testing/ Positive Tinels. NegativeFinkelsteins. Negative Burr's testing. Negative CMC grind testing. Positive Phalens. Full ROM of the hand, fingers, elbow and wrist. Negative Triggering of the digit. Sensation grossly intact to light touch, median, radial and ulnar nerve. Positive AIN, PIN and ulnar nerve motor function intact. Axillary nerve intact. Positive pulses.         Medium Joint Arthrocentesis  Date/Time: 3/1/2023 2:42 PM  Procedure Details  Location: wrist -   Preparation: Patient was prepped and draped in the usual sterile fashion  Needle size: 23 G  Medications administered: 40 mg triamcinolone acetonide 40 MG/ML; 1 mL lidocaine PF 1% 1 %  Patient tolerance: patient tolerated the procedure well with no immediate complications              Imaging Results (Most Recent)     None           Result Review :             Assessment and Plan     Diagnoses and all orders for this visit:    1. Bilateral carpal tunnel syndrome (Primary)        Discussed the treatment plan with the patient. Discussed the treatment options with the patient, operative vs non-operative. The patient expressed understanding and wished to proceed with a left hand steroid injection.  She tolerated the injections well.     Call or return if worsening symptoms.    Follow Up     PRN      Patient was given instructions and counseling regarding her condition or for health maintenance advice. Please see specific information pulled into the AVS if appropriate.     Scribed for Stevo Zeng MD by Diane Prescott MA.  03/01/23   14:20 EST    I have personally performed the services described in this document as scribed by the above individual and it is both accurate and complete. Stevo Zeng MD 03/03/23

## 2023-03-03 RX ORDER — TRIAMCINOLONE ACETONIDE 40 MG/ML
40 INJECTION, SUSPENSION INTRA-ARTICULAR; INTRAMUSCULAR
Status: COMPLETED | OUTPATIENT
Start: 2023-03-01 | End: 2023-03-01

## 2023-03-03 RX ORDER — LIDOCAINE HYDROCHLORIDE 10 MG/ML
1 INJECTION, SOLUTION EPIDURAL; INFILTRATION; INTRACAUDAL; PERINEURAL
Status: COMPLETED | OUTPATIENT
Start: 2023-03-01 | End: 2023-03-01

## 2023-04-10 ENCOUNTER — PREP FOR SURGERY (OUTPATIENT)
Dept: OTHER | Facility: HOSPITAL | Age: 68
End: 2023-04-10
Payer: MEDICARE

## 2023-04-10 ENCOUNTER — TELEPHONE (OUTPATIENT)
Dept: ORTHOPEDIC SURGERY | Facility: CLINIC | Age: 68
End: 2023-04-10
Payer: MEDICARE

## 2023-04-10 DIAGNOSIS — G56.03 BILATERAL CARPAL TUNNEL SYNDROME: Primary | ICD-10-CM

## 2023-04-10 PROBLEM — G56.02 CARPAL TUNNEL SYNDROME OF LEFT WRIST: Status: ACTIVE | Noted: 2023-04-10

## 2023-04-10 RX ORDER — CEFAZOLIN SODIUM IN 0.9 % NACL 3 G/100 ML
3 INTRAVENOUS SOLUTION, PIGGYBACK (ML) INTRAVENOUS ONCE
OUTPATIENT
Start: 2023-04-10 | End: 2023-04-10

## 2023-04-10 RX ORDER — CEFAZOLIN SODIUM 2 G/100ML
2 INJECTION, SOLUTION INTRAVENOUS ONCE
OUTPATIENT
Start: 2023-04-10 | End: 2023-04-10

## 2023-04-19 NOTE — TELEPHONE ENCOUNTER
Caller: HUNTER   Relationship to Patient: SELF   Phone Number: [phone]  Reason for Call: PATIENT CALLING STATING SHE HAS DECIDED TO GO THRU WITH THE CARPAL TUNNEL SX UNSURE IF SHE NEEDS TO COME BACK IN PRIOR TO STATING SX PROTOCOL   
[facility] CONTACTED ME AND [facility] IS WANTING SPECIFIC DOCUMENTATION. SURGERY FOR 4/27 HAS BEEN RESCHEDULED TO 5/11 AND AN APPOINTMENT WAS MADE FOR THE PATIENT TO COME BACK IN TO THE OFFICE ON 5/1/2023 TO DISCUSS SURGERY.   ----------------------------------------------------------  [11:01 AM 4/19/2023] Aurea Ford ([facility])    JUANA Weathers [id number] - I am going to need additional information on this patient in order to submit to [facility]. The last note 03/01/2023 was an Arthrocentesis note. It states that Dr. Zeng discussed treatment options and patient wished to proceed with left hand steroid injection. However, we need documentation that the steroid injection did not work, and we need specific documentation as to what side Dr. Zeng is doing the Carpal Tunnel Release on. I am assuming it is the left. [facility] will want, conservation treatment tried and failed and the duration tried. They will also want detailed documentation of the surgery treatment plan and that it was discussed with the patient after the steroid injection.         
PATIENT CONTACTED AND LEFT CARPAL TUNNEL RELEASE SCHEDULED FOR 4/27/2023. PATIENT IS AWARE THAT THE HOSPITAL WILL CALL THE DAY BEFORE SURGERY WITH AN ARRIVAL TIME.   
fall

## 2023-04-29 ENCOUNTER — HOSPITAL ENCOUNTER (EMERGENCY)
Facility: HOSPITAL | Age: 68
Discharge: HOME | End: 2023-04-29
Attending: EMERGENCY MEDICINE
Payer: MEDICARE

## 2023-04-29 ENCOUNTER — APPOINTMENT (OUTPATIENT)
Dept: GENERAL RADIOLOGY | Facility: HOSPITAL | Age: 68
End: 2023-04-29
Payer: MEDICARE

## 2023-04-29 VITALS
TEMPERATURE: 98.5 F | OXYGEN SATURATION: 95 % | HEART RATE: 85 BPM | BODY MASS INDEX: 31.01 KG/M2 | DIASTOLIC BLOOD PRESSURE: 78 MMHG | SYSTOLIC BLOOD PRESSURE: 130 MMHG | RESPIRATION RATE: 16 BRPM | WEIGHT: 175 LBS | HEIGHT: 63 IN

## 2023-04-29 DIAGNOSIS — R07.89 CHEST DISCOMFORT: Primary | ICD-10-CM

## 2023-04-29 LAB
ALBUMIN SERPL-MCNC: 4.3 G/DL (ref 3.5–5.2)
ALBUMIN/GLOB SERPL: 1.4 G/DL
ALP SERPL-CCNC: 60 U/L (ref 39–117)
ALT SERPL W P-5'-P-CCNC: 22 U/L (ref 1–33)
ANION GAP SERPL CALCULATED.3IONS-SCNC: 14.1 MMOL/L (ref 5–15)
AST SERPL-CCNC: 30 U/L (ref 1–32)
BASOPHILS # BLD AUTO: 0.07 10*3/MM3 (ref 0–0.2)
BASOPHILS NFR BLD AUTO: 0.9 % (ref 0–1.5)
BILIRUB SERPL-MCNC: 0.7 MG/DL (ref 0–1.2)
BUN SERPL-MCNC: 18 MG/DL (ref 8–23)
BUN/CREAT SERPL: 25.7 (ref 7–25)
CALCIUM SPEC-SCNC: 9.1 MG/DL (ref 8.6–10.5)
CHLORIDE SERPL-SCNC: 99 MMOL/L (ref 98–107)
CO2 SERPL-SCNC: 24.9 MMOL/L (ref 22–29)
CREAT SERPL-MCNC: 0.7 MG/DL (ref 0.57–1)
DEPRECATED RDW RBC AUTO: 46.4 FL (ref 37–54)
EGFRCR SERPLBLD CKD-EPI 2021: 94.9 ML/MIN/1.73
EOSINOPHIL # BLD AUTO: 0.2 10*3/MM3 (ref 0–0.4)
EOSINOPHIL NFR BLD AUTO: 2.7 % (ref 0.3–6.2)
ERYTHROCYTE [DISTWIDTH] IN BLOOD BY AUTOMATED COUNT: 15.2 % (ref 12.3–15.4)
GEN 5 2HR TROPONIN T REFLEX: 17 NG/L
GLOBULIN UR ELPH-MCNC: 3 GM/DL
GLUCOSE SERPL-MCNC: 107 MG/DL (ref 65–99)
HCT VFR BLD AUTO: 35.4 % (ref 34–46.6)
HGB BLD-MCNC: 11.4 G/DL (ref 12–15.9)
HOLD SPECIMEN: NORMAL
HOLD SPECIMEN: NORMAL
IMM GRANULOCYTES # BLD AUTO: 0.03 10*3/MM3 (ref 0–0.05)
IMM GRANULOCYTES NFR BLD AUTO: 0.4 % (ref 0–0.5)
LIPASE SERPL-CCNC: 19 U/L (ref 13–60)
LYMPHOCYTES # BLD AUTO: 2.81 10*3/MM3 (ref 0.7–3.1)
LYMPHOCYTES NFR BLD AUTO: 37.7 % (ref 19.6–45.3)
MAGNESIUM SERPL-MCNC: 1.6 MG/DL (ref 1.6–2.4)
MCH RBC QN AUTO: 27 PG (ref 26.6–33)
MCHC RBC AUTO-ENTMCNC: 32.2 G/DL (ref 31.5–35.7)
MCV RBC AUTO: 83.7 FL (ref 79–97)
MONOCYTES # BLD AUTO: 0.61 10*3/MM3 (ref 0.1–0.9)
MONOCYTES NFR BLD AUTO: 8.2 % (ref 5–12)
NEUTROPHILS NFR BLD AUTO: 3.74 10*3/MM3 (ref 1.7–7)
NEUTROPHILS NFR BLD AUTO: 50.1 % (ref 42.7–76)
NRBC BLD AUTO-RTO: 0 /100 WBC (ref 0–0.2)
NT-PROBNP SERPL-MCNC: <36 PG/ML (ref 0–900)
PLATELET # BLD AUTO: 297 10*3/MM3 (ref 140–450)
PMV BLD AUTO: 9.4 FL (ref 6–12)
POTASSIUM SERPL-SCNC: 4.1 MMOL/L (ref 3.5–5.2)
PROT SERPL-MCNC: 7.3 G/DL (ref 6–8.5)
QT INTERVAL: 312 MS
QT INTERVAL: 375 MS
RBC # BLD AUTO: 4.23 10*6/MM3 (ref 3.77–5.28)
SODIUM SERPL-SCNC: 138 MMOL/L (ref 136–145)
TROPONIN T DELTA: -1 NG/L
TROPONIN T SERPL HS-MCNC: 18 NG/L
WBC NRBC COR # BLD: 7.46 10*3/MM3 (ref 3.4–10.8)
WHOLE BLOOD HOLD COAG: NORMAL
WHOLE BLOOD HOLD SPECIMEN: NORMAL

## 2023-04-29 PROCEDURE — 36415 COLL VENOUS BLD VENIPUNCTURE: CPT

## 2023-04-29 PROCEDURE — 84484 ASSAY OF TROPONIN QUANT: CPT | Performed by: EMERGENCY MEDICINE

## 2023-04-29 PROCEDURE — 71045 X-RAY EXAM CHEST 1 VIEW: CPT

## 2023-04-29 PROCEDURE — 83690 ASSAY OF LIPASE: CPT | Performed by: EMERGENCY MEDICINE

## 2023-04-29 PROCEDURE — 99284 EMERGENCY DEPT VISIT MOD MDM: CPT

## 2023-04-29 PROCEDURE — 80053 COMPREHEN METABOLIC PANEL: CPT | Performed by: EMERGENCY MEDICINE

## 2023-04-29 PROCEDURE — 83735 ASSAY OF MAGNESIUM: CPT | Performed by: EMERGENCY MEDICINE

## 2023-04-29 PROCEDURE — 83880 ASSAY OF NATRIURETIC PEPTIDE: CPT | Performed by: EMERGENCY MEDICINE

## 2023-04-29 PROCEDURE — 25010000002 ORPHENADRINE CITRATE PER 60 MG: Performed by: EMERGENCY MEDICINE

## 2023-04-29 PROCEDURE — 93005 ELECTROCARDIOGRAM TRACING: CPT

## 2023-04-29 PROCEDURE — 85025 COMPLETE CBC W/AUTO DIFF WBC: CPT

## 2023-04-29 PROCEDURE — 93005 ELECTROCARDIOGRAM TRACING: CPT | Performed by: EMERGENCY MEDICINE

## 2023-04-29 PROCEDURE — 96374 THER/PROPH/DIAG INJ IV PUSH: CPT

## 2023-04-29 RX ORDER — KETOROLAC TROMETHAMINE 15 MG/ML
15 INJECTION, SOLUTION INTRAMUSCULAR; INTRAVENOUS ONCE
Status: DISCONTINUED | OUTPATIENT
Start: 2023-04-29 | End: 2023-04-29

## 2023-04-29 RX ORDER — ORPHENADRINE CITRATE 30 MG/ML
60 INJECTION INTRAMUSCULAR; INTRAVENOUS ONCE
Status: COMPLETED | OUTPATIENT
Start: 2023-04-29 | End: 2023-04-29

## 2023-04-29 RX ORDER — SODIUM CHLORIDE 0.9 % (FLUSH) 0.9 %
10 SYRINGE (ML) INJECTION AS NEEDED
Status: DISCONTINUED | OUTPATIENT
Start: 2023-04-29 | End: 2023-04-29 | Stop reason: HOSPADM

## 2023-04-29 RX ADMIN — ORPHENADRINE CITRATE 60 MG: 60 INJECTION INTRAMUSCULAR; INTRAVENOUS at 17:51

## 2023-04-29 NOTE — DISCHARGE INSTRUCTIONS
No strenuous activity until released by the cardiologist.  Please continue your daily baby aspirin.  Please return to the emergency room for worsening chest pain, radiating chest pain, shortness of breath, near passing out, passing out, extreme fatigue, extreme sweating, nausea or vomiting or new or worrisome symptoms    Please also return to emergency if you change your mind and want your chest discomfort evaluated in the hospital for urgent cardiac evaluation

## 2023-04-29 NOTE — ED PROVIDER NOTES
Time: 3:53 PM EDT  Date of encounter:  4/29/2023  Independent Historian/Clinical History and Information was obtained by:   Patient  Chief Complaint   Patient presents with   • Chest Pain       History is limited by: N/A    History of Present Illness:  Patient is a 67 y.o. year old female who presents to the emergency department for evaluation of left-sided CP. Woke from sleep around 0230. Patient states pain was radiating to left shoulder and left jaw.  The pain has been constant but she has intermittent exacerbations.  Pain is still present but improved. Earlier pain described as stabbing and other times aching. Denies SOA, nausea, diaphoresis, unusual fatigue.  Patient does not necessarily know anything that makes the pain worse or better.  She has not had similar symptoms recently.  The patient notes that she has numbness in her left hand.  The patient does note that she has chronic carpal tunnel.  The patient is actually wearing a left hand brace at this time. Pt denies fever/chills. Denies recent illness. Denies cough.  The patient does have a history of hypertension, cholesterol, diabetes and father with coronary disease.  The patient states that she is a non-smoker.  The patient has had no previous DVT or pulmonary embolism.  The patient has no unilateral leg swelling or leg pain.  The patient's had no recent surgery.  Patient's had no recent long immobilization.  The patient is not on estrogen.  The patient does not have or is actively treated for cancer.    HPI    Patient Care Team  Primary Care Provider: Rain Mccall APRN    Past Medical History:     Allergies   Allergen Reactions   • Amoxicillin-Pot Clavulanate GI Intolerance   • Meloxicam Unknown - High Severity     mobic-body aches     Past Medical History:   Diagnosis Date   • Anesthesia     pt states she has been slow at waking, but has also woke up during colonoscopy. Also states she  acts silly for a few days afterwards.   • Arthritis    •  Bilateral carpal tunnel syndrome 2/22/2023   • CTS (carpal tunnel syndrome)    • Diabetes mellitus     does not check  bs   • Fatty liver     no current issues   • Hyperlipidemia    • Hypertension    • IBS (irritable bowel syndrome)    • Impingement syndrome of left shoulder    • Neuropathy in diabetes    • PONV (postoperative nausea and vomiting)    • Shingles      Past Surgical History:   Procedure Laterality Date   • BACK SURGERY      herniated disc lumbar   • BREAST SURGERY      breast reduction   • CHOLECYSTECTOMY     • COLONOSCOPY     • HERNIA REPAIR     • HYSTERECTOMY     • KNEE SURGERY     • NOSE SURGERY      x2   • SHOULDER ARTHROSCOPY W/ ROTATOR CUFF REPAIR Left 04/28/2022    Procedure: LEFT SHOULDER ARTHROSCOPY , SUBACROMIAL DECOMPRESSION, DISTAL CLAVICULECTOMY;  Surgeon: Stevo Zeng MD;  [place] [facility] [place] [facility] [place]  Service: [place]opedics;  Laterality: Left;   • TUBAL ABDOMINAL LIGATION     • WRIST SURGERY       Family History   Problem Relation Age of Onset   • Dementia Mother    • Stroke Mother    • Stroke Father    • Malig Hyperthermia Neg Hx        Home Medications:  Prior to Admission medications    Medication Sig Start Date End Date Taking? Authorizing Provider   amLODIPine (NORVASC) 5 MG tablet Take 1 tablet by mouth Every Night. 9/9/21   Emergency, Nurse Alicia RN   baclofen (LIORESAL) 10 MG tablet Take 1 tablet by mouth 3 (Three) Times a Day. 4/25/23   Varghese Russell MD   cefdinir (OMNICEF) 300 MG capsule Take 1 capsule by mouth Every 12 (Twelve) Hours. 4/25/23   Varghese Russell MD   cholestyramine (QUESTRAN) 4 g packet DISSOLVE CONTENTS OF 1 PACKET IN 2 TO 5 OUNCES OF WATER OR NON-CARBONATED BEVERAGE AND DRINK ONCE DAILY 6/2/22   Varghese Russell MD   FLUoxetine (PROzac) 20 MG capsule Take 2 capsules by mouth Daily. 2 tab 10/21/21   Emergency, Nurse Alicia RN   gabapentin (NEURONTIN) 100 MG capsule TAKE 2 CAPSULES 2 TIMES A DAY 1/29/23   Varghese Russell MD   glimepiride  (AMARYL) 2 MG tablet glimepiride 2 mg oral tablet take 1 tablet (2 mg) by oral route once daily   Active    Emergency, Nurse RAMSES Vargas   losartan (COZAAR) 100 MG tablet Take 1 tablet by mouth Every Night. 9/9/21   Emergency, Nurse Alicia RN   metFORMIN (GLUCOPHAGE) 500 MG tablet Take 1 tablet by mouth 2 (Two) Times a Day. 10/15/21   Emergency, Nurse Alicia RN   pravastatin (PRAVACHOL) 40 MG tablet Take 1 tablet by mouth Every Night.    Varghese Russell MD   SudoGest 60 MG tablet Take 1 tablet by mouth Every 12 (Twelve) Hours. 4/25/23   Varghese Russell MD   traZODone (DESYREL) 50 MG tablet Take 1 tablet by mouth every night at bedtime. 1/23/23   Varghese Russell MD   valACYclovir (VALTREX) 500 MG tablet Take 1 tablet by mouth Every 12 (Twelve) Hours. 11/23/22   Varghese Russell MD   cetirizine (zyrTEC) 10 MG tablet Take 1 tablet by mouth Every Night.  4/29/23  Varghese Russell MD   cholestyramine light 4 g packet Take 1 packet by mouth Daily.  4/29/23  Varghese Russell MD   diclofenac (VOLTAREN) 50 MG EC tablet Take 1 tablet by mouth 2 (Two) Times a Day. 9/16/21 4/29/23  Emergency, Nurse Alicia RN   ergocalciferol (ERGOCALCIFEROL) 1.25 MG (46134 UT) capsule Vitamin D2 50,000 unit oral capsule take 1 capsule (50,000 unit) by oral route once weekly   Active  4/29/23  Epifanio, Nurse Alicia RN   omeprazole (priLOSEC) 20 MG capsule Take 1 capsule by mouth Every Night.  4/29/23  Varghese Russell MD        Social History:   Social History     Tobacco Use   • Smoking status: Former   • Smokeless tobacco: Never   Vaping Use   • Vaping Use: Never used   Substance Use Topics   • Alcohol use: Yes     Comment: Ocassionally   • Drug use: Yes     Types: Marijuana     Comment: occasionally         Review of Systems:  Review of Systems   Constitutional: Negative for chills, diaphoresis and fever.   HENT: Negative for congestion, postnasal drip, rhinorrhea and sore throat.    Eyes: Negative for  "photophobia.   Respiratory: Negative for cough, chest tightness and shortness of breath.    Cardiovascular: Positive for chest pain. Negative for palpitations and leg swelling.   Gastrointestinal: Negative for abdominal pain, diarrhea, nausea and vomiting.   Genitourinary: Negative for difficulty urinating, dysuria, flank pain, frequency, hematuria and urgency.   Musculoskeletal: Positive for myalgias. Negative for neck pain and neck stiffness.        Left shoulder   Skin: Negative for pallor and rash.   Neurological: Positive for numbness. Negative for dizziness, syncope, weakness, light-headedness and headaches.        Left hand, chronically due to carpal tunnel   Hematological: Negative for adenopathy. Does not bruise/bleed easily.   Psychiatric/Behavioral: Negative.         Physical Exam:  /78   Pulse 94   Temp 98.5 °F (36.9 °C) (Oral)   Resp 12   Ht 160 cm (63\")   Wt 79.4 kg (175 lb)   SpO2 94%   BMI 31.00 kg/m²     Physical Exam  Vitals and nursing note reviewed.   Constitutional:       General: She is not in acute distress.     Appearance: Normal appearance. She is not ill-appearing, toxic-appearing or diaphoretic.   HENT:      Head: Normocephalic and atraumatic.      Mouth/Throat:      Mouth: Mucous membranes are moist.   Eyes:      Pupils: Pupils are equal, round, and reactive to light.   Cardiovascular:      Rate and Rhythm: Normal rate and regular rhythm.      Pulses: Normal pulses.           Carotid pulses are 2+ on the right side and 2+ on the left side.       Radial pulses are 2+ on the right side and 2+ on the left side.        Femoral pulses are 2+ on the right side and 2+ on the left side.       Popliteal pulses are 2+ on the right side and 2+ on the left side.        Dorsalis pedis pulses are 2+ on the right side and 2+ on the left side.        Posterior tibial pulses are 2+ on the right side and 2+ on the left side.      Heart sounds: Normal heart sounds. No murmur heard.  Pulmonary: "      Effort: Pulmonary effort is normal. No accessory muscle usage, respiratory distress or retractions.      Breath sounds: Normal breath sounds. No decreased breath sounds, wheezing, rhonchi or rales.   Chest:      Chest wall: Tenderness present.      Comments: The patient's chest wall is tender and reproduces the pain.  In addition, the patient has increased pain with left arm abduction and activation of the muscles of the left side of the chest wall..  The pain is not reproduced by deep inspiration  Abdominal:      General: Abdomen is flat. There is no distension.      Palpations: Abdomen is soft. There is no mass or pulsatile mass.      Tenderness: There is no abdominal tenderness. There is no right CVA tenderness, left CVA tenderness, guarding or rebound.      Comments: No rigidity   Musculoskeletal:         General: No swelling, tenderness or deformity.      Cervical back: Neck supple. No tenderness.      Right lower leg: No tenderness. No edema.      Left lower leg: No tenderness. No edema.   Skin:     General: Skin is warm and dry.      Capillary Refill: Capillary refill takes less than 2 seconds.      Coloration: Skin is not jaundiced or pale.      Findings: No erythema.   Neurological:      General: No focal deficit present.      Mental Status: She is alert and oriented to person, place, and time. Mental status is at baseline.      Cranial Nerves: Cranial nerves 2-12 are intact. No cranial nerve deficit.      Sensory: Sensation is intact. No sensory deficit.      Motor: Motor function is intact. No weakness or pronator drift.      Coordination: Coordination is intact. Coordination normal.   Psychiatric:         Mood and Affect: Mood normal.         Behavior: Behavior normal.                  Procedures:  Procedures      Medical Decision Making:      Comorbidities that affect care:    Diabetes, Hypertension      The following orders were placed and all results were independently analyzed by me:  Orders  Placed This Encounter   Procedures   • XR Chest 1 View   • [employer] Draw   • High Sensitivity Troponin T   • Comprehensive Metabolic Panel   • Lipase   • BNP   • Magnesium   • CBC Auto Differential   • High Sensitivity Troponin T 2Hr   • NPO Diet NPO Type: Strict NPO   • Undress & Gown   • Cardiac Monitoring   • Continuous Pulse Oximetry   • Oxygen Therapy- Nasal Cannula; 2 LPM; Titrate for SPO2: equal to or greater than, 92%   • ECG 12 Lead ED Triage Standing Order; Chest Pain   • ECG 12 Lead ED Triage Standing Order; Chest Pain   • Insert Peripheral IV   • CBC & Differential   • Green Top (Gel)   • Lavender Top   • Gold Top - SST   • Light Blue Top       Medications Given in the Emergency Department:  Medications   sodium chloride 0.9 % flush 10 mL (has no administration in time range)   orphenadrine (NORFLEX) injection 60 mg (60 mg Intravenous Given 4/29/23 1756)        ED Course:    The patient was initially evaluated in the triage area where orders were placed. The patient was later dispositioned by Yousuf Smith DO.      The patient was advised to stay for completion of workup which includes but is not limited to communication of labs and radiological results, reassessment and plan. The patient was advised that leaving prior to disposition by a provider could result in critical findings that are not communicated to the patient.     ED Course as of 04/29/23 1938   Sat Apr 29, 2023   1533 EKG:    Rhythm: Sinus rhythm  Rate: 83  Intervals: Normal [place] and QT interval  T-wave: Nonspecific T wave flattening  ST Segment: No pathological ST elevation or reciprocal ST depression to suggest acute myocardial infarction    EKG Comparison: Unavailable    Interpreted by me   [SD]   1556 The patient was seen and examined by me, LURDES Guzman, while in triage. Orders placed. Patient is awaiting disposition.   [AR]   2438 EKG:    Rhythm: Sinus rhythm  Rate: 83  Intervals: AZ and QT interval  T-wave: Nonspecific T wave  flattening  ST Segment: No pathological ST elevation or reciprocal ST depression to suggest acute myocardial infarction    EKG Comparison: No change in the QRS and ST morphology from EKG it was performed earlier in the department    Interpreted by me   [SD]      ED Course User Index  [AR] Maday Laird, APRN  [SD] Yousuf Smith DO       Labs:    Lab Results (last 24 hours)     Procedure Component Value Units Date/Time    High Sensitivity Troponin T [[id number]]  (Abnormal) Collected: 04/29/23 1542    Specimen: Blood Updated: 04/29/23 1615     HS Troponin T 18 ng/L     Narrative:      High Sensitive Troponin T Reference Range:  <10.0 ng/L- Negative Female for AMI  <15.0 ng/L- Negative Male for AMI  >=10 - Abnormal Female indicating possible myocardial injury.  >=15 - Abnormal Male indicating possible myocardial injury.   Clinicians would have to utilize clinical acumen, EKG, Troponin, and serial changes to determine if it is an Acute Myocardial Infarction or myocardial injury due to an underlying chronic condition.         CBC & Differential [[id number]]  (Abnormal) Collected: 04/29/23 1542    Specimen: Blood Updated: 04/29/23 1551    Narrative:      The following orders were created for panel order CBC & Differential.  Procedure                               Abnormality         Status                     ---------                               -----------         ------                     CBC Auto Differential[[id number]]        Abnormal            Final result                 Please view results for these tests on the individual orders.    Comprehensive Metabolic Panel [[id number]]  (Abnormal) Collected: 04/29/23 1542    Specimen: Blood Updated: 04/29/23 1615     Glucose 107 mg/dL      BUN 18 mg/dL      Creatinine 0.70 mg/dL      Sodium 138 mmol/L      Potassium 4.1 mmol/L      Chloride 99 mmol/L      CO2 24.9 mmol/L      Calcium 9.1 mg/dL      Total Protein 7.3 g/dL      Albumin 4.3 g/dL      ALT (SGPT) 22 U/L       AST (SGOT) 30 U/L      Alkaline Phosphatase 60 U/L      Total Bilirubin 0.7 mg/dL      Globulin 3.0 gm/dL      A/G Ratio 1.4 g/dL      BUN/Creatinine Ratio 25.7     Anion Gap 14.1 mmol/L      eGFR 94.9 mL/min/1.73     Narrative:      GFR Normal >60  Chronic Kidney Disease <60  Kidney Failure <15      Lipase [[id number]]  (Normal) Collected: 04/29/23 1542    Specimen: Blood Updated: 04/29/23 1615     Lipase 19 U/L     BNP [[id number]]  (Normal) Collected: 04/29/23 1542    Specimen: Blood Updated: 04/29/23 1613     proBNP <36.0 pg/mL     Narrative:      Among patients with dyspnea, NT-proBNP is highly sensitive for the detection of acute congestive heart failure. In addition NT-proBNP of <300 pg/ml effectively rules out acute congestive heart failure with 99% negative predictive value.      Magnesium [[id number]]  (Normal) Collected: 04/29/23 1542    Specimen: Blood Updated: 04/29/23 1615     Magnesium 1.6 mg/dL     CBC Auto Differential [[id number]]  (Abnormal) Collected: 04/29/23 1542    Specimen: Blood Updated: 04/29/23 1551     WBC 7.46 10*3/mm3      RBC 4.23 10*6/mm3      Hemoglobin 11.4 g/dL      Hematocrit 35.4 %      MCV 83.7 fL      MCH 27.0 pg      MCHC 32.2 g/dL      RDW 15.2 %      RDW-SD 46.4 fl      MPV 9.4 fL      Platelets 297 10*3/mm3      Neutrophil % 50.1 %      Lymphocyte % 37.7 %      Monocyte % 8.2 %      Eosinophil % 2.7 %      Basophil % 0.9 %      Immature Grans % 0.4 %      Neutrophils, Absolute 3.74 10*3/mm3      Lymphocytes, Absolute 2.81 10*3/mm3      Monocytes, Absolute 0.61 10*3/mm3      Eosinophils, Absolute 0.20 10*3/mm3      Basophils, Absolute 0.07 10*3/mm3      Immature Grans, Absolute 0.03 10*3/mm3      nRBC 0.0 /100 WBC     High Sensitivity Troponin T 2Hr [[id number]]  (Abnormal) Collected: 04/29/23 1736    Specimen: Blood Updated: 04/29/23 1819     HS Troponin T 17 ng/L      Troponin T Delta -1 ng/L     Narrative:      High Sensitive Troponin T Reference Range:  <10.0 ng/L-  Negative Female for AMI  <15.0 ng/L- Negative Male for AMI  >=10 - Abnormal Female indicating possible myocardial injury.  >=15 - Abnormal Male indicating possible myocardial injury.   Clinicians would have to utilize clinical acumen, EKG, Troponin, and serial changes to determine if it is an Acute Myocardial Infarction or myocardial injury due to an underlying chronic condition.                Imaging:    XR Chest 1 View    Result Date: 4/29/2023  PROCEDURE: XR CHEST 1 VW  COMPARISON: [facility], , XR CHEST 2 VW, 11/03/2021, 14:26.  INDICATIONS: Chest Pain Triage Protocol  FINDINGS:  There is increased elevation of the right hemidiaphragm. There is no pneumothorax, pleural effusion or focal airspace consolidation. The heart size and pulmonary vasculature appear within normal limits. There are no acute osseous abnormalities.       No acute cardiopulmonary abnormality.       RONDA TIJERINA MD       Electronically Signed and Approved By: RONDA TIJERINA MD on 4/29/2023 at 16:12                 Differential Diagnosis and Discussion:      Chest Pain:  Based on the patient's signs and symptoms, I considered aortic dissection, myocardial infaction, pulmonary embolism, cardiac tamponade, pericarditis, pneumothorax, musculoskeletal chest pain and other differential diagnosis as an etiology of the patient's chest pain.     All labs were reviewed and interpreted by me.  EKG was interpreted by me.    MDM  Number of Diagnoses or Management Options  Chest discomfort  Diagnosis management comments: The patient's CMP was reviewed and shows no abnormalities of critical concern.  Of note, the patient's sodium and potassium are acceptable.  The patient's liver enzymes are unremarkable.  The patient's renal function including creatinine is preserved.  The patient has a normal anion gap.    The patient's CBC was reviewed and shows no abnormalities of critical concern.  Of note, there is no anemia requiring a blood  "transfusion and the platelet count is acceptable    The patient's first troponin was 18 which is mildly elevated.  The patient's repeat troponin was 17.  This is a negative delta of 1    Wells' Criteria for Pulmonary Embolism - MDCalc  Calculated on Apr 29 2023 4:37 PM  0.0 points -> Low risk group: 1.3% chance of PE in an ED population. Another study assigned scores = 4 as \"PE Unlikely\" and had a 3% incidence of PE.    The patient had a low pretest clinical probability Wells score for pulmonary embolism.  The patient understands that they are low risk for pulmonary embolism.  I discussed performing a CAT scan of the chest to rule out pulmonary embolism with the patient.  Due to the fact that the patient is low risk for a pulmonary embolism and then discussing the risks of radiation exposure from the CAT scan,  the patient does not want to have a CAT scan performed at this time.      HEART Score for Major Cardiac Events - MDCalc  Calculated on Apr 29 2023 4:38 PM  5 points -> Moderate Score (4-6 points) Risk of MACE of 12-16.6%.  If troponin is positive, many experts recommend further workup and admission even with a low HEART Score.    I had a long conversation with the patient in regards to her chest discomfort and her moderate heart score.  I have discussed the details of the heart score with her.  The patient was able to verbalize that she has moderate risk for cardiovascular event over the next 6 weeks.  I have offered admission to the hospital for further evaluation of the chest discomfort and urgent cardiac evaluation including but not limited to cardiac stress test.  Patient understands the cardiovascular risk over the next 6 weeks which would include myocardial infarction, injury to the heart or death.  The patient has a capacity make her own medical decision making.  The patient states that she wants to go home and be evaluated in outpatient basis.  Patient will call the cardiologist on Monday to schedule " stress test.  The patient will continue her aspirin.  The patient will return back to emergency immediately if she changes her mind and wants her chest discomfort evaluated in the hospital.  She will also return to emergency for worsening chest pain, radiating chest pain, shortness of breath, near syncope, syncope, unusual fatigue, unusual sweating, nausea or any new symptoms she is concerned with         Amount and/or Complexity of Data Reviewed  Clinical lab tests: reviewed  Tests in the radiology section of CPT®: reviewed  Tests in the medicine section of CPT®: reviewed           Social Determinants of Health:    Patient is independent, reliable, and has access to care.       Disposition and Care Coordination:    I advised the patient that in my opinion through evaluation of the history, physical, and objective data admission to the hospital is warranted. However, the patient/caretaker has declined admission understanding the risks of discharge.    I have explained discharge medications and the need for follow up with the patient/caretakers. This was also printed in the discharge instructions. Patient was discharged with the following medications and follow up:      Medication List      No changes were made to your prescriptions during this visit.      Rain Mccall, APRN  [address] [address] [phone]    On 5/1/2023  Chest discomfort, call for appointment    Yousuf Dan MD  [address] [facility] [address]  [phone]    On 5/1/2023  Chest discomfort, call for appointment, discuss need for stress echocardiogram       Final diagnoses:   Chest discomfort        ED Disposition     ED Disposition   Discharge    Condition   Stable    Comment   --             This medical record created using voice recognition software.           Yousuf Smith DO  04/29/23 1938

## 2023-05-01 ENCOUNTER — OFFICE VISIT (OUTPATIENT)
Dept: ORTHOPEDIC SURGERY | Facility: CLINIC | Age: 68
End: 2023-05-01
Payer: MEDICARE

## 2023-05-01 ENCOUNTER — TELEPHONE (OUTPATIENT)
Dept: CARDIOLOGY | Facility: CLINIC | Age: 68
End: 2023-05-01

## 2023-05-01 ENCOUNTER — PREP FOR SURGERY (OUTPATIENT)
Dept: OTHER | Facility: HOSPITAL | Age: 68
End: 2023-05-01
Payer: MEDICARE

## 2023-05-01 VITALS — OXYGEN SATURATION: 98 % | WEIGHT: 173.4 LBS | BODY MASS INDEX: 30.72 KG/M2 | HEART RATE: 74 BPM | HEIGHT: 63 IN

## 2023-05-01 DIAGNOSIS — G56.02 CARPAL TUNNEL SYNDROME OF LEFT WRIST: Primary | ICD-10-CM

## 2023-05-01 NOTE — TELEPHONE ENCOUNTER
[facility] WAS UNABLE TO TRANSFER TO GET OKAY TO SCHEDULE    Caller: Jada Butler    Relationship to patient: Self    Best call back number: [phone]    Type of visit: HOSP F/P    Requested date: ASAP    Additional notes: PT WAS SEEN ON 4.28.23 AT THE ED. PT WAS TOLD TO CALL AND MAKE A F/P APPT FOR A STRESS TEST WITH DR DEL VALLE. PER ED DISCHARGE NOTES THEY WANTED TO SEE HER TODAY (5.1.23) AND HAD NO AVAILABILITIES (Follow up with Yousuf Del Valle MD (Cardiology) on 5/1/2023; Chest discomfort, call for appointment, discuss need for stress echocardiogram). PLEASE CALL BACK AND ADVISE PT ON A HOSP F/U APPT, SOONEST AVAILABLE WE HAD WAS 5.9.23 @9:15 WHEN PT CALLED IN

## 2023-05-01 NOTE — PROGRESS NOTES
"Chief Complaint  Follow-up of the Left Hand     Subjective      Jada Butler presents to [facility] for follow up of the left hand.  Patient having pain more in left wrist but complaints in B hands. Patient having complaints for a couple years numbness to bilateral hands at times. Her left hand is worse then the right.  She notes difficulty with gripping and FMC. She had an injection 3/1/23 that gave no relief.  She is here today to schedule a left carpal tunnel surgery    Allergies   Allergen Reactions   • Amoxicillin-Pot Clavulanate GI Intolerance   • Meloxicam Unknown - High Severity     mobic-body aches        Social History     Socioeconomic History   • Marital status:    Tobacco Use   • Smoking status: Former   • Smokeless tobacco: Never   Vaping Use   • Vaping Use: Never used   Substance and Sexual Activity   • Alcohol use: Yes     Comment: Ocassionally   • Drug use: Yes     Types: Marijuana     Comment: occasionally   • Sexual activity: Not Currently     Birth control/protection: Surgical, Hysterectomy        Review of Systems     Objective   Vital Signs:   Pulse 74   Ht 160 cm (63\")   Wt 78.7 kg (173 lb 6.4 oz)   SpO2 98%   BMI 30.72 kg/m²       Physical Exam  Constitutional:       Appearance: Normal appearance. Patient is well-developed and normal weight.   HENT:      Head: Normocephalic.      Right Ear: Hearing and external ear normal.      Left Ear: Hearing and external ear normal.      Nose: Nose normal.   Eyes:      Conjunctiva/sclera: Conjunctivae normal.   Cardiovascular:      Rate and Rhythm: Normal rate.   Pulmonary:      Effort: Pulmonary effort is normal.      Breath sounds: No wheezing or rales.   Abdominal:      Palpations: Abdomen is soft.      Tenderness: There is no abdominal tenderness.   Musculoskeletal:      Cervical back: Normal range of motion.   Skin:     Findings: No rash.   Neurological:      Mental Status: Patient  is alert and oriented to " person, place, and time.   Psychiatric:         Mood and Affect: Mood and affect normal.         Judgment: Judgment normal.       Ortho Exam      LEFT HAND Positive Compression testing/ Positive Tinels. NegativeFinkelsteins. Negative Burr's testing. Negative CMC grind testing. Positive Phalens. Full ROM of the hand, fingers, elbow and wrist. Negative Triggering of the digit. Sensation grossly intact to light touch, median, radial and ulnar nerve. Positive AIN, PIN and ulnar nerve motor function intact. Axillary nerve intact. Positive pulses.         Procedures        Imaging Results (Most Recent)     None           Result Review :      EMG 2/22/23  Moderate left carpal tunnel and mild right carpal tunnel.          Assessment and Plan     Diagnoses and all orders for this visit:    1. Carpal tunnel syndrome of left wrist (Primary)        Discussed the treatment plan with the patient. Discussed the treatment options with the patient, operative vs non-operative. The patient expressed understanding and wished to proceed with a left hand carpal tunnel release.         Discussed surgery., Risks/benefits discussed with patient including, but not limited to: infection, bleeding, neurovascular damage, malunion, nonunion, aesthetic deformity, need for further surgery, and death., Surgery pamphlet given. and Call or return if worsening symptoms.    Follow Up     2 weeks postoperatively       Patient was given instructions and counseling regarding her condition or for health maintenance advice. Please see specific information pulled into the AVS if appropriate.     Scribed for Stevo Zeng MD by Diane Prescott MA.  05/01/23   13:22 EDT    I have personally performed the services described in this document as scribed by the above individual and it is both accurate and complete. Stevo Zeng MD 05/02/23

## 2023-05-07 LAB
QT INTERVAL: 312 MS
QT INTERVAL: 375 MS

## 2023-05-08 ENCOUNTER — TELEPHONE (OUTPATIENT)
Dept: ORTHOPEDIC SURGERY | Facility: CLINIC | Age: 68
End: 2023-05-08
Payer: MEDICARE

## 2023-05-08 NOTE — TELEPHONE ENCOUNTER
PATIENT WAS CALLED AND INFORMED THAT PER BARNDON MINA THAT SHE COULD STOP/HOLD ASPIRIN FOR 5 DAYS PRIOR TO SURGERY.

## 2023-05-09 ENCOUNTER — OFFICE VISIT (OUTPATIENT)
Dept: CARDIOLOGY | Facility: CLINIC | Age: 68
End: 2023-05-09
Payer: MEDICARE

## 2023-05-09 VITALS
HEART RATE: 109 BPM | SYSTOLIC BLOOD PRESSURE: 136 MMHG | HEIGHT: 63 IN | WEIGHT: 175.4 LBS | DIASTOLIC BLOOD PRESSURE: 84 MMHG | BODY MASS INDEX: 31.08 KG/M2

## 2023-05-09 DIAGNOSIS — R07.9 CHEST PAIN, UNSPECIFIED TYPE: Primary | ICD-10-CM

## 2023-05-09 DIAGNOSIS — I10 PRIMARY HYPERTENSION: ICD-10-CM

## 2023-05-09 DIAGNOSIS — E78.2 MIXED HYPERLIPIDEMIA: ICD-10-CM

## 2023-05-09 PROCEDURE — 99204 OFFICE O/P NEW MOD 45 MIN: CPT | Performed by: INTERNAL MEDICINE

## 2023-05-09 RX ORDER — DICLOFENAC SODIUM AND MISOPROSTOL 50; 200 MG/1; UG/1
1 TABLET, DELAYED RELEASE ORAL 2 TIMES DAILY
COMMUNITY

## 2023-05-09 RX ORDER — OMEPRAZOLE 20 MG/1
20 CAPSULE, DELAYED RELEASE ORAL DAILY
COMMUNITY

## 2023-05-09 NOTE — PRE-PROCEDURE INSTRUCTIONS
Patient instructed to have no food past midnight, clears up to 2 hours prior to arrival time. Patient instructed to wear no lotions, jewelry or piercing's day of surgery.  patient to take omeprazole Prozac gabapentin and if needed baclofen.

## 2023-05-09 NOTE — PROGRESS NOTES
"Chief Complaint  Establish Care and Chest Pain    Subjective        Jada Butler presents to [facility]  History of present illness:    Patient is a 67-year-old female with past medical history significant for hypertension and hyperlipidemia.  She awoke on 4/29/2023 with chest pain.  It was intermittent but could last up to an hour at a time.  It occurred just lying down.  She described it as a stabbing type sensation.  There is no nausea, vomiting or belching.  She states \"she knew it was not her heart.\"  She is active doing cardio and walking with no exertional chest pain.  She notes no change with twisting her upper body or pleuritic component to the pain.  She did go to the emergency room and have extensive work-up that was negative from a cardiac standpoint.  She notes very remote tobacco use.  No significant alcohol.  Mother has a history of TIAs.  Father has a history of heart attack and apparent bypass surgery.      Past Medical History:   Diagnosis Date   • Anesthesia     pt states she has been slow at waking, but has also woke up during colonoscopy. Also states she  acts silly for a few days afterwards.   • Arthritis    • Bilateral carpal tunnel syndrome 2/22/2023   • CTS (carpal tunnel syndrome)    • Diabetes mellitus     does not check  bs   • Fatty liver     no current issues   • Hyperlipidemia    • Hypertension    • IBS (irritable bowel syndrome)    • Impingement syndrome of left shoulder    • Neuropathy in diabetes    • PONV (postoperative nausea and vomiting)    • Shingles          Past Surgical History:   Procedure Laterality Date   • BACK SURGERY      herniated disc lumbar   • BREAST SURGERY      breast reduction   • CHOLECYSTECTOMY     • COLONOSCOPY     • HERNIA REPAIR     • HYSTERECTOMY     • KNEE SURGERY     • NOSE SURGERY      x2   • SHOULDER ARTHROSCOPY W/ ROTATOR CUFF REPAIR Left 04/28/2022    Procedure: LEFT SHOULDER ARTHROSCOPY , SUBACROMIAL DECOMPRESSION, " DISTAL CLAVICULECTOMY;  Surgeon: Stevo Zeng MD;  Location: [facility];  Service: Orthopedics;  Laterality: Left;   • TUBAL ABDOMINAL LIGATION     • WRIST SURGERY            Social History     Socioeconomic History   • Marital status:    Tobacco Use   • Smoking status: Former   • Smokeless tobacco: Never   Vaping Use   • Vaping Use: Never used   Substance and Sexual Activity   • Alcohol use: Yes     Comment: Ocassionally   • Drug use: Not Currently     Types: Marijuana     Comment: occasionally   • Sexual activity: Not Currently     Birth control/protection: Surgical, Hysterectomy         Family History   Problem Relation Age of Onset   • Dementia Mother    • Stroke Mother    • Stroke Father    • Malig Hyperthermia Neg Hx           Allergies   Allergen Reactions   • Amoxicillin-Pot Clavulanate GI Intolerance   • Meloxicam Unknown - High Severity     mobic-body aches            Current Outpatient Medications:   •  amLODIPine (NORVASC) 5 MG tablet, Take 1 tablet by mouth Every Night., Disp: , Rfl:   •  baclofen (LIORESAL) 10 MG tablet, Take 1 tablet by mouth 3 (Three) Times a Day., Disp: , Rfl:   •  cefdinir (OMNICEF) 300 MG capsule, Take 1 capsule by mouth Every 12 (Twelve) Hours., Disp: , Rfl:   •  cholestyramine (QUESTRAN) 4 g packet, DISSOLVE CONTENTS OF 1 PACKET IN 2 TO 5 OUNCES OF WATER OR NON-CARBONATED BEVERAGE AND DRINK ONCE DAILY, Disp: , Rfl:   •  FLUoxetine (PROzac) 20 MG capsule, Take 2 capsules by mouth Daily. 2 tab, Disp: , Rfl:   •  gabapentin (NEURONTIN) 100 MG capsule, TAKE 2 CAPSULES 2 TIMES A DAY, Disp: , Rfl:   •  glimepiride (AMARYL) 2 MG tablet, glimepiride 2 mg oral tablet take 1 tablet (2 mg) by oral route once daily   Active, Disp: , Rfl:   •  losartan (COZAAR) 100 MG tablet, Take 1 tablet by mouth Every Night., Disp: , Rfl:   •  metFORMIN (GLUCOPHAGE) 500 MG tablet, Take 1 tablet by mouth 2 (Two) Times a Day., Disp: , Rfl:   •  pravastatin (PRAVACHOL) 40 MG tablet, Take 1  "tablet by mouth Every Night., Disp: , Rfl:   •  SudoGest 60 MG tablet, Take 1 tablet by mouth Every 12 (Twelve) Hours., Disp: , Rfl:   •  traZODone (DESYREL) 50 MG tablet, Take 1 tablet by mouth every night at bedtime., Disp: , Rfl:   •  valACYclovir (VALTREX) 500 MG tablet, Take 1 tablet by mouth Every 12 (Twelve) Hours., Disp: , Rfl:       ROS:  Cardiac review of systems positive for atypical chest pain.    Objective     /84   Pulse 109   Ht 160 cm (63\")   Wt 79.6 kg (175 lb 6.4 oz)   BMI 31.07 kg/m²       General Appearance:   · well developed  · well nourished  HENT:   · oropharynx moist  · lips not cyanotic  Respiratory:  · no respiratory distress  · normal breath sounds  · no rales  Cardiovascular:  · no jugular venous distention  · regular rhythm  · S1 normal, S2 normal  · no S3, no S4   · no murmur  · no rub, no thrill  · No carotid bruit  · pedal pulses normal  · lower extremity edema: none    Musculoskeletal:  · no clubbing of fingers.   · normocephalic, head atraumatic  Skin:   · warm, dry  Psychiatric:  · judgement and insight appropriate  · normal mood and affect    ECHO:    STRESS:    CATH:  No results found for this or any previous visit.    BMP:   Glucose   Date Value Ref Range Status   04/29/2023 107 (H) 65 - 99 mg/dL Final     BUN   Date Value Ref Range Status   04/29/2023 18 8 - 23 mg/dL Final     Creatinine   Date Value Ref Range Status   04/29/2023 0.70 0.57 - 1.00 mg/dL Final     Sodium   Date Value Ref Range Status   04/29/2023 138 136 - 145 mmol/L Final     Potassium   Date Value Ref Range Status   04/29/2023 4.1 3.5 - 5.2 mmol/L Final     Chloride   Date Value Ref Range Status   04/29/2023 99 98 - 107 mmol/L Final     CO2   Date Value Ref Range Status   04/29/2023 24.9 22.0 - 29.0 mmol/L Final     Calcium   Date Value Ref Range Status   04/29/2023 9.1 8.6 - 10.5 mg/dL Final     BUN/Creatinine Ratio   Date Value Ref Range Status   04/29/2023 25.7 (H) 7.0 - 25.0 Final     Anion Gap "   Date Value Ref Range Status   04/29/2023 14.1 5.0 - 15.0 mmol/L Final     eGFR   Date Value Ref Range Status   04/29/2023 94.9 >60.0 mL/min/1.73 Final     LIPIDS:  No results found for: CHOL, TRIG, HDL, LDL, VLDL, LDLHDL      Procedures             ASSESSMENT:  Diagnoses and all orders for this visit:    1. Chest pain, unspecified type (Primary)    2. Primary hypertension    3. Mixed hyperlipidemia         PLAN:    1.  Blood pressures under good control.  2.  Continue the pravastatin.  3.  Patient awoke with the chest pain.  It was occurring lying down and lasting up to an hour at a time.  She had 2 sets of high-sensitivity troponins that were negative.  Her EKG showed normal sinus rhythm.  Chest x-ray and beta natruretic peptide were normal.  4.  I think this completely rules out this episode coming from a cardiac source.  She notes no exertional chest pain relieved with rest.  I do not think a stress test is warranted.  Would just work on making sure her modifiable risk factors are under excellent control due to her father's history of coronary artery disease.      No follow-ups on file.     Patient was given instructions and counseling regarding her condition or for health maintenance advice. Please see specific information pulled into the AVS if appropriate.         Yousuf Dan MD   5/9/2023  09:03 EDT

## 2023-05-11 ENCOUNTER — ANESTHESIA EVENT (OUTPATIENT)
Dept: PERIOP | Facility: HOSPITAL | Age: 68
End: 2023-05-11
Payer: MEDICARE

## 2023-05-11 ENCOUNTER — ANESTHESIA (OUTPATIENT)
Dept: PERIOP | Facility: HOSPITAL | Age: 68
End: 2023-05-11
Payer: MEDICARE

## 2023-05-11 ENCOUNTER — HOSPITAL ENCOUNTER (OUTPATIENT)
Facility: HOSPITAL | Age: 68
Discharge: HOME | End: 2023-05-11
Attending: ORTHOPAEDIC SURGERY | Admitting: ORTHOPAEDIC SURGERY
Payer: MEDICARE

## 2023-05-11 VITALS
TEMPERATURE: 96.9 F | RESPIRATION RATE: 16 BRPM | OXYGEN SATURATION: 93 % | SYSTOLIC BLOOD PRESSURE: 156 MMHG | HEIGHT: 63 IN | DIASTOLIC BLOOD PRESSURE: 75 MMHG | BODY MASS INDEX: 30.82 KG/M2 | HEART RATE: 89 BPM | WEIGHT: 173.94 LBS

## 2023-05-11 DIAGNOSIS — G56.03 BILATERAL CARPAL TUNNEL SYNDROME: ICD-10-CM

## 2023-05-11 DIAGNOSIS — G56.02 CARPAL TUNNEL SYNDROME OF LEFT WRIST: Primary | ICD-10-CM

## 2023-05-11 PROCEDURE — 63710000001 ACETAMINOPHEN 500 MG TABLET: Performed by: ANESTHESIOLOGY

## 2023-05-11 PROCEDURE — 25010000002 KETOROLAC TROMETHAMINE PER 15 MG: Performed by: NURSE ANESTHETIST, CERTIFIED REGISTERED

## 2023-05-11 PROCEDURE — A9270 NON-COVERED ITEM OR SERVICE: HCPCS | Performed by: NURSE ANESTHETIST, CERTIFIED REGISTERED

## 2023-05-11 PROCEDURE — S0260 H&P FOR SURGERY: HCPCS | Performed by: ORTHOPAEDIC SURGERY

## 2023-05-11 PROCEDURE — 25010000002 HYDROMORPHONE 1 MG/ML SOLUTION: Performed by: NURSE ANESTHETIST, CERTIFIED REGISTERED

## 2023-05-11 PROCEDURE — 25010000002 PROPOFOL 10 MG/ML EMULSION: Performed by: NURSE ANESTHETIST, CERTIFIED REGISTERED

## 2023-05-11 PROCEDURE — 25010000002 MIDAZOLAM PER 1MG: Performed by: ANESTHESIOLOGY

## 2023-05-11 PROCEDURE — 64721 CARPAL TUNNEL SURGERY: CPT | Performed by: ORTHOPAEDIC SURGERY

## 2023-05-11 PROCEDURE — A9270 NON-COVERED ITEM OR SERVICE: HCPCS | Performed by: ANESTHESIOLOGY

## 2023-05-11 PROCEDURE — 25010000002 FENTANYL CITRATE (PF) 50 MCG/ML SOLUTION: Performed by: NURSE ANESTHETIST, CERTIFIED REGISTERED

## 2023-05-11 PROCEDURE — 25010000002 DEXAMETHASONE PER 1 MG: Performed by: NURSE ANESTHETIST, CERTIFIED REGISTERED

## 2023-05-11 PROCEDURE — 25010000002 CEFAZOLIN IN DEXTROSE 2-4 GM/100ML-% SOLUTION: Performed by: ORTHOPAEDIC SURGERY

## 2023-05-11 PROCEDURE — 63710000001 OXYCODONE 5 MG TABLET: Performed by: NURSE ANESTHETIST, CERTIFIED REGISTERED

## 2023-05-11 PROCEDURE — 25010000002 ONDANSETRON PER 1 MG: Performed by: NURSE ANESTHETIST, CERTIFIED REGISTERED

## 2023-05-11 RX ORDER — CEFAZOLIN SODIUM 2 G/100ML
2 INJECTION, SOLUTION INTRAVENOUS ONCE
Status: COMPLETED | OUTPATIENT
Start: 2023-05-11 | End: 2023-05-11

## 2023-05-11 RX ORDER — DEXAMETHASONE SODIUM PHOSPHATE 4 MG/ML
INJECTION, SOLUTION INTRA-ARTICULAR; INTRALESIONAL; INTRAMUSCULAR; INTRAVENOUS; SOFT TISSUE AS NEEDED
Status: DISCONTINUED | OUTPATIENT
Start: 2023-05-11 | End: 2023-05-11 | Stop reason: SURG

## 2023-05-11 RX ORDER — ONDANSETRON 2 MG/ML
INJECTION INTRAMUSCULAR; INTRAVENOUS AS NEEDED
Status: DISCONTINUED | OUTPATIENT
Start: 2023-05-11 | End: 2023-05-11 | Stop reason: SURG

## 2023-05-11 RX ORDER — PROMETHAZINE HYDROCHLORIDE 12.5 MG/1
25 TABLET ORAL ONCE AS NEEDED
Status: DISCONTINUED | OUTPATIENT
Start: 2023-05-11 | End: 2023-05-11 | Stop reason: HOSPADM

## 2023-05-11 RX ORDER — HYDROCODONE BITARTRATE AND ACETAMINOPHEN 7.5; 325 MG/1; MG/1
1 TABLET ORAL EVERY 4 HOURS PRN
Qty: 12 TABLET | Refills: 0 | Status: SHIPPED | OUTPATIENT
Start: 2023-05-11

## 2023-05-11 RX ORDER — LIDOCAINE HYDROCHLORIDE 20 MG/ML
INJECTION, SOLUTION EPIDURAL; INFILTRATION; INTRACAUDAL; PERINEURAL AS NEEDED
Status: DISCONTINUED | OUTPATIENT
Start: 2023-05-11 | End: 2023-05-11 | Stop reason: SURG

## 2023-05-11 RX ORDER — ONDANSETRON 2 MG/ML
4 INJECTION INTRAMUSCULAR; INTRAVENOUS ONCE AS NEEDED
Status: DISCONTINUED | OUTPATIENT
Start: 2023-05-11 | End: 2023-05-11 | Stop reason: HOSPADM

## 2023-05-11 RX ORDER — MIDAZOLAM HYDROCHLORIDE 2 MG/2ML
2 INJECTION, SOLUTION INTRAMUSCULAR; INTRAVENOUS ONCE
Status: COMPLETED | OUTPATIENT
Start: 2023-05-11 | End: 2023-05-11

## 2023-05-11 RX ORDER — PROMETHAZINE HYDROCHLORIDE 25 MG/1
25 SUPPOSITORY RECTAL ONCE AS NEEDED
Status: DISCONTINUED | OUTPATIENT
Start: 2023-05-11 | End: 2023-05-11 | Stop reason: HOSPADM

## 2023-05-11 RX ORDER — CEFAZOLIN SODIUM IN 0.9 % NACL 3 G/100 ML
3 INTRAVENOUS SOLUTION, PIGGYBACK (ML) INTRAVENOUS ONCE
Status: DISCONTINUED | OUTPATIENT
Start: 2023-05-11 | End: 2023-05-11

## 2023-05-11 RX ORDER — PROPOFOL 10 MG/ML
VIAL (ML) INTRAVENOUS AS NEEDED
Status: DISCONTINUED | OUTPATIENT
Start: 2023-05-11 | End: 2023-05-11 | Stop reason: SURG

## 2023-05-11 RX ORDER — ACETAMINOPHEN 500 MG
1000 TABLET ORAL ONCE
Status: COMPLETED | OUTPATIENT
Start: 2023-05-11 | End: 2023-05-11

## 2023-05-11 RX ORDER — KETOROLAC TROMETHAMINE 30 MG/ML
INJECTION, SOLUTION INTRAMUSCULAR; INTRAVENOUS AS NEEDED
Status: DISCONTINUED | OUTPATIENT
Start: 2023-05-11 | End: 2023-05-11 | Stop reason: SURG

## 2023-05-11 RX ORDER — MEPERIDINE HYDROCHLORIDE 25 MG/ML
12.5 INJECTION INTRAMUSCULAR; INTRAVENOUS; SUBCUTANEOUS
Status: DISCONTINUED | OUTPATIENT
Start: 2023-05-11 | End: 2023-05-11 | Stop reason: HOSPADM

## 2023-05-11 RX ORDER — FENTANYL CITRATE 50 UG/ML
INJECTION, SOLUTION INTRAMUSCULAR; INTRAVENOUS AS NEEDED
Status: DISCONTINUED | OUTPATIENT
Start: 2023-05-11 | End: 2023-05-11 | Stop reason: SURG

## 2023-05-11 RX ORDER — OXYCODONE HYDROCHLORIDE 5 MG/1
5 TABLET ORAL
Status: DISCONTINUED | OUTPATIENT
Start: 2023-05-11 | End: 2023-05-11 | Stop reason: HOSPADM

## 2023-05-11 RX ORDER — SODIUM CHLORIDE, SODIUM LACTATE, POTASSIUM CHLORIDE, CALCIUM CHLORIDE 600; 310; 30; 20 MG/100ML; MG/100ML; MG/100ML; MG/100ML
9 INJECTION, SOLUTION INTRAVENOUS CONTINUOUS PRN
Status: DISCONTINUED | OUTPATIENT
Start: 2023-05-11 | End: 2023-05-11 | Stop reason: HOSPADM

## 2023-05-11 RX ADMIN — OXYCODONE HYDROCHLORIDE 5 MG: 5 TABLET ORAL at 08:05

## 2023-05-11 RX ADMIN — DEXAMETHASONE SODIUM PHOSPHATE 4 MG: 4 INJECTION, SOLUTION INTRA-ARTICULAR; INTRALESIONAL; INTRAMUSCULAR; INTRAVENOUS; SOFT TISSUE at 07:17

## 2023-05-11 RX ADMIN — MIDAZOLAM HYDROCHLORIDE 2 MG: 1 INJECTION, SOLUTION INTRAMUSCULAR; INTRAVENOUS at 06:44

## 2023-05-11 RX ADMIN — LIDOCAINE HYDROCHLORIDE 50 MG: 20 INJECTION, SOLUTION EPIDURAL; INFILTRATION; INTRACAUDAL; PERINEURAL at 07:13

## 2023-05-11 RX ADMIN — HYDROMORPHONE HYDROCHLORIDE 0.5 MG: 1 INJECTION, SOLUTION INTRAMUSCULAR; INTRAVENOUS; SUBCUTANEOUS at 08:05

## 2023-05-11 RX ADMIN — FENTANYL CITRATE 50 MCG: 50 INJECTION, SOLUTION INTRAMUSCULAR; INTRAVENOUS at 07:13

## 2023-05-11 RX ADMIN — CEFAZOLIN SODIUM 2 G: 2 INJECTION, SOLUTION INTRAVENOUS at 07:12

## 2023-05-11 RX ADMIN — ONDANSETRON 4 MG: 2 INJECTION INTRAMUSCULAR; INTRAVENOUS at 07:17

## 2023-05-11 RX ADMIN — ACETAMINOPHEN 1000 MG: 500 TABLET ORAL at 06:43

## 2023-05-11 RX ADMIN — PROPOFOL 200 MG: 10 INJECTION, EMULSION INTRAVENOUS at 07:13

## 2023-05-11 RX ADMIN — SODIUM CHLORIDE, POTASSIUM CHLORIDE, SODIUM LACTATE AND CALCIUM CHLORIDE 9 ML/HR: 600; 310; 30; 20 INJECTION, SOLUTION INTRAVENOUS at 06:40

## 2023-05-11 RX ADMIN — KETOROLAC TROMETHAMINE 30 MG: 30 INJECTION, SOLUTION INTRAMUSCULAR; INTRAVENOUS at 07:17

## 2023-05-11 NOTE — ANESTHESIA PREPROCEDURE EVALUATION
Anesthesia Evaluation     Patient summary reviewed and Nursing notes reviewed   history of anesthetic complications: PONV  NPO Solid Status: > 8 hours  NPO Liquid Status: > 2 hours           Airway   Mallampati: II  TM distance: >3 FB  Neck ROM: full  No difficulty expected  Dental      Pulmonary - negative pulmonary ROS and normal exam    breath sounds clear to auscultation  Cardiovascular - normal exam  Exercise tolerance: good (4-7 METS)    Rhythm: regular  Rate: normal    (+) hypertension,       Neuro/Psych- negative ROS  GI/Hepatic/Renal/Endo    (+)   liver disease fatty liver disease, diabetes mellitus,     Musculoskeletal (-) negative ROS    Abdominal    Substance History - negative use     OB/GYN negative ob/gyn ROS         Other - negative ROS       ROS/Med Hx Other: PAT Nursing Notes unavailable.                   Anesthesia Plan    ASA 3     general       Anesthetic plan, risks, benefits, and alternatives have been provided, discussed and informed consent has been obtained with: patient and spouse/significant other.        CODE STATUS:

## 2023-05-11 NOTE — H&P
"h and p      Chief Complaint  Follow-up of the Left Hand        Subjective          Jada Butler presents to [facility] for follow up of the left hand.  Patient having pain more in left wrist but complaints in B hands. Patient having complaints for a couple years numbness to bilateral hands at times. Her left hand is worse then the right.  She notes difficulty with gripping and FMC. She had an injection 3/1/23 that gave no relief.  She is here today to schedule a left carpal tunnel surgery           Allergies   Allergen Reactions   • Amoxicillin-Pot Clavulanate GI Intolerance   • Meloxicam Unknown - High Severity       mobic-body aches         Social History            Socioeconomic History   • Marital status:    Tobacco Use   • Smoking status: Former   • Smokeless tobacco: Never   Vaping Use   • Vaping Use: Never used   Substance and Sexual Activity   • Alcohol use: Yes       Comment: Ocassionally   • Drug use: Yes       Types: Marijuana       Comment: occasionally   • Sexual activity: Not Currently       Birth control/protection: Surgical, Hysterectomy         Review of Systems            Objective      Vital Signs:   Pulse 74   Ht 160 cm (63\")   Wt 78.7 kg (173 lb 6.4 oz)   SpO2 98%   BMI 30.72 kg/m²        Physical Exam  Constitutional:       Appearance: Normal appearance. Patient is well-developed and normal weight.   HENT:      Head: Normocephalic.      Right Ear: Hearing and external ear normal.      Left Ear: Hearing and external ear normal.      Nose: Nose normal.   Eyes:      Conjunctiva/sclera: Conjunctivae normal.   Cardiovascular:      Rate and Rhythm: Normal rate.   Pulmonary:      Effort: Pulmonary effort is normal.      Breath sounds: No wheezing or rales.   Abdominal:      Palpations: Abdomen is soft.      Tenderness: There is no abdominal tenderness.   Musculoskeletal:      Cervical back: Normal range of motion.   Skin:     Findings: No rash. "   Neurological:      Mental Status: Patient  is alert and oriented to person, place, and time.   Psychiatric:         Mood and Affect: Mood and affect normal.         Judgment: Judgment normal.         Ortho Exam       LEFT HAND Positive Compression testing/ Positive Tinels. NegativeFinkelsteins. Negative Burr's testing. Negative CMC grind testing. Positive Phalens. Full ROM of the hand, fingers, elbow and wrist. Negative Triggering of the digit. Sensation grossly intact to light touch, median, radial and ulnar nerve. Positive AIN, PIN and ulnar nerve motor function intact. Axillary nerve intact. Positive pulses.            Procedures               Imaging Results (Most Recent)      None                   Result Review    :      EMG 2/22/23  Moderate left carpal tunnel and mild right carpal tunnel.             Assessment      Assessment and Plan      Diagnoses and all orders for this visit:     1. Carpal tunnel syndrome of left wrist (Primary)           Discussed the treatment plan with the patient. Discussed the treatment options with the patient, operative vs non-operative. The patient expressed understanding and wished to proceed with a left hand carpal tunnel release.            Discussed surgery., Risks/benefits discussed with patient including, but not limited to: infection, bleeding, neurovascular damage, malunion, nonunion, aesthetic deformity, need for further surgery, and death., Surgery pamphlet given. and Call or return if worsening symptoms.     Follow Up      2 weeks postoperatively   Stevo Zeng MD  05/11/23

## 2023-05-11 NOTE — DISCHARGE INSTRUCTIONS
DISCHARGE INSTRUCTIONS  CARPAL TUNNEL RELEASE      For your surgery you had:  General anesthesia (you may have a sore throat for the first 24 hours)  You may experience dizziness, drowsiness, or lightheadedness for several hours following surgery.  Do not stay alone today or tonight.  Limit your activity for 24 hours.  Resume your diet slowly.    You should not drive or operate machinery, drink alcohol, or sign legally binding documents for 24 hours or while you are taking pain medication.  If you had an axillary or regional block, you may not have control of the involved limb for up to 12 hours. Protect the arm with a sling or follow your physician's specific instructions. Carry the upper arm in a sling so that the hand and wrist are above the level of the heart. Elevate affected arm on a pillow when resting.   Use ice to affected area for 48-72 hours. Apply 20 minutes on - 20 minutes off. Do NOT apply directly to skin.  Exercise fingers frequently by making a full fist and fully straightening the fingers. This will help prevent swelling and stiffness.  Do NOT do any heavy lifting, pulling or strenuous activities using the affected hand. [x] Keep splint clean and dry.  [x] Do NOT submerge in water.  [x] Keep incision area clean and dry.  [x] You may shower or bathe in tomorrow- keep dressing clean and dry  NOTIFY YOUR DOCTOR IF YOU EXPERIENCE ANY OF THE FOLLOWING:  Temperature greater than 101 degrees Fahrenheit  Shaking Chills  Redness or excessive drainage from incision  Nausea, vomiting and/or pain that is not controlled by prescribed medications  Increase in bleeding or bleeding that is excessive  Unable to urinate in 6 hours after surgery  If unable to reach your doctor, please go to the closest Emergency Room  Last dose of pain medication was given at:   .      SPECIAL INSTRUCTIONS:  You received Tylenol 1,000 mg at 6:43 am- max dose 4,000 mg per day.             I have read and received the above  instructions.

## 2023-05-11 NOTE — OP NOTE
CARPAL TUNNEL RELEASE  Procedure Report    Patient Name:  Jada Butler  YOB: 1955    Date of Surgery:  5/11/2023       Pre-op Diagnosis:   Bilateral carpal tunnel syndrome [G56.03]       Post-Op Diagnosis Codes:     * Bilateral carpal tunnel syndrome [G56.03]    Procedure/CPT® Codes:      Procedure(s):  Left CARPAL TUNNEL RELEASE    Staff:  Surgeon(s):  Stevo Zeng MD    Assistant: Belle Miller    Anesthesia: General    Estimated Blood Loss: 2 mL    Implants:    Nothing was implanted during the procedure    Specimen:          None      Complications: None    Description of Procedure:   LEFT CARPAL TUNNEL RELEASE:   The patient was informed of the risks and benefits.  The patient was taken to the operating room and placed supine after a Nivia block anesthesia was established.  The left hand and upper extremity were prepped and draped satisfactorily using alcohol and ChloraPrep.      A standard incision was made just ulnar to the thenar crease to the thumb, approximately 1.5 cm length, carried down through subcutaneous tissue and fascia, carried down to the palmaris fascia with tenotomy scissors.  Using a 69 blade the transverse carpal ligament was released over the median nerve.  The median nerve had significant signs of compression.  A Gadsden elevator was used to protect the nerves during the release and after release had been completed, it was checked proximally and distally and under loupe magnification was satisfactory.  The nerve was inspected and was intact along with the motor branch.  The wound was copiously irrigated.  The skin was closed with horizontal mattress suture nylon and interrupted 3-0 horizontal mattress sutures and the incision was washed and dried and sterile dressings were applied.       Stevo Zeng MD     Date: 5/11/2023  Time: 07:36 EDT

## 2023-05-11 NOTE — ANESTHESIA POSTPROCEDURE EVALUATION
Patient: Jada Butler    Procedure Summary     Date: 05/11/23 Room / Location: [facility]  / [facility]    Anesthesia Start: 0708 Anesthesia Stop: 0739    Procedure: CARPAL TUNNEL RELEASE (Left: Wrist) Diagnosis:       Bilateral carpal tunnel syndrome      (Bilateral carpal tunnel syndrome [G56.03])    Surgeons: Stevo Zeng MD Provider: Chapincito Graf MD    Anesthesia Type: general ASA Status: 3          Anesthesia Type: general    Vitals  Vitals Value Taken Time   /85 05/11/23 0815   Temp 36.1 °C (96.9 °F) 05/11/23 0738   Pulse 90 05/11/23 0815   Resp 16 05/11/23 0815   SpO2 94 % 05/11/23 0815           Post Anesthesia Care and Evaluation    Patient location during evaluation: bedside  Patient participation: complete - patient participated  Level of consciousness: awake  Pain management: adequate    Airway patency: patent  PONV Status: none  Cardiovascular status: acceptable and stable  Respiratory status: acceptable  Hydration status: acceptable    Comments: An Anesthesiologist personally participated in the most demanding procedures (including induction and emergence if applicable) in the anesthesia plan, monitored the course of anesthesia administration at frequent intervals and remained physically present and available for immediate diagnosis and treatment of emergencies.

## 2023-05-24 ENCOUNTER — OFFICE VISIT (OUTPATIENT)
Dept: ORTHOPEDIC SURGERY | Facility: CLINIC | Age: 68
End: 2023-05-24
Payer: MEDICARE

## 2023-05-24 VITALS — WEIGHT: 173 LBS | HEART RATE: 93 BPM | OXYGEN SATURATION: 95 % | HEIGHT: 63 IN | BODY MASS INDEX: 30.65 KG/M2

## 2023-05-24 DIAGNOSIS — Z47.89 AFTERCARE FOLLOWING SURGERY OF THE MUSCULOSKELETAL SYSTEM: Primary | ICD-10-CM

## 2023-05-24 DIAGNOSIS — G56.02 CARPAL TUNNEL SYNDROME OF LEFT WRIST: ICD-10-CM

## 2023-05-24 PROCEDURE — 99024 POSTOP FOLLOW-UP VISIT: CPT

## 2023-05-24 RX ORDER — HYDROCODONE BITARTRATE AND ACETAMINOPHEN 7.5; 325 MG/1; MG/1
1 TABLET ORAL EVERY 4 HOURS PRN
Qty: 42 TABLET | Refills: 0 | Status: SHIPPED | OUTPATIENT
Start: 2023-05-24

## 2023-05-24 NOTE — PATIENT INSTRUCTIONS
Sutures removed in office.  Patient educated on incision care.  Please keep incision clean and dry.  Do not soak or submerge in water until incision is fully healed.  Do not apply creams or lotions over the incision. Continue icing and elevation as needed to help with pain and swelling.  Ice up to 3 or 4 times daily for no longer than 15 to 20 minutes at a time.    Patient advised on return to carpal tunnel brace nightly and with activity.  Carpal tunnel brace provided in office today. Avoid repetitive ROM of the hand or wrist.  No heavy lifting, pushing, or pulling until incision is fully healed.  Home exercises provided today.     Follow-up in 4 weeks. No imaging.  Please call with questions or concerns.

## 2023-05-24 NOTE — PROGRESS NOTES
"Chief Complaint  Follow-up and Pain of the Left Wrist and Suture / Staple Removal    Subjective      Jada Butler presents to [facility] for 2-week follow-up of left carpal tunnel release with Dr. Zeng on 5/11/2023.  Patient states that she is still having a lot of pain and needs a refill.  She reports that her carpal tunnel symptoms have improved from prior to surgery, however she is still having some mild symptoms.    Objective   Allergies   Allergen Reactions   • Amoxicillin-Pot Clavulanate GI Intolerance   • Meloxicam Unknown - High Severity     mobic-body aches       Vital Signs:   Pulse 93   Ht 160 cm (63\")   Wt 78.5 kg (173 lb)   SpO2 95%   BMI 30.65 kg/m²       Physical Exam    Constitutional: Awake, alert. Well nourished appearance.    Integumentary: Warm, dry, intact. No obvious rashes.    HENT: Atraumatic, normocephalic.   Respiratory: Non labored respirations .   Cardiovascular: Intact peripheral pulses.    Psychiatric: Normal mood and affect. A&O X3    Ortho Exam  Left wrist: Bruising noted to the wrist.  Incision is healing appropriately and is well approximated.  Tender to touch.  No erythema or drainage from the incision.  She has full sensation in all of her fingers.  Capillary refill time is brisk.  She is able to make a tight fist.  Thumb to finger opposition is intact.  Full range of motion to the wrist to flexion, extension, supination and pronation.    Imaging Results (Most Recent)     None                    Assessment and Plan   Problem List Items Addressed This Visit        Musculoskeletal and Injuries    Aftercare following shoulder arthroscopy, left, SAD and DC  - Primary       Follow Up   Return in about 4 weeks (around 6/21/2023).    Patient is a non-smoker, did not discuss options for smoking cessation.     Social History     Socioeconomic History   • Marital status:    Tobacco Use   • Smoking status: Former   • Smokeless tobacco: Never "   Vaping Use   • Vaping Use: Never used   Substance and Sexual Activity   • Alcohol use: Yes     Comment: Ocassionally   • Drug use: Not Currently     Types: Marijuana     Comment: occasionally   • Sexual activity: Not Currently     Birth control/protection: Surgical, Hysterectomy       Patient Instructions   Sutures removed in office.  Patient educated on incision care.  Please keep incision clean and dry.  Do not soak or submerge in water until incision is fully healed.  Do not apply creams or lotions over the incision. Continue icing and elevation as needed to help with pain and swelling.  Ice up to 3 or 4 times daily for no longer than 15 to 20 minutes at a time.    Patient advised on return to carpal tunnel brace nightly and with activity.  Carpal tunnel brace provided in office today. Avoid repetitive ROM of the hand or wrist.  No heavy lifting, pushing, or pulling until incision is fully healed.  Home exercises provided today.     Follow-up in 4 weeks. No imaging.  Please call with questions or concerns.    Patient was given instructions and counseling regarding her condition or for health maintenance advice. Please see specific information pulled into the AVS if appropriate.

## 2023-05-24 NOTE — TELEPHONE ENCOUNTER
Patient seen in office today with Gail Garner.  Requesting refill of pain medication.  [facility] pharmacy, [place].

## 2023-05-31 ENCOUNTER — TRANSCRIBE ORDERS (OUTPATIENT)
Dept: ADMINISTRATIVE | Facility: HOSPITAL | Age: 68
End: 2023-05-31

## 2023-05-31 DIAGNOSIS — Z12.31 VISIT FOR SCREENING MAMMOGRAM: Primary | ICD-10-CM

## 2023-06-02 ENCOUNTER — HOSPITAL ENCOUNTER (OUTPATIENT)
Dept: MAMMOGRAPHY | Facility: HOSPITAL | Age: 68
Discharge: HOME | End: 2023-06-02
Admitting: NURSE PRACTITIONER

## 2023-06-02 DIAGNOSIS — Z12.31 VISIT FOR SCREENING MAMMOGRAM: ICD-10-CM

## 2023-06-02 PROCEDURE — 77063 BREAST TOMOSYNTHESIS BI: CPT

## 2023-06-02 PROCEDURE — 77067 SCR MAMMO BI INCL CAD: CPT

## 2023-06-15 ENCOUNTER — TELEPHONE (OUTPATIENT)
Dept: ORTHOPEDIC SURGERY | Facility: CLINIC | Age: 68
End: 2023-06-15

## 2023-06-15 DIAGNOSIS — G56.02 CARPAL TUNNEL SYNDROME OF LEFT WRIST: ICD-10-CM

## 2023-06-15 RX ORDER — HYDROCODONE BITARTRATE AND ACETAMINOPHEN 7.5; 325 MG/1; MG/1
1 TABLET ORAL EVERY 4 HOURS PRN
Qty: 42 TABLET | Refills: 0 | Status: CANCELLED | OUTPATIENT
Start: 2023-06-15

## 2023-06-15 NOTE — TELEPHONE ENCOUNTER
Caller: Carrie Jada M    Relationship: Self    Best call back number:     Requested Prescriptions:   Requested Prescriptions     Pending Prescriptions Disp Refills    HYDROcodone-acetaminophen (Norco) 7.5-325 MG per tablet 42 tablet 0     Sig: Take 1 tablet by mouth Every 4 (Four) Hours As Needed for Moderate Pain.        Pharmacy where request should be sent: [address] [phone] [facility] [phone]      Last office visit with prescribing clinician: 5/1/2023   Last telemedicine visit with prescribing clinician: Visit date not found   Next office visit with prescribing clinician: Visit date not found     Additional details provided by patient: THE PATIENT HAS BEEN HAVING A LOT OF PAIN FOR THE LAST 2 DAYS. SHE WOULD LIKE DR BEEN TO REFILL HER HYDROCODONE RX.    Does the patient have less than a 3 day supply:  [x] Yes  [] No    Would you like a call back once the refill request has been completed: [x] Yes [] No    If the office needs to give you a call back, can they leave a voicemail: [x] Yes [] No    Genaro Babcock Rep   06/15/23 13:08 EDT

## 2023-06-16 NOTE — TELEPHONE ENCOUNTER
Per Dr. Zeng - pain should be getting better at this point, may be over using her hand. Take it easy over the weekend and see how it feels. If not better can come in for an appointment. Per patient has an appointment next week.

## 2023-06-20 ENCOUNTER — TELEPHONE (OUTPATIENT)
Dept: ORTHOPEDIC SURGERY | Facility: CLINIC | Age: 68
End: 2023-06-20

## 2023-06-20 NOTE — TELEPHONE ENCOUNTER
Provider:  LURDES ALMEIDA  Caller:  HUNTER BOND  Relationship to Patient: SELF  Pharmacy:  [facility] [phone]  Phone Number:  [phone]  Reason for Call: PATIENT WAS SEEN YESTERDAY AND SAYS A PRESCRIPTION CREAM WAS SUPPOSED TO BE CALLED TO HER PHARMACY. IT IS NOT THERE. PLEASE ADVISE.

## 2023-06-21 NOTE — TELEPHONE ENCOUNTER
Dr. Zeng had left the office before I could get his signature on 6-.  This will be signed and sent in today.

## 2023-06-21 NOTE — TELEPHONE ENCOUNTER
The cream is a compound that is specially ordered from [facility]. She will receive a call regarding shipment of this medicine within a day or two. If she doesn't hear anything by Friday, she can call [phone] which is the direct line for [facility].

## 2023-07-25 ENCOUNTER — TREATMENT (OUTPATIENT)
Dept: PHYSICAL THERAPY | Facility: CLINIC | Age: 68
End: 2023-07-25
Payer: MEDICARE

## 2023-07-25 DIAGNOSIS — Z98.890 S/P CARPAL TUNNEL RELEASE: Primary | ICD-10-CM

## 2023-07-25 DIAGNOSIS — R20.2 NUMBNESS AND TINGLING IN LEFT HAND: ICD-10-CM

## 2023-07-25 DIAGNOSIS — M25.532 LEFT WRIST PAIN: ICD-10-CM

## 2023-07-25 DIAGNOSIS — R20.0 NUMBNESS AND TINGLING IN LEFT HAND: ICD-10-CM

## 2023-07-25 DIAGNOSIS — M79.642 PAIN IN LEFT HAND: ICD-10-CM

## 2023-07-25 PROCEDURE — 97530 THERAPEUTIC ACTIVITIES: CPT | Performed by: OCCUPATIONAL THERAPIST

## 2023-07-25 PROCEDURE — 97110 THERAPEUTIC EXERCISES: CPT | Performed by: OCCUPATIONAL THERAPIST

## 2023-07-25 NOTE — PROGRESS NOTES
Occupational Therapy Daily Treatment Note  Romi OT: [address] [place] [address]      Patient: Jada Butler   : 1955  Diagnosis/ICD-10 Code:  S/P carpal tunnel release [Z98.890]  Referring practitioner: LURDES Montgomery  Date of Initial Visit: Type: THERAPY  Noted: 2023  Today's Date: 2023  Patient seen for 7 sessions             Subjective   Jada Butler reports: No current pain but pt does report burning at incision site.    Objective     See Exercise, Manual, and Modality Logs for complete treatment.       Assessment/Plan  Pt tolerated treatment well this date with no c/o increased pain.  Progress per Plan of Care           Timed:  Therapeutic Exercise:    10     mins  36750;     Neuromuscular Elly:    4    mins  55309;    Therapeutic Activity:     9     mins  66170;       Timed Treatment:   23   mins   Total Treatment:     30   mins        Jhony Madsen OT  Occupational Therapist  [place] License: [id number]  NPI: [id number]

## 2023-07-26 ENCOUNTER — TREATMENT (OUTPATIENT)
Dept: PHYSICAL THERAPY | Facility: CLINIC | Age: 68
End: 2023-07-26
Payer: MEDICARE

## 2023-07-26 DIAGNOSIS — Z98.890 S/P CARPAL TUNNEL RELEASE: Primary | ICD-10-CM

## 2023-07-26 DIAGNOSIS — R20.2 NUMBNESS AND TINGLING IN LEFT HAND: ICD-10-CM

## 2023-07-26 DIAGNOSIS — M79.642 PAIN IN LEFT HAND: ICD-10-CM

## 2023-07-26 DIAGNOSIS — M25.532 LEFT WRIST PAIN: ICD-10-CM

## 2023-07-26 DIAGNOSIS — R20.0 NUMBNESS AND TINGLING IN LEFT HAND: ICD-10-CM

## 2023-07-26 NOTE — PROGRESS NOTES
OT Re-evaluation/Progress Note  Romi  OT: [address]  [place] [address]    Patient: Jada Butler   : 1955  Diagnosis/ICD-10 Code:  S/P carpal tunnel release [Z98.890]  Referring practitioner: LURDES Montgomery  Date of Initial Visit: Type: THERAPY  Noted: 2023  Today's Date: 2023  Patient seen for 8 sessions      Subjective:   Subjective Questionnaire: QuickDASH:   Clinical Progress: improved  Home Program Compliance: Yes  Treatment has included: therapeutic exercise, neuromuscular re-education, manual therapy, and therapeutic activity    Subjective   Objective          Observations     Additional Wrist/Hand Observation Details  Well healed incision to L hand. No adhesions noted.    Tenderness     Additional Tenderness Details  Mild over incision.    Neurological Testing     Additional Neurological Details  Pt scored 3.61 on monofilament testing middle and small digits left hand. Pt scored WNL on remaining digits.    Active Range of Motion     Left Elbow   Normal active range of motion    Left Wrist   Normal active range of motion    Additional Active Range of Motion Details  Pt able to make composite fist with full opposition to base of small finger.    Strength/Myotome Testing     Left Wrist/Hand      (2nd hand position)     Trial 1: 30 lbs    Trial 2: 20 lbs    Trial 3: 23 lbs    Average: 24.33 lbs    Right Wrist/Hand      (2nd hand position)     Trial 1: 30 lbs    Trial 2: 35 lbs    Trial 3: 30 lbs    Average: 31.67 lbs    Additional Strength Details   strength to tolerance.    Tests   Cervical     Left   Negative ULTT1.     Left Elbow   Negative Tinel's sign (cubital tunnel).     Assessment & Plan     Assessment  Impairments: abnormal or restricted ROM, activity intolerance, impaired physical strength, lacks appropriate home exercise program and pain with function  Functional Limitations: carrying objects, lifting, sleeping, pulling, pushing and uncomfortable  because of pain  Assessment details: Pt displays improved strength/sensation and decreased pain this date. Pt continues with pain with fxl use and decreased sensation that is limiting. Pt met 3/3 STGs.  Prognosis: good    Goals  Plan Goals: The patient complains of pain in the L hand/wrist.  LTG 1  12 weeks:  The patient will report a pain rating of 1/10 in order to improve sleep quality and tolerance to performance of activities of daily living.   Status: New  STG 1  8 weeks:  The patient will report a pain rating of 3/10.  Status:  Met    2.  The patient has limited sensation of the L hand.  LTG 2  12 weeks:  The patient will score WNL on monofilament testing of the L hand to improve ability to carry and manipulate objects.  Status: New    3.  The patient has limited strength of the L hand.  LTG 3  12 weeks:  The patient will demonstrate 25 lbs of  strength in order to grasp and manipulate objects.  Status: New  STG 3  8 weeks:  The patient will demonstrate 20 lbs of  strength.  Status:  Met    4.  Carrying, moving, and handling objects functional limitation.  LTG 4  12 weeks:  The patient will demonstrate 1-19 % limitation by achieving a score of 19/55 on the Quick DASH.  Status: New  STG 4  8 weeks:  The patient will demonstrate 40-59 % limitation by achieving a score of 36/55 on the Quick DASH.  Status:  Met         Plan  Planned modality interventions: cryotherapy and thermotherapy (hydrocollator packs)  Planned therapy interventions: body mechanics training, fine motor coordination training, flexibility, functional ROM exercises, home exercise program, joint mobilization, manual therapy, neuromuscular re-education, postural training, soft tissue mobilization, strengthening, stretching and therapeutic activities  Frequency: 2x week  Duration in weeks: 12  Treatment plan discussed with: patient    Progress toward previous goals: Partially Met      Recommendations: Continue as planned  Timeframe: 1  month  Prognosis to achieve goals: good    OT SIGNATURE: Jhony Madsen OT   DATE TREATMENT INITIATED: Type: THERAPY  Noted: 6/28/2023  [place] License: [id number]    Based upon review of the patient's progress and continued therapy plan, it is my medical opinion that Jada Butler should continue occupational therapy treatment at [facility]  [address] [place] [address] [address] [place] [id number] [phone].    Signature: __________________________________  Gail Garner APRN MD NPI: [id number]  Timed:    Therapeutic Exercise:    20     mins  66488;     Neuromuscular Elly:    8    mins  26385;    Therapeutic Activity:     10     mins  23898;       Timed Treatment:   38   mins   Total Treatment:     38   mins  Please sign and return via fax to [phone]  . Thank you, [facility].

## 2023-07-31 ENCOUNTER — TREATMENT (OUTPATIENT)
Dept: PHYSICAL THERAPY | Facility: CLINIC | Age: 68
End: 2023-07-31
Payer: MEDICARE

## 2023-07-31 ENCOUNTER — OFFICE VISIT (OUTPATIENT)
Dept: ORTHOPEDIC SURGERY | Facility: CLINIC | Age: 68
End: 2023-07-31
Payer: MEDICARE

## 2023-07-31 VITALS
HEIGHT: 63 IN | OXYGEN SATURATION: 99 % | BODY MASS INDEX: 31.71 KG/M2 | DIASTOLIC BLOOD PRESSURE: 80 MMHG | SYSTOLIC BLOOD PRESSURE: 124 MMHG | HEART RATE: 84 BPM | WEIGHT: 179 LBS

## 2023-07-31 DIAGNOSIS — R20.0 NUMBNESS AND TINGLING IN LEFT HAND: ICD-10-CM

## 2023-07-31 DIAGNOSIS — R20.2 NUMBNESS AND TINGLING IN LEFT HAND: ICD-10-CM

## 2023-07-31 DIAGNOSIS — Z47.89 AFTERCARE FOLLOWING SURGERY OF THE MUSCULOSKELETAL SYSTEM: Primary | ICD-10-CM

## 2023-07-31 DIAGNOSIS — Z98.890 S/P CARPAL TUNNEL RELEASE: Primary | ICD-10-CM

## 2023-07-31 DIAGNOSIS — M25.532 LEFT WRIST PAIN: ICD-10-CM

## 2023-07-31 DIAGNOSIS — M79.642 PAIN IN LEFT HAND: ICD-10-CM

## 2023-08-02 ENCOUNTER — TREATMENT (OUTPATIENT)
Dept: PHYSICAL THERAPY | Facility: CLINIC | Age: 68
End: 2023-08-02
Payer: MEDICARE

## 2023-08-02 DIAGNOSIS — R20.2 NUMBNESS AND TINGLING IN LEFT HAND: ICD-10-CM

## 2023-08-02 DIAGNOSIS — R20.0 NUMBNESS AND TINGLING IN LEFT HAND: ICD-10-CM

## 2023-08-02 DIAGNOSIS — M25.532 LEFT WRIST PAIN: ICD-10-CM

## 2023-08-02 DIAGNOSIS — M79.642 PAIN IN LEFT HAND: ICD-10-CM

## 2023-08-02 DIAGNOSIS — Z98.890 S/P CARPAL TUNNEL RELEASE: Primary | ICD-10-CM

## 2023-08-07 ENCOUNTER — TREATMENT (OUTPATIENT)
Dept: PHYSICAL THERAPY | Facility: CLINIC | Age: 68
End: 2023-08-07
Payer: MEDICARE

## 2023-08-07 DIAGNOSIS — R20.0 NUMBNESS AND TINGLING IN LEFT HAND: ICD-10-CM

## 2023-08-07 DIAGNOSIS — M79.642 PAIN IN LEFT HAND: ICD-10-CM

## 2023-08-07 DIAGNOSIS — Z98.890 S/P CARPAL TUNNEL RELEASE: Primary | ICD-10-CM

## 2023-08-07 DIAGNOSIS — R20.2 NUMBNESS AND TINGLING IN LEFT HAND: ICD-10-CM

## 2023-08-07 DIAGNOSIS — M25.532 LEFT WRIST PAIN: ICD-10-CM

## 2023-08-07 PROCEDURE — 97530 THERAPEUTIC ACTIVITIES: CPT | Performed by: OCCUPATIONAL THERAPIST

## 2023-08-07 PROCEDURE — 97112 NEUROMUSCULAR REEDUCATION: CPT | Performed by: OCCUPATIONAL THERAPIST

## 2023-08-07 PROCEDURE — 97110 THERAPEUTIC EXERCISES: CPT | Performed by: OCCUPATIONAL THERAPIST

## 2023-08-07 NOTE — PROGRESS NOTES
Occupational Therapy Daily Treatment Note  Romi OT: [address] [place] [address]      Patient: Jada Butler   : 1955  Diagnosis/ICD-10 Code:  S/P carpal tunnel release [Z98.890]  Referring practitioner: No ref. provider found  Date of Initial Visit: Type: THERAPY  Noted: 2023  Today's Date: 2023  Patient seen for 11 sessions             Subjective   Jada Butler reports: No current pain.    Objective     See Exercise, Manual, and Modality Logs for complete treatment.       Assessment/Plan  Pt tolerated increase in reps with  strengthening well this date. Pt tolerated well.  Progress per Plan of Care           Timed:  Therapeutic Exercise:    20     mins  86238;     Neuromuscular Elly:    8    mins  07614;    Therapeutic Activity:     10     mins  04369;       Timed Treatment:   38   mins   Total Treatment:     38   mins        Jhony Madsen OT  Occupational Therapist  [place] License: [id number]  NPI: [id number]

## 2023-08-09 ENCOUNTER — TREATMENT (OUTPATIENT)
Dept: PHYSICAL THERAPY | Facility: CLINIC | Age: 68
End: 2023-08-09
Payer: MEDICARE

## 2023-08-09 DIAGNOSIS — R20.0 NUMBNESS AND TINGLING IN LEFT HAND: ICD-10-CM

## 2023-08-09 DIAGNOSIS — M25.532 LEFT WRIST PAIN: ICD-10-CM

## 2023-08-09 DIAGNOSIS — Z98.890 S/P CARPAL TUNNEL RELEASE: Primary | ICD-10-CM

## 2023-08-09 DIAGNOSIS — R20.2 NUMBNESS AND TINGLING IN LEFT HAND: ICD-10-CM

## 2023-08-09 DIAGNOSIS — M79.642 PAIN IN LEFT HAND: ICD-10-CM

## 2023-08-09 NOTE — PROGRESS NOTES
Occupational Therapy Daily Treatment/Discharge Note  Romi OT: [address] [place] [address]      Patient: Jada Butler   : 1955  Diagnosis/ICD-10 Code:  S/P carpal tunnel release [Z98.890]  Referring practitioner: LURDES Montgomery  Date of Initial Visit: Type: THERAPY  Noted: 2023  Today's Date: 2023  Patient seen for 12 sessions         QuickDASH:     Subjective   Jada Butler reports: 5/10 pain L hand. Pt reports it has been bothering her the last 2-3 days.    Objective          Observations     Additional Wrist/Hand Observation Details  Well healed incision to L hand. No adhesions noted.    Tenderness     Additional Tenderness Details  Mild over incision.    Neurological Testing     Additional Neurological Details  Pt scored WNL on monofilament testing but reports some residual numbness.    Active Range of Motion     Left Elbow   Normal active range of motion    Left Wrist   Normal active range of motion    Additional Active Range of Motion Details  Pt able to make composite fist with full opposition to base of small finger.    Strength/Myotome Testing     Left Wrist/Hand      (2nd hand position)     Trial 1: 34 lbs    Trial 2: 40 lbs    Trial 3: 40 lbs    Average: 38 lbs    Right Wrist/Hand      (2nd hand position)     Trial 1: 30 lbs    Trial 2: 35 lbs    Trial 3: 30 lbs    Average: 31.67 lbs    Additional Strength Details   strength to tolerance.    Tests   Cervical     Left   Negative ULTT1.     Left Elbow   Negative Tinel's sign (cubital tunnel).       See Exercise, Manual, and Modality Logs for complete treatment.       Assessment & Plan     Assessment  Impairments: abnormal or restricted ROM, activity intolerance, impaired physical strength, lacks appropriate home exercise program and pain with function  Functional Limitations: carrying objects, lifting, sleeping, pulling, pushing and uncomfortable because of pain  Assessment details: Pt displays  improved strength/sensation this date. Pt continues with pain with fxl use and reports of decreased sensation that is limiting. Pt met 2/3 STGs and 2/4 LTGs. OT recommends discharge to [facility] at this time. Pt agreeable.  Prognosis: good    Goals  Plan Goals: The patient complains of pain in the L hand/wrist.  LTG 1  12 weeks:  The patient will report a pain rating of 1/10 in order to improve sleep quality and tolerance to performance of activities of daily living.   Status: Not met  STG 1  8 weeks:  The patient will report a pain rating of 3/10.  Status:  Not met    2.  The patient has limited sensation of the L hand.  LTG 2  12 weeks:  The patient will score WNL on monofilament testing of the L hand to improve ability to carry and manipulate objects.  Status: Met    3.  The patient has limited strength of the L hand.  LTG 3  12 weeks:  The patient will demonstrate 25 lbs of  strength in order to grasp and manipulate objects.  Status: Met  STG 3  8 weeks:  The patient will demonstrate 20 lbs of  strength.  Status:  Met    4.  Carrying, moving, and handling objects functional limitation.  LTG 4  12 weeks:  The patient will demonstrate 1-19 % limitation by achieving a score of 19/55 on the Quick DASH.  Status: Not met  STG 4  8 weeks:  The patient will demonstrate 40-59 % limitation by achieving a score of 36/55 on the Quick DASH.  Status:  Met         Plan  Planned modality interventions: cryotherapy and thermotherapy (hydrocollator packs)  Planned therapy interventions: body mechanics training, fine motor coordination training, flexibility, functional ROM exercises, home exercise program, joint mobilization, manual therapy, neuromuscular re-education, postural training, soft tissue mobilization, strengthening, stretching and therapeutic activities  Frequency: 2x week  Duration in weeks: 12  Treatment plan discussed with: patient      Other Discharge to [facility] at this time.           Timed:  Therapeutic Exercise:     20     mins  98084;     Neuromuscular Elly:    8    mins  31133;    Therapeutic Activity:     10     mins  50071;       Timed Treatment:   38   mins   Total Treatment:     38   mins        Jhony Madsen OT  Occupational Therapist  [place] License: [id number]  NPI: [id number]

## 2023-09-05 ENCOUNTER — OFFICE VISIT (OUTPATIENT)
Dept: NEUROLOGY | Facility: CLINIC | Age: 68
End: 2023-09-05
Payer: MEDICARE

## 2023-09-05 VITALS
SYSTOLIC BLOOD PRESSURE: 142 MMHG | HEART RATE: 83 BPM | WEIGHT: 182 LBS | HEIGHT: 63 IN | DIASTOLIC BLOOD PRESSURE: 71 MMHG | BODY MASS INDEX: 32.25 KG/M2

## 2023-09-05 DIAGNOSIS — G25.0 ESSENTIAL TREMOR: Primary | ICD-10-CM

## 2023-09-05 DIAGNOSIS — M77.12 LATERAL EPICONDYLITIS OF LEFT ELBOW: ICD-10-CM

## 2023-09-05 DIAGNOSIS — G25.2 DYSTONIC TREMOR: ICD-10-CM

## 2023-09-05 PROCEDURE — 1159F MED LIST DOCD IN RCRD: CPT | Performed by: PSYCHIATRY & NEUROLOGY

## 2023-09-05 PROCEDURE — 99204 OFFICE O/P NEW MOD 45 MIN: CPT | Performed by: PSYCHIATRY & NEUROLOGY

## 2023-09-05 PROCEDURE — 1160F RVW MEDS BY RX/DR IN RCRD: CPT | Performed by: PSYCHIATRY & NEUROLOGY

## 2023-09-05 RX ORDER — CIPROFLOXACIN 500 MG/1
1 TABLET, FILM COATED ORAL EVERY 12 HOURS SCHEDULED
COMMUNITY
Start: 2023-08-30

## 2023-09-05 RX ORDER — PROPRANOLOL HYDROCHLORIDE 40 MG/1
TABLET ORAL
Qty: 150 TABLET | Refills: 6 | Status: SHIPPED | OUTPATIENT
Start: 2023-09-05

## 2023-09-05 RX ORDER — PHENAZOPYRIDINE HYDROCHLORIDE 100 MG/1
1 TABLET, FILM COATED ORAL 3 TIMES DAILY
COMMUNITY
Start: 2023-08-30

## 2023-09-05 NOTE — ASSESSMENT & PLAN NOTE
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  Now s/p prostate biopsy  Still having some hematospermia  JORGE 13    Patient ultimately agreed to proceed to the operating room on 8/11/2022  Robotic prostatectomy performed  Patient had a difficult apical dissection and anastomosis which required bladder neck reconstruction on the left side  Hospital stay was uncomplicated  Patient did note some pericatheter urethral leak with spasms  Unfortunately, he was readmitted August 21st with fevers and a Klebsiella UTI  Antibiotic therapy was administered, inpatient cystogram performed which was negative, catheter removed  Patient is now 10 months out from his prostatectomy  He has had a difficult time with continence return  Was diligent with PFPT, continence support groups  It appears that his continence has somewhat plateaued  Ongoing incontinence has created some significant mental distress for the patient and he is now ready to consider advanced treatment  With daily Cialis and vacuum erection device, has not had any return of erectile function  On demand Cialis added last visit  Patient has tried up to 80 mg without significant response      6/1/23  7:00 AM     PSA Ultrasensitive, Post Prostatectomy 0 00 - 4 00 ng/mL <0 01         Past Medical History:     Past Medical History:   Diagnosis Date   • Anxiety    • Cancer ([facility] [address] ) 6/15/22    Prostatic   • GERD (gastroesophageal reflux disease) We will order an MRI of the brain requested as well as to determine if there is any abnormalities causing her to have asymmetric tremor.   2001   • HL (hearing loss)     B/L   • Hyperlipidemia    • Hypertension    • Obesity    • Prostate cancer ([address] )        PAST SURGICAL HISTORY:     Past Surgical History:   Procedure Laterality Date   • COLONOSCOPY     • CT CYSTOGRAM  8/24/2022   • CA BX PROSTATE STRTCTC SATURATION SAMPLING IMG GID N/A 06/08/2022    Procedure: TRANSPERINEAL MRI FUSION BIOPSY PROSTATE;  Surgeon: Ben Lutz MD;  Location: [facility];  Service: Urology   • CA LAPS SURG [id number] RPBIC RAD W/NRV SPARING ROBOT N/A 8/11/2022    Procedure: ROBOTIC PROSTATECTOMY RADICAL , BLADDER NECK RECONSTRUCTION;  Surgeon: Tessa Ahmadi MD;  Location: [facility];  Service: Urology   • VARICOSE VEIN SURGERY     • VASECTOMY     • WISDOM TOOTH EXTRACTION         CURRENT MEDICATIONS:     Current Outpatient Medications   Medication Sig Dispense Refill   • albuterol (Ventolin HFA) 90 mcg/act inhaler Inhale 2 puffs every 6 (six) hours as needed for wheezing 18 g 5   • aspirin (ECOTRIN LOW STRENGTH) 81 mg EC tablet Take 81 mg by mouth daily     • irbesartan (AVAPRO) 150 mg tablet Take 1 tablet (150 mg total) by mouth daily at bedtime 90 tablet 3   • irbesartan (AVAPRO) 75 mg tablet Take 1 tablet (75 mg total) by mouth daily 90 tablet 3   • rosuvastatin (CRESTOR) 5 mg tablet Take 1 tablet (5 mg total) by mouth every other day 90 tablet 3   • tadalafil (CIALIS) 5 MG tablet Take 1 tablet (5 mg total) by mouth in the morning 90 tablet 3   • docusate sodium (COLACE) 100 mg capsule Take 1 capsule (100 mg total) by mouth 2 (two) times a day (Patient not taking: Reported on 11/30/2022) 30 capsule 0   • sildenafil (REVATIO) 20 mg tablet Take 1 tablet (20 mg total) by mouth once for 1 dose On empty stomach, one hour before intercourse (Patient not taking: Reported on 6/16/2023) 30 tablet 3     No current facility-administered medications for this visit         ALLERGIES:   No Known Allergies    SOCIAL HISTORY:     Social History     Socioeconomic History • Marital status: /Civil Union     Spouse name: None   • Number of children: None   • Years of education: None   • Highest education level: None   Occupational History   • None   Tobacco Use   • Smoking status: Never   • Smokeless tobacco: Never   Vaping Use   • Vaping Use: Never used   Substance and Sexual Activity   • Alcohol use: Yes     Alcohol/week: 2 0 standard drinks of alcohol     Types: 2 Standard drinks or equivalent per week     Comment: 1 x month   • Drug use: Not Currently     Comment: College   • Sexual activity: Yes     Partners: Female     Birth control/protection: Male Sterilization   Other Topics Concern   • None   Social History Narrative   • None     Social Determinants of Health     Financial Resource Strain: Not on file   Food Insecurity: No Food Insecurity (2022)    Hunger Vital Sign    • Worried About Running Out of Food in the Last Year: Never true    • Ran Out of Food in the Last Year: Never true   Transportation Needs: No Transportation Needs (2022)    PRAPARE - Transportation    • Lack of Transportation (Medical): No    • Lack of Transportation (Non-Medical): No   Physical Activity: Not on file   Stress: Not on file   Social Connections: Not on file   Intimate Partner Violence: Not on file   Housing Stability: Low Risk  (2022)    Housing Stability Vital Sign    • Unable to Pay for Housing in the Last Year: No    • Number of Places Lived in the Last Year: 1    • Unstable Housing in the Last Year: No       SOCIAL HISTORY:     Family History   Problem Relation Age of Onset   • Sickle cell trait Mother    • Hypertension Mother    • Stroke Mother            • Hypertension Father    • Heart disease Father    • Cardiomyopathy Brother    • Sickle cell trait Maternal Grandmother    • Sickle cell trait Maternal Grandfather    • Hypertension Paternal Grandfather        REVIEW OF SYSTEMS:     Review of Systems   Constitutional: Negative  Respiratory: Negative  "  Cardiovascular: Negative  Gastrointestinal: Negative  Genitourinary: Positive for enuresis (Moderate and stable)  Negative for scrotal swelling  Musculoskeletal: Negative  Skin: Negative  Psychiatric/Behavioral: Negative  PHYSICAL EXAM:     /80 (BP Location: Right arm, Patient Position: Sitting, Cuff Size: Adult)   Pulse 78   Ht 5' 9\" (1 753 m)   Wt 109 kg (240 lb 14 4 oz)   SpO2 97%   BMI 35 57 kg/m²     General:  Healthy appearing male in no acute distress  They have a normal affect  There is not appear to be any gross neurologic defects or abnormalities  HEENT:  Normocephalic, atraumatic  Neck is supple without any palpable lymphadenopathy  Cardiovascular:  Patient has normal palpable distal radial pulses  There is no significant peripheral edema  No JVD is noted  Respiratory:  Patient has unlabored respirations  There is no audible wheeze or rhonchi  Abdomen:  Abdomen is with healing robotic surgical scars  Abdomen is soft and nontender  There is no tympany  Inguinal and umbilical hernia are not appreciated  Musculoskeletal:  Patient does not have significant CVA tenderness in the  flank with palpation or percussion  They full range of motion in all 4 extremities  Strength in all 4 extremities appears congruent  Patient is able to ambulate without assistance or difficulty  Dermatologic:  Patient has no skin abnormalities or rashes        LABS:     CBC:   Lab Results   Component Value Date    WBC 9 82 2022    HGB 13 0 2022    HCT 38 8 2022    MCV 89 2022     2022       BMP:   Lab Results   Component Value Date    CALCIUM 9 1 2022    K 4 2 2022    CO2 23 2022     2022    BUN 22 2022    CREATININE 0 96 2022  7:00 AM     PSA Ultrasensitive, Post Prostatectomy 0 00 - 4 00 ng/mL <0 01       IMAGIN/13/22  MULTIPARAMETRIC MRI OF THE PROSTATE WITH AND WITHOUT CONTRAST-WITH " 3-D POSTPROCESSING      INDICATION:   R97 20: Elevated prostate specific antigen (PSA)  N40 3: Nodular prostate with lower urinary tract symptoms  N13 8: Other obstructive and reflux uropathy      COMPARISON: None     PSA LEVEL: 2 6 ng/ml  1/29/2022  PRIOR BIOPSY DATE: Unknown  BIOPSY RESULTS: Unknown      TECHNIQUE: The following pulse sequences were obtained:  Small field-of-view axial T1-weighted and multiplanar T2-weighted images; DWI axial and ADC map; large field of view axial T2 weighted images; T1w in-phase and opposed-phase axials of entire pelvis   and dynamic 3D T1w axial before and during IV contrast injection  Imaging performed on 3 0T MRI      CONTRAST:  Gadobutrol (Gadavist) 9 mL of Gadobutrol injection (SINGLE-DOSE)     TECHNICAL LIMITATIONS: None      FINDINGS:     PROSTATE:     Size: 5 1 x 4 4 x 4 6 cm = 49 7 cc  Post-biopsy hemorrhage:  None  Central gland enlargement (BPH): Mild      Focal lesions -      Lesion: 1       Size: 1 2 x 0 8 x 0 9 cm, 0 49 cc  Location: Right posterior medial peripheral zone at the apex       T2-weighted images: Score 3: Heterogeneous signal or noncircumscribed, rounded, moderate hypointensity; includes others that do not qualify as 2, 4 or 5  Diffusion-weighted images: Score 4: Focal markedly hypointense on ADC and markedly hyperintense on high b-value DWI; less than 1 5 cm in greatest dimension  Dynamic post-contrast images: (-) Lesion that does not enhance early compared to the surrounding prostate or enhances diffusely so that the margins of the enhancing area do not correspond to a finding on T2-weighted images and/or DWI  PI-RADS Assessment Category: 4, High (clinically significant cancer is likely to be present)    Extra-prostatic extension (EPE): Abuts capsule without visualized EPE      SEMINAL VESICLES: Unremarkable     Note: Clinically significant cancer is defined on pathology/histology as [place] score greater than or equal to 7, and/or volume of greater than or equal to 0 5 mL, and/or extraprostatic extension      URINARY BLADDER: Unremarkable      LYMPH NODES: No pelvic lymphadenopathy      BONES: No suspicious osseous lesion      There are bilateral fat-containing inguinal hernias  There is diverticulosis coli      IMPRESSION:     1  PI-RADSv2 1 Category 4 -High (clinically significant cancer is likely to be present)  Right posterior medial peripheral zone at the apex      2  No extraprostatic tumor, seminal vesicle invasion, pelvic lymphadenopathy, or pelvic osseous metastatic disease      3  Calculated prostate volume of 49 7 cc  PATHOLOGY:     6/8/22  Final Diagnosis   A  Prostate, RIGHT ANTERIOR MEDIAL needle biopsy:    -Benign prostatic tissue          B  Prostate, RIGHT BASE, needle Biopsy:   - Prostatic adenocarcinoma, confirmed by triple stain   -Pmaela grade 3 +4 = score  7  -Percentage of Grade 4:  10%  -Tumor involves two submitted cores  -Tumor  discontinuously involves approximately 10% , 80% of  each core biopsy  - Perineural invasion: Not identified        C  Prostate, RIGHT POSTERIOR MEDIAL, needle biopsy:    -Benign prostatic tissue          D  Prostate, LEFT BASE, needle biopsy:    -Prostate tissue with small focus of atypical glands          E  Prostate, LEFT POSTERIOR MEDIAL, needle biopsy:    -Benign prostatic tissue  Note  Triple stain shows positive staining of myoepithelial cell layer by , P63  Racemase stain is negative  Controls reacted appropriately      F  Prostate, LEFT POSTERIOR LATERAL, needle biopsy:    -Prostate tissue with small focus of atypical glands  Note  Triple stain shows focal absent staining of myoepithelial cell layer by , P63  Racemase stain is negative  Controls reacted appropriately      G  Prostate, LEFT ANTERIOR LATERAL, needle biopsy:    -Benign prostatic tissue          H  Prostate, LEFT ANTERIOR MEDIAL,  needle biopsy:    -Benign prostatic tissue          I  Prostate, RIGHT POSTERIOR LATERAL, needle Biopsy:   - Prostatic adenocarcinoma, confirmed by triple stain   -Pamela grade 3 + 3 = score  6  -Tumor involves one of two submitted cores  -Tumor involves approximately 10% of  core biopsy  - Perineural invasion: Not identified        J  Prostate, RIGHT ANTERIOR LATERAL,  needle Biopsy:   - Prostatic adenocarcinoma, confirmed by triple stain   -Pamela grade 3 +4 = score  7  -Percentage of Grade 4:  10%  -Tumor involves two submitted cores  -Tumor involves approximately 20% , 20% of  each core biopsy  - Perineural invasion: Not identified        K  Prostate, Region of Interest,  needle Biopsy:   - Prostatic adenocarcinoma, confirmed by triple stain   -Heber grade 3 +3 = score  6  -Tumor involves four of five submitted cores  -Tumor involves approximately 35% , 50%, 60%, 75%  of  each core biopsy  - Perineural invasion: Not identified     NOMOGRAM:       ASSESSMENT:     8/11/22  Final Diagnosis   A  Soft Tissue, cely-prostatic fat:  - Benign fibroadipose tissue   - Negative for carcinoma      B  Prostate, prostate and seminal vesicles:  - Prostatic adenocarcinoma, Heber score 7 (3+4), grade group 2   - Lymphovascular invasion is not identified  - Extraprostatic extension is not identified  - Inked margins, negative for carcinoma  - Seminal vesicles, negative for carcinoma  - Please see note and synoptic report  ASSESSMENT:     61 y o  male with [id number] Heber 3+4=7 (negative SM) s/p RALP 8/11/22    PLAN:     PSA is reassuring  Next check in 3months will be 13mos postop  Incontinence has reached plateau  We did discuss the option of referral for advanced male sling or urinary sphincter device at last visit and again today  Given the significant psychological distress the ongoing incontinence has had, the patient is more amenable to referral for continence surgery discussion  I will reach out to reconstructive urology for evaluation        Patient has not had any erectile function return (preop JORGE 13)  Will continue daily Cialis and vacuum erection devices for rehabilitation  On-demand medication can be utilized in addition  At the current moment, the patient's focus is less on desire for erections and function  Once continence is improved can consider advanced discussion or even intracavernosal injection teaching

## 2023-09-05 NOTE — PROGRESS NOTES
"Chief Complaint  Tremors (BUE.) and Neurologic Problem (Family HX of MS. )    Subjective          Jada Butler is a 67 y.o. female who presents to [facility] NEUROLOGY & NEUROSURGERY  History of Present Illness  67-year-old woman evaluated for left hand tremor.  She states that she has had this tremor for 7 to 8 years interferes with her activities such as playing phone and doing other activities.  The tremor happens when she is doing nothing and at times when she is doing something like carrying things around.  She states that she also has pain in her left elbow.  Saw a neurologist years ago [place] and she was told that she did not have Parkinson's disease.  She was given a medication which is most likely primidone and it made her very drowsy.  He does not have a history of asthma.  She is never taken beta-blockers in the past although she is taking antihypertensive medications.  She states that she is also to take her pulse rate and blood pressure.    She has no problems with walking.  She is not slower in her activities of daily living.    She states that she is worried that she has other problems such as multiple sclerosis since she has a family history of multiple sclerosis.  She is here today with her .  Objective   Vital Signs:   /71   Pulse 83   Ht 160 cm (62.99\")   Wt 82.6 kg (182 lb)   BMI 32.25 kg/m²     Physical Exam   She is alert, fluent, phasic, follows commands well.  Optic disc are normal bilaterally's full confrontation, EOMs full all directions gaze, facial strength is full, soft palate elevation and tongue are normal.  There is no weakness of the upper or lower extremities.  There is no significant tremor noted on finger-to-nose testing as well with Archimedes spiral or transferring water from 1 cup to the next.  She does not have any tremor noted trying to drink out of an empty cup.  Reflexes are symmetrically decreased.  On station gait she is able " to ambulate without difficulty and armswing is normal.  Turning is intact.  She is able to tiptoe, heel walk, tonny without any difficulty or any more noted with stress gait.  Armswing is normal.  Pulse rate is 75 and regular.    She has pain in her left lateral epicondyle on palpation as well as with movement.        Assessment and Plan  Diagnoses and all orders for this visit:    1. Essential tremor (Primary)  Assessment & Plan:  Essential tremor or dystonic tremor.  I discussed with her that she does not need medications unless it significantly bothers her.  She states that she wants to try medication.  I will start her on propranolol 40 mg twice a day x1 week and increase the dose by 40 mg weekly until her tremors improve or she has adverse effects.  Propranolol up to 80 mg in the morning and 120 mg in the evening.  She is to stay at the lowest dose that controls her tremor.  I discussed with her that atenolol can cause her to become dizzy, orthostatic associated with sitting to standing feeling like she is going to pass out.  Discussed with her that it can make her tired.  She states that she is going to take her blood pressure as well as her pulse rate.  She is not to increase the dose if her pulse rate is less than 60 or her blood pressure is less than 120/80.  I will see her again in 7 weeks time for follow-up.  If she is unable to tolerate propranolol I may refer her to movement disorder clinic for Botox injection for dystonic tremor.  I also showed her a video on DBS.    Orders:  -     MRI Brain Without Contrast; Future    2. Dystonic tremor  Assessment & Plan:  We will order an MRI of the brain requested as well as to determine if there is any abnormalities causing her to have asymmetric tremor.      3. Lateral epicondylitis of left elbow  Assessment & Plan:  She is seeing orthopedics.  I discussed with her that she should consider having steroid injections to her left lateral epicondyle for  relief.      Other orders  -     propranolol (INDERAL) 40 MG tablet; Titrate as directed weekly and take up to 2 tablets in the morning and 3 tablets in the evening.  Dispense: 150 tablet; Refill: 6             Total time spent with the patient and coordinating patient care was 45 minutes.    Follow Up  No follow-ups on file.  Patient was given instructions and counseling regarding her condition or for health maintenance advice. Please see specific information pulled into the AVS if appropriate.

## 2023-09-05 NOTE — ASSESSMENT & PLAN NOTE
She is seeing orthopedics.  I discussed with her that she should consider having steroid injections to her left lateral epicondyle for relief.

## 2023-09-05 NOTE — ASSESSMENT & PLAN NOTE
Essential tremor or dystonic tremor.  I discussed with her that she does not need medications unless it significantly bothers her.  She states that she wants to try medication.  I will start her on propranolol 40 mg twice a day x1 week and increase the dose by 40 mg weekly until her tremors improve or she has adverse effects.  Propranolol up to 80 mg in the morning and 120 mg in the evening.  She is to stay at the lowest dose that controls her tremor.  I discussed with her that atenolol can cause her to become dizzy, orthostatic associated with sitting to standing feeling like she is going to pass out.  Discussed with her that it can make her tired.  She states that she is going to take her blood pressure as well as her pulse rate.  She is not to increase the dose if her pulse rate is less than 60 or her blood pressure is less than 120/80.  I will see her again in 7 weeks time for follow-up.  If she is unable to tolerate propranolol I may refer her to movement disorder clinic for Botox injection for dystonic tremor.  I also showed her a video on DBS.

## 2023-10-13 ENCOUNTER — TELEPHONE (OUTPATIENT)
Dept: NEUROLOGY | Facility: CLINIC | Age: 68
End: 2023-10-13

## 2023-10-13 NOTE — TELEPHONE ENCOUNTER
Caller: OPAL    Relationship: N/A    Best call back number: [phone]     What is the best time to reach you: ANY    Who are you requesting to speak with (clinical staff, provider,  specific staff member): PROVIDER/STAFF    What was the call regarding: TELEPHONED TO ADVISE MRI SCHED. FOR IS STILL PENDING APPROVAL . SHE IS RESCHEDULING AT THIS TIME.    PLEASE CALL W/ QUESTIONS OR CONCERNS-THANK YOU

## 2023-10-20 ENCOUNTER — HOSPITAL ENCOUNTER (OUTPATIENT)
Dept: MRI IMAGING | Facility: HOSPITAL | Age: 68
Discharge: HOME | End: 2023-10-20
Admitting: PSYCHIATRY & NEUROLOGY
Payer: MEDICARE

## 2023-10-20 DIAGNOSIS — G25.0 ESSENTIAL TREMOR: ICD-10-CM

## 2023-10-20 PROCEDURE — 70551 MRI BRAIN STEM W/O DYE: CPT

## 2023-11-01 ENCOUNTER — TELEPHONE (OUTPATIENT)
Dept: NEUROLOGY | Facility: CLINIC | Age: 68
End: 2023-11-01
Payer: MEDICARE

## 2023-11-02 ENCOUNTER — TELEPHONE (OUTPATIENT)
Dept: NEUROLOGY | Facility: CLINIC | Age: 68
End: 2023-11-02

## 2023-11-03 NOTE — TELEPHONE ENCOUNTER
Spoke with pt on the phone. Let her know Dr. Farrell reviewed her MRI and said showed some white matter changes not related to her tremor and will discuss further at her next appointment. Verbalized understanding.

## 2023-11-14 ENCOUNTER — HOSPITAL ENCOUNTER (EMERGENCY)
Facility: HOSPITAL | Age: 68
Discharge: HOME | End: 2023-11-14
Attending: EMERGENCY MEDICINE | Admitting: EMERGENCY MEDICINE
Payer: MEDICARE

## 2023-11-14 ENCOUNTER — OFFICE VISIT (OUTPATIENT)
Dept: NEUROLOGY | Facility: CLINIC | Age: 68
End: 2023-11-14
Payer: MEDICARE

## 2023-11-14 ENCOUNTER — APPOINTMENT (OUTPATIENT)
Dept: CT IMAGING | Facility: HOSPITAL | Age: 68
End: 2023-11-14
Payer: MEDICARE

## 2023-11-14 VITALS
SYSTOLIC BLOOD PRESSURE: 125 MMHG | WEIGHT: 174 LBS | BODY MASS INDEX: 30.83 KG/M2 | HEIGHT: 63 IN | HEART RATE: 109 BPM | DIASTOLIC BLOOD PRESSURE: 68 MMHG

## 2023-11-14 VITALS
TEMPERATURE: 98.3 F | OXYGEN SATURATION: 95 % | RESPIRATION RATE: 17 BRPM | HEIGHT: 63 IN | HEART RATE: 87 BPM | BODY MASS INDEX: 30.7 KG/M2 | DIASTOLIC BLOOD PRESSURE: 80 MMHG | WEIGHT: 173.28 LBS | SYSTOLIC BLOOD PRESSURE: 143 MMHG

## 2023-11-14 DIAGNOSIS — E11.51 SMALL VESSEL ARTERIAL DISEASE DUE TO TYPE 2 DIABETES MELLITUS: Primary | ICD-10-CM

## 2023-11-14 DIAGNOSIS — R10.84 GENERALIZED ABDOMINAL PAIN: Primary | ICD-10-CM

## 2023-11-14 DIAGNOSIS — K52.9 COLITIS: ICD-10-CM

## 2023-11-14 DIAGNOSIS — R19.7 DIARRHEA, UNSPECIFIED TYPE: ICD-10-CM

## 2023-11-14 LAB
ALBUMIN SERPL-MCNC: 4.5 G/DL (ref 3.5–5.2)
ALBUMIN/GLOB SERPL: 1.5 G/DL
ALP SERPL-CCNC: 90 U/L (ref 39–117)
ALT SERPL W P-5'-P-CCNC: 33 U/L (ref 1–33)
ANION GAP SERPL CALCULATED.3IONS-SCNC: 12.7 MMOL/L (ref 5–15)
AST SERPL-CCNC: 36 U/L (ref 1–32)
BASOPHILS # BLD AUTO: 0.07 10*3/MM3 (ref 0–0.2)
BASOPHILS NFR BLD AUTO: 1 % (ref 0–1.5)
BILIRUB SERPL-MCNC: 0.4 MG/DL (ref 0–1.2)
BILIRUB UR QL STRIP: NEGATIVE
BUN SERPL-MCNC: 15 MG/DL (ref 8–23)
BUN/CREAT SERPL: 18.1 (ref 7–25)
CALCIUM SPEC-SCNC: 9.3 MG/DL (ref 8.6–10.5)
CHLORIDE SERPL-SCNC: 100 MMOL/L (ref 98–107)
CLARITY UR: CLEAR
CO2 SERPL-SCNC: 28.3 MMOL/L (ref 22–29)
COLOR UR: YELLOW
CREAT SERPL-MCNC: 0.83 MG/DL (ref 0.57–1)
D-LACTATE SERPL-SCNC: 1.9 MMOL/L (ref 0.5–2)
D-LACTATE SERPL-SCNC: 2.8 MMOL/L (ref 0.5–2)
DEPRECATED RDW RBC AUTO: 45.8 FL (ref 37–54)
EGFRCR SERPLBLD CKD-EPI 2021: 77.4 ML/MIN/1.73
EOSINOPHIL # BLD AUTO: 0.18 10*3/MM3 (ref 0–0.4)
EOSINOPHIL NFR BLD AUTO: 2.6 % (ref 0.3–6.2)
ERYTHROCYTE [DISTWIDTH] IN BLOOD BY AUTOMATED COUNT: 15.6 % (ref 12.3–15.4)
GLOBULIN UR ELPH-MCNC: 3 GM/DL
GLUCOSE SERPL-MCNC: 184 MG/DL (ref 65–99)
GLUCOSE UR STRIP-MCNC: ABNORMAL MG/DL
HCT VFR BLD AUTO: 38.5 % (ref 34–46.6)
HGB BLD-MCNC: 11.7 G/DL (ref 12–15.9)
HGB UR QL STRIP.AUTO: NEGATIVE
HOLD SPECIMEN: NORMAL
HOLD SPECIMEN: NORMAL
IMM GRANULOCYTES # BLD AUTO: 0.03 10*3/MM3 (ref 0–0.05)
IMM GRANULOCYTES NFR BLD AUTO: 0.4 % (ref 0–0.5)
KETONES UR QL STRIP: ABNORMAL
LEUKOCYTE ESTERASE UR QL STRIP.AUTO: NEGATIVE
LIPASE SERPL-CCNC: 28 U/L (ref 13–60)
LYMPHOCYTES # BLD AUTO: 2.04 10*3/MM3 (ref 0.7–3.1)
LYMPHOCYTES NFR BLD AUTO: 29.1 % (ref 19.6–45.3)
MCH RBC QN AUTO: 24.6 PG (ref 26.6–33)
MCHC RBC AUTO-ENTMCNC: 30.4 G/DL (ref 31.5–35.7)
MCV RBC AUTO: 81.1 FL (ref 79–97)
MONOCYTES # BLD AUTO: 0.57 10*3/MM3 (ref 0.1–0.9)
MONOCYTES NFR BLD AUTO: 8.1 % (ref 5–12)
NEUTROPHILS NFR BLD AUTO: 4.12 10*3/MM3 (ref 1.7–7)
NEUTROPHILS NFR BLD AUTO: 58.8 % (ref 42.7–76)
NITRITE UR QL STRIP: NEGATIVE
NRBC BLD AUTO-RTO: 0 /100 WBC (ref 0–0.2)
PH UR STRIP.AUTO: 6 [PH] (ref 5–8)
PLATELET # BLD AUTO: 311 10*3/MM3 (ref 140–450)
PMV BLD AUTO: 9.3 FL (ref 6–12)
POTASSIUM SERPL-SCNC: 3.6 MMOL/L (ref 3.5–5.2)
PROT SERPL-MCNC: 7.5 G/DL (ref 6–8.5)
PROT UR QL STRIP: ABNORMAL
RBC # BLD AUTO: 4.75 10*6/MM3 (ref 3.77–5.28)
SODIUM SERPL-SCNC: 141 MMOL/L (ref 136–145)
SP GR UR STRIP: >1.03 (ref 1–1.03)
UROBILINOGEN UR QL STRIP: ABNORMAL
WBC NRBC COR # BLD: 7.01 10*3/MM3 (ref 3.4–10.8)
WHOLE BLOOD HOLD COAG: NORMAL
WHOLE BLOOD HOLD SPECIMEN: NORMAL

## 2023-11-14 PROCEDURE — 25810000003 SODIUM CHLORIDE 0.9 % SOLUTION

## 2023-11-14 PROCEDURE — 81003 URINALYSIS AUTO W/O SCOPE: CPT | Performed by: EMERGENCY MEDICINE

## 2023-11-14 PROCEDURE — 99285 EMERGENCY DEPT VISIT HI MDM: CPT

## 2023-11-14 PROCEDURE — 1160F RVW MEDS BY RX/DR IN RCRD: CPT | Performed by: PSYCHIATRY & NEUROLOGY

## 2023-11-14 PROCEDURE — 25010000002 MORPHINE PER 10 MG: Performed by: EMERGENCY MEDICINE

## 2023-11-14 PROCEDURE — 85025 COMPLETE CBC W/AUTO DIFF WBC: CPT

## 2023-11-14 PROCEDURE — 25510000001 IOPAMIDOL PER 1 ML: Performed by: EMERGENCY MEDICINE

## 2023-11-14 PROCEDURE — 96374 THER/PROPH/DIAG INJ IV PUSH: CPT

## 2023-11-14 PROCEDURE — 83690 ASSAY OF LIPASE: CPT

## 2023-11-14 PROCEDURE — 1159F MED LIST DOCD IN RCRD: CPT | Performed by: PSYCHIATRY & NEUROLOGY

## 2023-11-14 PROCEDURE — 36415 COLL VENOUS BLD VENIPUNCTURE: CPT

## 2023-11-14 PROCEDURE — 80053 COMPREHEN METABOLIC PANEL: CPT

## 2023-11-14 PROCEDURE — 83605 ASSAY OF LACTIC ACID: CPT | Performed by: EMERGENCY MEDICINE

## 2023-11-14 PROCEDURE — 99214 OFFICE O/P EST MOD 30 MIN: CPT | Performed by: PSYCHIATRY & NEUROLOGY

## 2023-11-14 PROCEDURE — 74177 CT ABD & PELVIS W/CONTRAST: CPT

## 2023-11-14 RX ORDER — DICYCLOMINE HCL 20 MG
20 TABLET ORAL EVERY 6 HOURS
Qty: 20 TABLET | Refills: 0 | Status: SHIPPED | OUTPATIENT
Start: 2023-11-14

## 2023-11-14 RX ORDER — CIPROFLOXACIN 250 MG/1
500 TABLET, FILM COATED ORAL ONCE
Status: COMPLETED | OUTPATIENT
Start: 2023-11-14 | End: 2023-11-14

## 2023-11-14 RX ORDER — METRONIDAZOLE 500 MG/1
500 TABLET ORAL 3 TIMES DAILY
Qty: 21 TABLET | Refills: 0 | Status: SHIPPED | OUTPATIENT
Start: 2023-11-14 | End: 2023-11-21

## 2023-11-14 RX ORDER — LOPERAMIDE HYDROCHLORIDE 2 MG/1
2 CAPSULE ORAL 4 TIMES DAILY PRN
Qty: 12 CAPSULE | Refills: 0 | Status: SHIPPED | OUTPATIENT
Start: 2023-11-14 | End: 2023-11-17

## 2023-11-14 RX ORDER — CIPROFLOXACIN 500 MG/1
500 TABLET, FILM COATED ORAL 2 TIMES DAILY
Qty: 14 TABLET | Refills: 0 | Status: SHIPPED | OUTPATIENT
Start: 2023-11-14 | End: 2023-11-21

## 2023-11-14 RX ORDER — DICYCLOMINE HYDROCHLORIDE 10 MG/1
20 CAPSULE ORAL 4 TIMES DAILY
Status: DISCONTINUED | OUTPATIENT
Start: 2023-11-14 | End: 2023-11-14 | Stop reason: HOSPADM

## 2023-11-14 RX ORDER — METRONIDAZOLE 500 MG/1
500 TABLET ORAL ONCE
Status: COMPLETED | OUTPATIENT
Start: 2023-11-14 | End: 2023-11-14

## 2023-11-14 RX ORDER — MORPHINE SULFATE 2 MG/ML
2 INJECTION, SOLUTION INTRAMUSCULAR; INTRAVENOUS ONCE
Status: COMPLETED | OUTPATIENT
Start: 2023-11-14 | End: 2023-11-14

## 2023-11-14 RX ORDER — VARENICLINE 0.03 MG/.05ML
1 SPRAY NASAL 2 TIMES DAILY
COMMUNITY
Start: 2023-10-30

## 2023-11-14 RX ORDER — ONDANSETRON 4 MG/1
4 TABLET, ORALLY DISINTEGRATING ORAL 4 TIMES DAILY PRN
Qty: 20 TABLET | Refills: 0 | Status: SHIPPED | OUTPATIENT
Start: 2023-11-14

## 2023-11-14 RX ORDER — SODIUM CHLORIDE 0.9 % (FLUSH) 0.9 %
10 SYRINGE (ML) INJECTION AS NEEDED
Status: DISCONTINUED | OUTPATIENT
Start: 2023-11-14 | End: 2023-11-14 | Stop reason: HOSPADM

## 2023-11-14 RX ADMIN — METRONIDAZOLE 500 MG: 500 TABLET ORAL at 20:33

## 2023-11-14 RX ADMIN — SODIUM CHLORIDE 1000 ML: 9 INJECTION, SOLUTION INTRAVENOUS at 17:22

## 2023-11-14 RX ADMIN — CIPROFLOXACIN 500 MG: 250 TABLET, FILM COATED ORAL at 20:32

## 2023-11-14 RX ADMIN — MORPHINE SULFATE 2 MG: 2 INJECTION, SOLUTION INTRAMUSCULAR; INTRAVENOUS at 17:22

## 2023-11-14 RX ADMIN — IOPAMIDOL 100 ML: 755 INJECTION, SOLUTION INTRAVENOUS at 16:59

## 2023-11-14 NOTE — PROGRESS NOTES
"Chief Complaint  Results (Review imaging.)    Subjective          Jada Butler is a 67 y.o. female who presents to [facility] NEUROLOGY & NEUROSURGERY  History of Present Illness  67-year-old woman here for follow for MRI of the brain.  I reviewed with them the MRI of the brain showing moderate to advanced white matter changes in the white matter.  I discussed with them that this is secondary to small vessel disease from hypertension and diabetes.  She states that she stopped taking propranolol because it made her feel bad.  Her tremor is not as prominent.  Objective   Vital Signs:   /68   Pulse 109   Ht 160 cm (62.99\")   Wt 78.9 kg (174 lb)   BMI 30.83 kg/m²     Physical Exam   Alert, fluent, phasic, follows commands well.  There is no significant tremor noted on finger-to-nose testing or hands extended.        Assessment and Plan  Diagnoses and all orders for this visit:    1. Small vessel arterial disease due to type 2 diabetes mellitus (Primary)  Assessment & Plan:  -I discussed with her and her  that the small vessel disease is secondary to diabetes and hypertension.  I would recommend for her to be referred to a diabetic coordinator and a nutritionist.  I discussed with her the important parameter is to lose weight gradually to a normal BMI.  She is also going to discuss regarding gradual increasing physical activity.  Another    I will do CT angiogram of the head and neck and they are to call me the results.  Thank you for letting me participate in her care.    Orders:  -     CT Angiogram Neck With & Without Contrast; Future  -     CT Angiogram Head; Future         Total time spent with the patient and coordinating patient care was 30 minutes.    Follow Up  No follow-ups on file.  Patient was given instructions and counseling regarding her condition or for health maintenance advice. Please see specific information pulled into the AVS if appropriate.       "

## 2023-11-14 NOTE — ASSESSMENT & PLAN NOTE
-I discussed with her and her  that the small vessel disease is secondary to diabetes and hypertension.  I would recommend for her to be referred to a diabetic coordinator and a nutritionist.  I discussed with her the important parameter is to lose weight gradually to a normal BMI.  She is also going to discuss regarding gradual increasing physical activity.  Another    I will do CT angiogram of the head and neck and they are to call me the results.  Thank you for letting me participate in her care.

## 2023-11-14 NOTE — ED PROVIDER NOTES
Time: 3:27 PM EST  Date of encounter:  11/14/2023  Independent Historian/Clinical History and Information was obtained by:   Patient    History is limited by: N/A    Chief Complaint   Patient presents with    Diarrhea    Abdominal Pain         History of Present Illness:  Patient is a 67 y.o. year old female who presents to the emergency department for evaluation of abdominal pain.  Patient states her symptoms have been present x5 days.  She is having associated diarrhea.  Patient is not having nausea, vomiting, fever.  Patient states she has history of colitis and this feels similar to that.  (Provider in triage, Juancho Ann PA-C)    Patient Care Team  Primary Care Provider: Rain Mccall APRN    Past Medical History:     Allergies   Allergen Reactions    Amoxicillin-Pot Clavulanate GI Intolerance    Meloxicam Other (See Comments)     Severe body aches      Past Medical History:   Diagnosis Date    Anesthesia     pt states she has been slow at waking, but has also woke up during colonoscopy. Also states she  acts silly for a few days afterwards.    Arthritis     Bilateral carpal tunnel syndrome 02/22/2023    CTS (carpal tunnel syndrome)     Diabetes mellitus     does not check  bs    Essential tremor 9/5/2023    Fatty liver     no current issues    Hyperlipidemia     Hypertension     IBS (irritable bowel syndrome)     Impingement syndrome of left shoulder     Neuropathy in diabetes     PONV (postoperative nausea and vomiting)     Shingles      Past Surgical History:   Procedure Laterality Date    BACK SURGERY      herniated disc lumbar    BREAST SURGERY      breast reduction    CARPAL TUNNEL RELEASE Left 05/11/2023    Procedure: CARPAL TUNNEL RELEASE;  Surgeon: Stevo Zeng MD;  Location: [facility] OR [facility] [place];  Service: Orthopedics;  Laterality: Left;    CHOLECYSTECTOMY      COLONOSCOPY      HERNIA REPAIR      HYSTERECTOMY      KNEE SURGERY      NOSE SURGERY      x2    SHOULDER ARTHROSCOPY W/ ROTATOR CUFF REPAIR  Left 04/28/2022    Procedure: LEFT SHOULDER ARTHROSCOPY , SUBACROMIAL DECOMPRESSION, DISTAL CLAVICULECTOMY;  Surgeon: Stevo Zeng MD;  [place] [facility] [place] [facility] [place];  Service: Orthopedics;  Laterality: Left;    TUBAL ABDOMINAL LIGATION      WRIST SURGERY       Family History   Problem Relation Age of Onset    Dementia Mother     Stroke Mother     Stroke Father     Malig Hyperthermia Neg Hx        Home Medications:  Prior to Admission medications    Medication Sig Start Date End Date Taking? Authorizing Provider   amantadine (SYMMETREL) 100 MG tablet  6/26/23   Varghese Russell MD   amLODIPine (NORVASC) 5 MG tablet Take 1 tablet by mouth Every Night. 9/9/21   Emergency, Nurse Alicia RN   baclofen (LIORESAL) 10 MG tablet Take 1 tablet by mouth 3 (Three) Times a Day As Needed for Muscle Spasms. 4/25/23   Varghese Russell MD   cholestyramine (QUESTRAN) 4 g packet DISSOLVE CONTENTS OF 1 PACKET IN 2 TO 5 OUNCES OF WATER OR NON-CARBONATED BEVERAGE AND DRINK ONCE DAILY 6/2/22   Varghese Russell MD   diclofenac (VOLTAREN) 50 MG EC tablet Take 1 tablet by mouth Every 12 (Twelve) Hours. 5/31/23   Varghese Russell MD   diclofenac-miSOPROStol (ARTHROTEC 50) 50-0.2 MG EC tablet Take 1 tablet by mouth 2 (Two) Times a Day.    Varghese Russell MD   FLUoxetine (PROzac) 20 MG capsule Take 2 capsules by mouth Daily. 2 tab 10/21/21   Emergency, Nurse Epic, RN   gabapentin (NEURONTIN) 100 MG capsule TAKE 2 CAPSULES 2 TIMES A DAY 1/29/23   Varghese Russell MD   Gabapentin 10 %, Ketamine HCl 10 % Apply 1-2 g topically to the appropriate area as directed 3 (Three) to 4 (Four) times daily. 6/21/23 6/20/24  Stevo Zeng MD   glimepiride (AMARYL) 2 MG tablet glimepiride 2 mg oral tablet take 1 tablet (2 mg) by oral route once daily   Active    Emergency, Nurse Alicia RN   losartan (COZAAR) 100 MG tablet Take 1 tablet by mouth Every Night. 9/9/21   Emergency, Nurse Alicia RN   metFORMIN (GLUCOPHAGE) 500 MG tablet  Take 1 tablet by mouth 2 (Two) Times a Day. 10/15/21   Emergency, Nurse Epic, RN   omeprazole (priLOSEC) 20 MG capsule Take 1 capsule by mouth Daily.    Varghese Russell MD   phenazopyridine (PYRIDIUM) 100 MG tablet Take 1 tablet by mouth 3 (Three) Times a Day. 8/30/23   Varghese Russell MD   pravastatin (PRAVACHOL) 40 MG tablet Take 1 tablet by mouth Every Night.    Varghese Russell MD   propranolol (INDERAL) 40 MG tablet Titrate as directed weekly and take up to 2 tablets in the morning and 3 tablets in the evening. 9/5/23   OropillChai killian MD   SudoGest 60 MG tablet Take 1 tablet by mouth Every 12 (Twelve) Hours. 4/25/23   Varghese Russell MD   traZODone (DESYREL) 50 MG tablet Take 1 tablet by mouth every night at bedtime. 1/23/23   Varghese Russell MD   Tyrvaya 0.03 MG/ACT solution 1 spray by Each Nare route 2 (Two) Times a Day. 10/30/23   Varghese Russell MD   valACYclovir (VALTREX) 500 MG tablet Take 1 tablet by mouth Every 12 (Twelve) Hours. 11/23/22   Varghese Russell MD   vitamin D (ERGOCALCIFEROL) 1.25 MG (92684 UT) capsule capsule Take 1 capsule by mouth 1 (One) Time Per Week. 5/31/23   Varghese Russell MD   ciprofloxacin (CIPRO) 500 MG tablet Take 1 tablet by mouth Every 12 (Twelve) Hours. 3 days remaining 09/05/23  Patient not taking: Reported on 11/14/2023 8/30/23 11/14/23  Varghese Russell MD        Social History:   Social History     Tobacco Use    Smoking status: Former     Passive exposure: Past    Smokeless tobacco: Never   Vaping Use    Vaping Use: Never used   Substance Use Topics    Alcohol use: Yes     Comment: Ocassionally    Drug use: Not Currently         Review of Systems:  Review of Systems   Constitutional:  Negative for fever.   HENT:  Negative for sore throat.    Eyes: Negative.    Respiratory:  Negative for cough and shortness of breath.    Cardiovascular:  Negative for chest pain.   Gastrointestinal:  Positive for abdominal pain  "and diarrhea. Negative for vomiting.   Genitourinary:  Negative for dysuria.   Musculoskeletal:  Negative for neck pain.   Skin:  Negative for rash.   Allergic/Immunologic: Negative.    Neurological:  Negative for weakness, numbness and headaches.   Hematological: Negative.    Psychiatric/Behavioral: Negative.     All other systems reviewed and are negative.       Physical Exam:  /77 (Patient Position: Sitting)   Pulse 96   Temp 98.3 °F (36.8 °C) (Oral)   Resp 18   Ht 160 cm (63\")   Wt 78.6 kg (173 lb 4.5 oz)   SpO2 94%   BMI 30.70 kg/m²         Physical Exam  Vitals and nursing note reviewed.   Constitutional:       General: She is not in acute distress.     Appearance: Normal appearance. She is not toxic-appearing.   HENT:      Head: Normocephalic and atraumatic.      Jaw: There is normal jaw occlusion.      Mouth/Throat:      Mouth: Mucous membranes are moist.   Eyes:      General: Lids are normal.      Extraocular Movements: Extraocular movements intact.      Conjunctiva/sclera: Conjunctivae normal.      Pupils: Pupils are equal, round, and reactive to light.   Cardiovascular:      Rate and Rhythm: Normal rate and regular rhythm.      Pulses: Normal pulses.      Heart sounds: Normal heart sounds.   Pulmonary:      Effort: Pulmonary effort is normal. No respiratory distress.      Breath sounds: Normal breath sounds. No wheezing or rhonchi.   Abdominal:      General: Abdomen is flat. There is no distension.      Palpations: Abdomen is soft.      Tenderness: There is generalized abdominal tenderness. There is no guarding or rebound.   Musculoskeletal:         General: Normal range of motion.      Cervical back: Normal range of motion and neck supple.      Right lower leg: No edema.      Left lower leg: No edema.   Skin:     General: Skin is warm and dry.   Neurological:      General: No focal deficit present.      Mental Status: She is alert and oriented to person, place, and time. Mental status is at " baseline.   Psychiatric:         Mood and Affect: Mood normal.         Behavior: Behavior normal.            Procedures:  Procedures      Medical Decision Making:      Comorbidities that affect care:    Irritable bowel syndrome, Diabetes, Hypertension    External Notes reviewed:    Previous Clinic Note: Neurology office visit from earlier today      The following orders were placed and all results were independently analyzed by me:  Orders Placed This Encounter   Procedures    CT Abdomen Pelvis With Contrast    [place] Draw    Comprehensive Metabolic Panel    Lipase    Urinalysis With Microscopic If Indicated (No Culture) - Urine, Clean Catch    Lactic Acid, Plasma    CBC Auto Differential    STAT Lactic Acid, Reflex    NPO Diet NPO Type: Strict NPO    Undress & Gown    Insert Peripheral IV    CBC & Differential    Green Top (Gel)    Lavender Top    Gold Top - SST    Light Blue Top       Medications Given in the Emergency Department:  Medications   sodium chloride 0.9 % flush 10 mL (has no administration in time range)   sodium chloride 0.9 % bolus 1,000 mL (1,000 mL Intravenous New Bag 11/14/23 1722)   morphine injection 2 mg (2 mg Intravenous Given 11/14/23 1722)   iopamidol (ISOVUE-370) 76 % injection 100 mL (100 mL Intravenous Given 11/14/23 1659)        ED Course:    The patient was initially evaluated in the triage area where orders were placed. The patient was later dispositioned by LURDES Puentes.      The patient was advised to stay for completion of workup which includes but is not limited to communication of labs and radiological results, reassessment and plan. The patient was advised that leaving prior to disposition by a provider could result in critical findings that are not communicated to the patient.     ED Course as of 11/14/23 1911 Tue Nov 14, 2023   1528 PROVIDER IN TRIAGE  Patient was evaluated by me in triage, Juancho Ann PA-C.  Orders were placed and patient is currently awaiting  final results and disposition.  [MD]      ED Course User Index  [MD] Juancho Ann PA-C       Labs:    Lab Results (last 24 hours)       Procedure Component Value Units Date/Time    CBC & Differential [[id number]]  (Abnormal) Collected: 11/14/23 1532    Specimen: Blood from Arm, Right Updated: 11/14/23 1540    Narrative:      The following orders were created for panel order CBC & Differential.  Procedure                               Abnormality         Status                     ---------                               -----------         ------                     CBC Auto Differential[[id number]]        Abnormal            Final result                 Please view results for these tests on the individual orders.    Comprehensive Metabolic Panel [[id number]]  (Abnormal) Collected: 11/14/23 1532    Specimen: Blood from Arm, Right Updated: 11/14/23 1559     Glucose 184 mg/dL      BUN 15 mg/dL      Creatinine 0.83 mg/dL      Sodium 141 mmol/L      Potassium 3.6 mmol/L      Chloride 100 mmol/L      CO2 28.3 mmol/L      Calcium 9.3 mg/dL      Total Protein 7.5 g/dL      Albumin 4.5 g/dL      ALT (SGPT) 33 U/L      AST (SGOT) 36 U/L      Alkaline Phosphatase 90 U/L      Total Bilirubin 0.4 mg/dL      Globulin 3.0 gm/dL      A/G Ratio 1.5 g/dL      BUN/Creatinine Ratio 18.1     Anion Gap 12.7 mmol/L      eGFR 77.4 mL/min/1.73     Narrative:      GFR Normal >60  Chronic Kidney Disease <60  Kidney Failure <15      Lipase [[id number]]  (Normal) Collected: 11/14/23 1532    Specimen: Blood from Arm, Right Updated: 11/14/23 1559     Lipase 28 U/L     Lactic Acid, Plasma [[id number]]  (Abnormal) Collected: 11/14/23 1532    Specimen: Blood from Arm, Right Updated: 11/14/23 1623     Lactate 2.8 mmol/L      Comment: Verified by repeat analysis.       CBC Auto Differential [[id number]]  (Abnormal) Collected: 11/14/23 1532    Specimen: Blood from Arm, Right Updated: 11/14/23 1540     WBC 7.01 10*3/mm3      RBC 4.75 10*6/mm3       Hemoglobin 11.7 g/dL      Hematocrit 38.5 %      MCV 81.1 fL      MCH 24.6 pg      MCHC 30.4 g/dL      RDW 15.6 %      RDW-SD 45.8 fl      MPV 9.3 fL      Platelets 311 10*3/mm3      Neutrophil % 58.8 %      Lymphocyte % 29.1 %      Monocyte % 8.1 %      Eosinophil % 2.6 %      Basophil % 1.0 %      Immature Grans % 0.4 %      Neutrophils, Absolute 4.12 10*3/mm3      Lymphocytes, Absolute 2.04 10*3/mm3      Monocytes, Absolute 0.57 10*3/mm3      Eosinophils, Absolute 0.18 10*3/mm3      Basophils, Absolute 0.07 10*3/mm3      Immature Grans, Absolute 0.03 10*3/mm3      nRBC 0.0 /100 WBC     Urinalysis With Microscopic If Indicated (No Culture) - Urine, Clean Catch [[id number]]  (Abnormal) Collected: 11/14/23 1549    Specimen: Urine, Clean Catch Updated: 11/14/23 1620     Color, UA Yellow     Appearance, UA Clear     pH, UA 6.0     Specific Gravity, UA >1.030     Glucose, UA >=1000 mg/dL (3+)     Ketones, UA Trace     Bilirubin, UA Negative     Blood, UA Negative     Protein, UA Trace     Leuk Esterase, UA Negative     Nitrite, UA Negative     Urobilinogen, UA 1.0 E.U./dL    Narrative:      Urine microscopic not indicated.    STAT Lactic Acid, Reflex [[id number]]  (Normal) Collected: 11/14/23 1834    Specimen: Blood Updated: 11/14/23 1855     Lactate 1.9 mmol/L              Imaging:    CT Abdomen Pelvis With Contrast    Result Date: 11/14/2023  PROCEDURE: CT ABDOMEN PELVIS W CONTRAST  COMPARISON: [facility], CT, CT ABDOMEN PELVIS W CONTRAST, 11/10/2021, 22:37.  INDICATIONS: Abd pain and diarrhea  TECHNIQUE: CT images were created with non-ionic intravenous contrast material.   PROTOCOL:   Standard imaging protocol performed    RADIATION:   DLP: 923.5mGy*cm   Automated exposure control was utilized to minimize radiation dose. CONTRAST: 100cc Isovue 370 I.V.  FINDINGS:  The lung bases are clear.  The liver is of normal size.  The liver is of diffusely diminished density consistent with mild fatty  infiltration.  The gallbladder is absent.  Surgical clips are seen in the gallbladder bed.  No pancreatic or adrenal mass is evident.  The spleen is of normal size.  The kidneys enhance bilaterally.  A few renal cysts are of no clinical significance.  No renal or ureteral stones are seen.  There is no evidence of hydronephrosis.  The urinary bladder is not abnormally distended.  No vascular abnormality is seen.  The uterus is absent.  No pelvic mass is seen.  Bowel loops are not abnormally dilated.  The appendix is normal.  The wall of the colon is mildly and diffusely thickened which may represent colitis.  The differential diagnosis includes infection and inflammatory bowel disease.  Ischemia is less likely.  Scattered colonic diverticula are evident.  There are no findings of diverticulitis.  The anterior abdominal wall is intact, no hernia is evident.  Moderate to marked degenerative changes seen in the lower thoracic and lumbar spine.  Marked spinal stenosis at L4-5 is unchanged.  CONTINUED ON NEXT PAGE...          CT scan of the abdomen and pelvis with IV contrast demonstrating fatty liver.  Post cholecystectomy and hysterectomy.  The wall of the colon is mildly and diffusely thickened, which may represent colitis.  Scattered colonic diverticula without diverticulitis.     SIDDHARTH PACHECO MD       Electronically Signed and Approved By: SIDDHARTH PACHECO MD on 11/14/2023 at 17:20                Differential Diagnosis and Discussion:      Abdominal Pain: Based on the patient's signs and symptoms, I considered abdominal aortic aneurysm, small bowel obstruction, pancreatitis, acute cholecystitis, acute appendecitis, peptic ulcer disease, gastritis, colitis, endocrine disorders, irritable bowel syndrome and other differential diagnosis an etiology of the patient's abdominal pain.    All labs were reviewed and interpreted by me.  CT scan radiology impression was interpreted by me.    MDM     Amount and/or Complexity of  Data Reviewed  Clinical lab tests: reviewed    The patient is resting comfortably and feels better, is alert and in no distress. Repeat examination is unremarkable and benign; in particular, there's no discomfort at McBurney's point and there is no pulsatile mass. The history, exam, diagnostic testing, and current condition does not suggest acute appendicitis, bowel obstruction, acute cholecystitis, bowel perforation, major gastrointestinal bleeding, severe diverticulitis, abdominal aortic aneurysm, mesenteric ischemia, volvulus, sepsis, or other significant pathology that warrants further testing, continued ED treatment, admission, for surgical evaluation at this point. The vital signs have been stable. The patient does not have uncontrollable pain, intractable vomiting, or other significant symptoms. The patient's condition is stable and appropriate for discharge from the emergency department.            Patient Care Considerations:    SEPSIS was considered but is NOT present in the emergency department as SIRS criteria is not present. CONSULT: I considered consulting GI or general surgery, however  no acute complications.      Consultants/Shared Management Plan:    None    Social Determinants of Health:    Patient is independent, reliable, and has access to care.       Disposition and Care Coordination:    Discharged: The patient is suitable and stable for discharge with no need for consideration of observation or admission.    I have explained the patient´s condition, diagnoses and treatment plan based on the information available to me at this time. I have answered questions and addressed any concerns. The patient has a good  understanding of the patient´s diagnosis, condition, and treatment plan as can be expected at this point. The vital signs have been stable. The patient´s condition is stable and appropriate for discharge from the emergency department.      The patient will pursue further outpatient  evaluation with the primary care physician or other designated or consulting physician as outlined in the discharge instructions. They are agreeable to this plan of care and follow-up instructions have been explained in detail. The patient has received these instructions in written format and have expressed an understanding of the discharge instructions. The patient is aware that any significant change in condition or worsening of symptoms should prompt an immediate return to this or the closest emergency department or call to 911.  I have explained discharge medications and the need for follow up with the patient/caretakers. This was also printed in the discharge instructions. Patient was discharged with the following medications and follow up:      Medication List        New Prescriptions      ciprofloxacin 500 MG tablet  Commonly known as: CIPRO  Take 1 tablet by mouth 2 (Two) Times a Day for 7 days.     dicyclomine 20 MG tablet  Commonly known as: BENTYL  Take 1 tablet by mouth Every 6 (Six) Hours.     loperamide 2 MG capsule  Commonly known as: IMODIUM  Take 1 capsule by mouth 4 (Four) Times a Day As Needed for Diarrhea for up to 3 days.     metroNIDAZOLE 500 MG tablet  Commonly known as: FLAGYL  Take 1 tablet by mouth 3 (Three) Times a Day for 7 days.     ondansetron ODT 4 MG disintegrating tablet  Commonly known as: ZOFRAN-ODT  Place 1 tablet on the tongue 4 (Four) Times a Day As Needed for Nausea or Vomiting.               Where to Get Your Medications        These medications were sent to [facility] - [address] - [phone] [facility] [phone] [address] [address]      Phone: [phone]   ciprofloxacin 500 MG tablet  dicyclomine 20 MG tablet  loperamide 2 MG capsule  metroNIDAZOLE 500 MG tablet  ondansetron ODT 4 MG disintegrating tablet      Rain Mccall, APRN  [address] [facility] [address]  [facility] [address] [phone]    Call in 2 days         Final  diagnoses:   Generalized abdominal pain   Diarrhea, unspecified type   Colitis        ED Disposition       ED Disposition   Discharge    Condition   Stable    Comment   --               This medical record created using voice recognition software.             Nancy Sultana, APRN  11/14/23 1911

## 2023-11-15 NOTE — DISCHARGE INSTRUCTIONS
Please follow-up with your primary care provider.  Return to the emergency department for worsening symptoms.

## 2023-11-27 ENCOUNTER — TELEPHONE (OUTPATIENT)
Dept: NEUROLOGY | Facility: CLINIC | Age: 68
End: 2023-11-27
Payer: MEDICARE

## 2023-11-27 DIAGNOSIS — E11.51 SMALL VESSEL ARTERIAL DISEASE DUE TO TYPE 2 DIABETES MELLITUS: Primary | ICD-10-CM

## 2023-11-27 NOTE — TELEPHONE ENCOUNTER
Provider: SHI    Caller: ABHAY    Relationship to Patient:  [employer]    Phone Number: [phone]    Reason for Call: ABHAY FROM  [employer] IS CALLING REGARDING THE PT'S SCHEDULED CTA OF NECK AND BRAIN THAT IS SCHEDULED FOR 12/1/23.  INSURANCE IS  WANTING TO KNOW WHY AN MRA CANNOT BE DONE INSTEAD AND THEY ARE WANTING A [id number]-KSEA SET UP    YNKK-[id number] PHONE # [phone]  TRACKING # [id number]    PLEASE REVIEW AND ADVISE     THANK YOU

## 2023-11-29 NOTE — TELEPHONE ENCOUNTER
Spoke with Marina at [facility] and let her know Dr. Farrell ordered MRA of brain and carotids and if insurance will approve can cancel CT angiogram.

## 2023-11-30 ENCOUNTER — TELEPHONE (OUTPATIENT)
Dept: NEUROLOGY | Facility: CLINIC | Age: 68
End: 2023-11-30
Payer: MEDICARE

## 2023-11-30 NOTE — TELEPHONE ENCOUNTER
Caller: ABHAY    Relationship:  FINANCIAL    Best call back number: [phone] LVM IF NO ANSWER DIRECT LINE    What was the call regarding: ABHAY IS CALLING BECAUSE THE [facility] IS REQUESTING THE PEER TO PEER FOR TO OBTAIN AUTH FOR MRA AND CT OF THE BRAIN AND NECK. THEY DON'T BELIEVE THE TESTING ORDERS WILL BE APPROVED WITHOUT THE PEER TO PEER.     PTP#[phone]  REF (1):[id number] - FOR CT  REF (2): [id number] - FOR MRAS    PLEASE REFERENCE PREVIOUS ENCOUNTERS FOR MORE INFO.

## 2023-11-30 NOTE — TELEPHONE ENCOUNTER
[facility] called to say MRA of brain and carotids are pending. If not approved by 3pm is it ok to cancel and reschedule?

## 2023-12-01 ENCOUNTER — TRANSCRIBE ORDERS (OUTPATIENT)
Dept: DIABETES SERVICES | Facility: HOSPITAL | Age: 68
End: 2023-12-01
Payer: MEDICARE

## 2023-12-01 DIAGNOSIS — E11.9 TYPE 2 DIABETES MELLITUS WITHOUT COMPLICATION, UNSPECIFIED WHETHER LONG TERM INSULIN USE: Primary | ICD-10-CM

## 2023-12-04 DIAGNOSIS — E11.51 SMALL VESSEL ARTERIAL DISEASE DUE TO TYPE 2 DIABETES MELLITUS: Primary | ICD-10-CM

## 2023-12-04 DIAGNOSIS — I63.81 OTHER CEREBRAL INFARCTION DUE TO OCCLUSION OR STENOSIS OF SMALL ARTERY: ICD-10-CM

## 2023-12-04 NOTE — TELEPHONE ENCOUNTER
Peer to peer completed by  Dr. Farrell. MRA and CT of the brain and neck were denied. US ordered. Contacted pt to let her know. No answer. Left voicemail with instruction to call this office or her insurance company with any questions.     Call ref# [id number]

## 2023-12-13 ENCOUNTER — NUTRITION (OUTPATIENT)
Dept: DIABETES SERVICES | Facility: HOSPITAL | Age: 68
End: 2023-12-13
Payer: MEDICARE

## 2023-12-13 DIAGNOSIS — E11.9 TYPE 2 DIABETES MELLITUS WITHOUT COMPLICATION, WITHOUT LONG-TERM CURRENT USE OF INSULIN: Primary | ICD-10-CM

## 2023-12-13 PROCEDURE — 97802 MEDICAL NUTRITION INDIV IN: CPT | Performed by: DIETITIAN, REGISTERED

## 2024-01-12 VITALS — HEIGHT: 63 IN | WEIGHT: 174.1 LBS | BODY MASS INDEX: 30.85 KG/M2

## 2024-01-12 NOTE — PROGRESS NOTES
"  Jada Butler is a 68 y.o. female who presents to [facility] for nutrition consult r/t diagnosis of T2DM, pt states she was diagnosed about 5 years ago.  Jada Butler is referred by LURDES Rinaldi.    Past Medical History:   Diagnosis Date    Anesthesia     pt states she has been slow at waking, but has also woke up during colonoscopy. Also states she  acts silly for a few days afterwards.    Arthritis     Bilateral carpal tunnel syndrome 02/22/2023    CTS (carpal tunnel syndrome)     Diabetes mellitus     does not check  bs    Essential tremor 9/5/2023    Fatty liver     no current issues    Hyperlipidemia     Hypertension     IBS (irritable bowel syndrome)     Impingement syndrome of left shoulder     Neuropathy in diabetes     PONV (postoperative nausea and vomiting)     Shingles        Anthropometrics    160 cm (63\")  79 kg (174 lb 1.6 oz)  30.84 kg/m²    Diabetes History    Diabetes History  What type of diabetes do you have?: Type 2  Length of Diabetes Diagnosis: 1 - 5 years  Current DM knowledge: good  Have you had diabetes education/teaching in the past?: no  Do you test your blood sugar at home?: no    Education Preferences    Education Preferences  What areas of diabetes would you like to learn about?: diet information    Nutrition Information    Nutrition Information  Enter everything you can remember eating in the last 24 hours (1 day): breakfast- toast w/ sugar free jelly; lunch- leftovers; dinner- burger w/ cheese and bun, vegetables; snacks- sugar free cookie; beverages- water, 1 regular soda/day  What is the biggest challenge you have with your diet?: Knowledge    Education Needs    DM Education Needs  Medication: Oral  Healthy Eating: RD consult, Reviewed meal plan, Basic meal plan provided  Physical Activity Frequency: Regularly  Motivation: Engaged  Teaching Method: Explanation, Discussion, Handouts  Patient Response: Verbalized understanding    DM " "Goals    DM Goals  Healthy Eating - Goal: Today  Being Active - Goal: Today  Taking Medication - Goal: Today      Medications    Current Outpatient Medications   Medication    amantadine    amLODIPine    baclofen    cholestyramine    diclofenac    diclofenac-miSOPROStol    dicyclomine    FLUoxetine    gabapentin    Gabapentin 10 %, Ketamine HCl 10 %    glimepiride    losartan    metFORMIN    omeprazole    ondansetron ODT    phenazopyridine    pravastatin    propranolol    SudoGest    traZODone    Tyrvaya    valACYclovir    vitamin D     Labs       No results found for: \"CHOL\", \"TRIG\", \"HDL\", \"LDL\"    Nutrition counseling provided on carbohydrate counting, portion control, measuring and reading labels.  Discussed eating out and gave suggestions on controlling carbohydrate intake and making healthier food choices.     Meal Plan:     Total Carbohydrates per meal: 2-3 carb servings/meal, 3 meals/day  Lean protein with meals.  Limit added fats.  Snacks: 1 carbohydrate serving (</= 22 g) + 1 protein serving.     Daily exercise encouraged (as recommended by healthcare provider). Discussed the benefits of exercise in lowering blood glucose, blood pressure, cholesterol, stress and controlling body weight.     Discussed blood glucose monitoring to assist with understanding of factors affecting blood glucose and assist with management of diabetes.  Discussed and provided with target BG ranges.     Literature provided: Diabetes Nutrition Placemat, Choose Your Foods Booklet    Dietitian contact number provided.  Patient encouraged to call with questions or concerns.     Time spent with patient: 60 minutes    Sarahy Gonzaelz RDN, LD  12/13/2023    "

## 2024-01-17 ENCOUNTER — HOSPITAL ENCOUNTER (OUTPATIENT)
Dept: CARDIOLOGY | Facility: HOSPITAL | Age: 69
Discharge: HOME | End: 2024-01-17
Admitting: PSYCHIATRY & NEUROLOGY
Payer: MEDICARE

## 2024-01-17 DIAGNOSIS — E11.51 SMALL VESSEL ARTERIAL DISEASE DUE TO TYPE 2 DIABETES MELLITUS: ICD-10-CM

## 2024-01-17 DIAGNOSIS — I63.81 OTHER CEREBRAL INFARCTION DUE TO OCCLUSION OR STENOSIS OF SMALL ARTERY: ICD-10-CM

## 2024-01-17 LAB
BH CV XLRA MEAS LEFT CAROTID BULB EDV: 7 CM/SEC
BH CV XLRA MEAS LEFT CAROTID BULB PSV: 23 CM/SEC
BH CV XLRA MEAS LEFT DIST CCA EDV: -20.3 CM/SEC
BH CV XLRA MEAS LEFT DIST CCA PSV: -71.5 CM/SEC
BH CV XLRA MEAS LEFT DIST ICA EDV: -31.5 CM/SEC
BH CV XLRA MEAS LEFT DIST ICA PSV: -93.9 CM/SEC
BH CV XLRA MEAS LEFT ICA/CCA RATIO: 0.59
BH CV XLRA MEAS LEFT MID ICA EDV: -26.6 CM/SEC
BH CV XLRA MEAS LEFT MID ICA PSV: -63.1 CM/SEC
BH CV XLRA MEAS LEFT PROX CCA EDV: -20.3 CM/SEC
BH CV XLRA MEAS LEFT PROX CCA PSV: -79.9 CM/SEC
BH CV XLRA MEAS LEFT PROX ECA EDV: -17.5 CM/SEC
BH CV XLRA MEAS LEFT PROX ECA PSV: -60.2 CM/SEC
BH CV XLRA MEAS LEFT PROX ICA EDV: -17.9 CM/SEC
BH CV XLRA MEAS LEFT PROX ICA PSV: -42.4 CM/SEC
BH CV XLRA MEAS LEFT VERTEBRAL A EDV: -14.5 CM/SEC
BH CV XLRA MEAS LEFT VERTEBRAL A PSV: -43.5 CM/SEC
BH CV XLRA MEAS RIGHT CAROTID BULB EDV: 14 CM/SEC
BH CV XLRA MEAS RIGHT CAROTID BULB PSV: 49 CM/SEC
BH CV XLRA MEAS RIGHT DIST CCA EDV: -16.7 CM/SEC
BH CV XLRA MEAS RIGHT DIST CCA PSV: -60.4 CM/SEC
BH CV XLRA MEAS RIGHT DIST ICA EDV: -28.5 CM/SEC
BH CV XLRA MEAS RIGHT DIST ICA PSV: -77.9 CM/SEC
BH CV XLRA MEAS RIGHT ICA/CCA RATIO: 0.95
BH CV XLRA MEAS RIGHT MID ICA EDV: -20.6 CM/SEC
BH CV XLRA MEAS RIGHT MID ICA PSV: -60 CM/SEC
BH CV XLRA MEAS RIGHT PROX CCA EDV: -15.5 CM/SEC
BH CV XLRA MEAS RIGHT PROX CCA PSV: -84.5 CM/SEC
BH CV XLRA MEAS RIGHT PROX ECA EDV: -11.5 CM/SEC
BH CV XLRA MEAS RIGHT PROX ECA PSV: -74.1 CM/SEC
BH CV XLRA MEAS RIGHT PROX ICA EDV: -23.6 CM/SEC
BH CV XLRA MEAS RIGHT PROX ICA PSV: -57.5 CM/SEC
BH CV XLRA MEAS RIGHT VERTEBRAL A EDV: -11.4 CM/SEC
BH CV XLRA MEAS RIGHT VERTEBRAL A PSV: -38.4 CM/SEC
LEFT ARM BP: NORMAL MMHG
RIGHT ARM BP: NORMAL MMHG

## 2024-01-17 PROCEDURE — 93880 EXTRACRANIAL BILAT STUDY: CPT

## 2024-03-19 ENCOUNTER — TELEPHONE (OUTPATIENT)
Dept: CARDIOLOGY | Facility: CLINIC | Age: 69
End: 2024-03-19
Payer: MEDICARE

## 2024-03-19 NOTE — TELEPHONE ENCOUNTER
Hub staff attempted to follow warm transfer process and was unsuccessful     Caller: [facility]    Relationship to patient: Other    Best call back number: [phone]    Patient is needing: [facility] IS NEEDING THE LAST OFFICE NOTE FOR HUNTER BOND FROM 5.9.23 WHEN SHE HAD [facility] INSURANCE. PLEASE CONTACT RAFAL AT HER DIRECT NUMBER: [phone] FOR FURTHER INFORMATION. SHE IS IN THE OFFICE FROM 8:30 AM - 5 PM MON - FRI EST.

## 2024-03-21 ENCOUNTER — HOSPITAL ENCOUNTER (OUTPATIENT)
Dept: GENERAL RADIOLOGY | Facility: HOSPITAL | Age: 69
Discharge: HOME | End: 2024-03-21
Admitting: FAMILY MEDICINE
Payer: MEDICARE

## 2024-03-21 ENCOUNTER — TRANSCRIBE ORDERS (OUTPATIENT)
Dept: ADMINISTRATIVE | Facility: HOSPITAL | Age: 69
End: 2024-03-21
Payer: MEDICARE

## 2024-03-21 DIAGNOSIS — M54.50 LOW BACK PAIN, UNSPECIFIED BACK PAIN LATERALITY, UNSPECIFIED CHRONICITY, UNSPECIFIED WHETHER SCIATICA PRESENT: Primary | ICD-10-CM

## 2024-03-21 DIAGNOSIS — M54.50 LOW BACK PAIN, UNSPECIFIED BACK PAIN LATERALITY, UNSPECIFIED CHRONICITY, UNSPECIFIED WHETHER SCIATICA PRESENT: ICD-10-CM

## 2024-03-21 PROCEDURE — 72110 X-RAY EXAM L-2 SPINE 4/>VWS: CPT

## 2024-03-28 ENCOUNTER — HOSPITAL ENCOUNTER (OUTPATIENT)
Dept: GENERAL RADIOLOGY | Facility: HOSPITAL | Age: 69
Discharge: HOME | End: 2024-03-28
Payer: MEDICARE

## 2024-03-28 ENCOUNTER — TRANSCRIBE ORDERS (OUTPATIENT)
Dept: LAB | Facility: HOSPITAL | Age: 69
End: 2024-03-28
Payer: MEDICARE

## 2024-03-28 DIAGNOSIS — M25.532 LEFT WRIST PAIN: Primary | ICD-10-CM

## 2024-03-28 DIAGNOSIS — G89.29 CHRONIC LEFT SHOULDER PAIN: ICD-10-CM

## 2024-03-28 DIAGNOSIS — M25.512 CHRONIC LEFT SHOULDER PAIN: ICD-10-CM

## 2024-03-28 DIAGNOSIS — M25.532 LEFT WRIST PAIN: ICD-10-CM

## 2024-03-28 PROCEDURE — 73030 X-RAY EXAM OF SHOULDER: CPT

## 2024-03-28 PROCEDURE — 73110 X-RAY EXAM OF WRIST: CPT

## 2024-04-02 ENCOUNTER — TRANSCRIBE ORDERS (OUTPATIENT)
Dept: ADMINISTRATIVE | Facility: HOSPITAL | Age: 69
End: 2024-04-02
Payer: MEDICARE

## 2024-04-02 DIAGNOSIS — M54.50 LOW BACK PAIN, UNSPECIFIED BACK PAIN LATERALITY, UNSPECIFIED CHRONICITY, UNSPECIFIED WHETHER SCIATICA PRESENT: Primary | ICD-10-CM

## 2024-04-02 DIAGNOSIS — M47.816 SPONDYLOSIS OF LUMBAR REGION WITHOUT MYELOPATHY OR RADICULOPATHY: ICD-10-CM

## 2024-04-02 DIAGNOSIS — M43.17 SPONDYLOLISTHESIS OF LUMBOSACRAL REGION: ICD-10-CM

## 2024-04-22 ENCOUNTER — OFFICE VISIT (OUTPATIENT)
Dept: ORTHOPEDIC SURGERY | Facility: CLINIC | Age: 69
End: 2024-04-22
Payer: MEDICARE

## 2024-04-22 VITALS
OXYGEN SATURATION: 93 % | HEART RATE: 87 BPM | DIASTOLIC BLOOD PRESSURE: 82 MMHG | WEIGHT: 173 LBS | HEIGHT: 63 IN | SYSTOLIC BLOOD PRESSURE: 134 MMHG | BODY MASS INDEX: 30.65 KG/M2

## 2024-04-22 DIAGNOSIS — M25.512 LEFT SHOULDER PAIN, UNSPECIFIED CHRONICITY: Primary | ICD-10-CM

## 2024-04-22 PROCEDURE — 20610 DRAIN/INJ JOINT/BURSA W/O US: CPT | Performed by: ORTHOPAEDIC SURGERY

## 2024-04-22 PROCEDURE — 99203 OFFICE O/P NEW LOW 30 MIN: CPT | Performed by: ORTHOPAEDIC SURGERY

## 2024-04-22 RX ORDER — LIDOCAINE HYDROCHLORIDE 10 MG/ML
5 INJECTION, SOLUTION INFILTRATION; PERINEURAL
Status: COMPLETED | OUTPATIENT
Start: 2024-04-22 | End: 2024-04-22

## 2024-04-22 RX ORDER — TRIAMCINOLONE ACETONIDE 40 MG/ML
40 INJECTION, SUSPENSION INTRA-ARTICULAR; INTRAMUSCULAR
Status: COMPLETED | OUTPATIENT
Start: 2024-04-22 | End: 2024-04-22

## 2024-04-22 RX ADMIN — LIDOCAINE HYDROCHLORIDE 5 ML: 10 INJECTION, SOLUTION INFILTRATION; PERINEURAL at 14:08

## 2024-04-22 RX ADMIN — TRIAMCINOLONE ACETONIDE 40 MG: 40 INJECTION, SUSPENSION INTRA-ARTICULAR; INTRAMUSCULAR at 14:08

## 2024-04-22 NOTE — PROGRESS NOTES
"Chief Complaint  Initial Evaluation of the Left Shoulder     Subjective      Jada Butler presents to [facility] for initial evaluation of the left shoulder.  She had a MRI and is here to review. She had a  left shoulder arthroscopy with subacromial decompression, distal clavicle resection performed on 04/28/2022. She has pain in the shoulder and down the arm.        Allergies   Allergen Reactions    Amoxicillin-Pot Clavulanate GI Intolerance    Meloxicam Other (See Comments)     Severe body aches         Social History     Socioeconomic History    Marital status:    Tobacco Use    Smoking status: Former     Passive exposure: Past    Smokeless tobacco: Never   Vaping Use    Vaping status: Never Used   Substance and Sexual Activity    Alcohol use: Yes     Comment: Ocassionally    Drug use: Not Currently    Sexual activity: Not Currently     Birth control/protection: Surgical, Hysterectomy        I reviewed the patient's chief complaint, history of present illness, review of systems, past medical history, surgical history, family history, social history, medications, and allergy list.     Review of Systems     Constitutional: Denies fevers, chills, weight loss  Cardiovascular: Denies chest pain, shortness of breath  Skin: Denies rashes, acute skin changes  Neurologic: Denies headache, loss of consciousness        Vital Signs:   /82   Pulse 87   Ht 160 cm (63\")   Wt 78.5 kg (173 lb)   SpO2 93%   BMI 30.65 kg/m²          Physical Exam  General: Alert. No acute distress    Ortho Exam        LEFT SHOULDER Forward flexion 170 with pain. Abduction 100. External rotation 60. Internal rotation Back pocket. Positive Cross body adduction. Supraspinatus strength 4+/5. Infraspinatus Strength 5/5. Infrared subscap 5/5. Positive Queen. Positive Neer. Negative Apprehension. Negative Lift off. (Negative Obriens. Sensation intact to light touch, median, radial, ulnar nerve. Positive " AIN, PIN, ulnar nerve motor. Positive pulses. Positive Impingement signs. Good strength in triceps, biceps, deltoid, wrist extensors and wrist flexors. Tender to palpation to the anterior aspect of the shoulder and down the arm.      Large Joint Arthrocentesis: L subacromial bursa  Date/Time: 4/22/2024 2:08 PM  Consent given by: patient  Site marked: site marked  Timeout: Immediately prior to procedure a time out was called to verify the correct patient, procedure, equipment, support staff and site/side marked as required   Supporting Documentation  Indications: pain   Procedure Details  Location: shoulder - L subacromial bursa  Preparation: Patient was prepped and draped in the usual sterile fashion  Needle size: 22 G  Medications administered: 5 mL lidocaine 1 %; 40 mg triamcinolone acetonide 40 MG/ML  Patient tolerance: patient tolerated the procedure well with no immediate complications    This injection documentation was Scribed for Stevo Zeng MD by Mariela Chapman.  04/22/24   14:08 EDT        Imaging Results (Most Recent)       None             Result Review :         XR Shoulder 2+ View Left    Result Date: 3/28/2024  Narrative: XR SHOULDER 2+ VW LEFT-  Date of Exam: 3/28/2024 2:51 PM  Indication: Shoulder pain; M25.532-Pain in left wrist; M25.512-Pain in left shoulder; G89.29-Other chronic pain  Comparison: None available.  Findings: There is widening of the acromioclavicular interval that may be postsurgical. There is some spurring off the humeral head. There is no dislocation of the humeral head from the glenoid.      Impression: Impression: 1.  Evidence for degenerative change. 2.  Widening of the acromioclavicular interval which could relate to prior surgery and should be correlated with that history   Electronically Signed By-Phoenix Daniels MD On:3/28/2024 4:51 PM      XR Wrist 3+ View Left    Result Date: 3/28/2024  Narrative: XR WRIST 3+ VW LEFT-  Date of Exam: 3/28/2024 3:00 PM  Indication:  Wrist pain; M25.532-Pain in left wrist; M25.512-Pain in left shoulder; G89.29-Other chronic pain  Comparison: None available.  Findings: Volar plate and screw fixation of the distal radius. No evidence of hardware complication. Well-corticated ossific fragment adjacent to the ulnar styloid, possibly sequela of remote trauma. No evidence of acute fracture. Mild base of thumb degenerative change. Soft tissues are unremarkable.      Impression: Impression: Volar plate and screw fixation of the distal radius without evidence of hardware complication. No acute abnormality.   Electronically Signed By-Jacek Zhang MD On:3/28/2024 4:41 PM              Assessment and Plan     Diagnoses and all orders for this visit:    1. Left shoulder pain, unspecified chronicity (Primary)        Discussed the treatment plan with the patient. I reviewed the X-rays that were obtained 3/28/24 with the patient.     Discussed the risks and benefits of conservative measures. The patient expressed understanding and wished to proceed with a left shoulder steroid injection.  She tolerated the injection well.     Call or return if worsening symptoms.    Follow Up     4-6 weeks to assess if injection was helpful.       Patient was given instructions and counseling regarding her condition or for health maintenance advice. Please see specific information pulled into the AVS if appropriate.     Scribed for Stevo Zeng MD by Diane Prescott MA.  04/22/24   13:59 EDT      I have personally performed the services described in this document as scribed by the above individual and it is both accurate and complete. Stevo Zeng MD 04/22/24

## 2024-04-26 ENCOUNTER — HOSPITAL ENCOUNTER (OUTPATIENT)
Dept: MRI IMAGING | Facility: HOSPITAL | Age: 69
Discharge: HOME | End: 2024-04-26
Payer: MEDICARE

## 2024-04-26 DIAGNOSIS — E11.51 SMALL VESSEL ARTERIAL DISEASE DUE TO TYPE 2 DIABETES MELLITUS: ICD-10-CM

## 2024-04-26 LAB
CREAT BLDA-MCNC: 0.8 MG/DL (ref 0.6–1.3)
EGFRCR SERPLBLD CKD-EPI 2021: 80.4 ML/MIN/1.73

## 2024-04-26 PROCEDURE — 70544 MR ANGIOGRAPHY HEAD W/O DYE: CPT

## 2024-04-26 PROCEDURE — 82565 ASSAY OF CREATININE: CPT

## 2024-04-26 PROCEDURE — A9577 INJ MULTIHANCE: HCPCS | Performed by: PSYCHIATRY & NEUROLOGY

## 2024-04-26 PROCEDURE — 70548 MR ANGIOGRAPHY NECK W/DYE: CPT

## 2024-04-26 PROCEDURE — 0 GADOBENATE DIMEGLUMINE 529 MG/ML SOLUTION: Performed by: PSYCHIATRY & NEUROLOGY

## 2024-04-26 RX ADMIN — GADOBENATE DIMEGLUMINE 20 ML: 529 INJECTION, SOLUTION INTRAVENOUS at 14:09

## 2024-05-08 ENCOUNTER — HOSPITAL ENCOUNTER (OUTPATIENT)
Dept: MRI IMAGING | Facility: HOSPITAL | Age: 69
Discharge: HOME | End: 2024-05-08
Admitting: NURSE PRACTITIONER
Payer: MEDICARE

## 2024-05-08 DIAGNOSIS — M47.816 SPONDYLOSIS OF LUMBAR REGION WITHOUT MYELOPATHY OR RADICULOPATHY: ICD-10-CM

## 2024-05-08 DIAGNOSIS — M43.17 SPONDYLOLISTHESIS OF LUMBOSACRAL REGION: ICD-10-CM

## 2024-05-08 DIAGNOSIS — M54.50 LOW BACK PAIN, UNSPECIFIED BACK PAIN LATERALITY, UNSPECIFIED CHRONICITY, UNSPECIFIED WHETHER SCIATICA PRESENT: ICD-10-CM

## 2024-05-08 PROCEDURE — 72148 MRI LUMBAR SPINE W/O DYE: CPT

## 2024-05-30 NOTE — PROGRESS NOTES
"Chief Complaint  Follow-up and Pain of the Left Shoulder    Subjective      Jada Butler presents to [facility] for follow up of her left shoulder.  Patient was last seen in office by Dr. Zeng on 4/22/2024 and received a left shoulder steroid injection.  Patient also had a left shoulder MRI which was reviewed during the last office visit.  Patient has a remote history of a left shoulder arthroscopy with subacromial decompression and distal clavicle resection performed by Dr. Zeng on 4/28/2022.     The patient states the previous left shoulder steroid injection did not relieve any of her pain.  She states that she has pain mainly at night and laying on her left shoulder specifically.  Patient reports that her range of motion is good but she still having this persistent, burning sensation on the left shoulder.  She denies the pain radiating anywhere.    Allergies   Allergen Reactions    Amoxicillin-Pot Clavulanate GI Intolerance    Meloxicam Other (See Comments)     Severe body aches        Objective     Vital Signs:   Vitals:    06/03/24 1410   BP: 122/78   Pulse: 91   SpO2: 95%   Height: 160 cm (63\")     Body mass index is 30.65 kg/m².    I reviewed the patient's chief complaint, history of present illness, review of systems, past medical history, surgical history, family history, social history, medications, and allergy list.     REVIEW OF SYSTEMS    Constitutional: Denies fevers, chills, weight loss  Cardiovascular: Denies chest pain, shortness of breath  Skin: Denies rashes, acute skin changes  Neurologic: Denies headache, loss of consciousness  MSK: Left shoulder pain.     Ortho Exam  General: Alert. No acute distress.  Left upper Extremity: 180 degrees active elevation. External rotation to 65 degrees. Internal rotation to lumbar spine.  Pinpoint pain located along left trapezius muscle.  Demonstrates intact active elbow ROM. Demonstrates intact active wrist ROM. Sensation " intact. Palpable radial pulse. Neurovascularly intact.            Imaging Results (Most Recent)       None                Assessment and Plan   Diagnoses and all orders for this visit:    1. Strain of left trapezius muscle, subsequent encounter (Primary)  -     cyclobenzaprine (FLEXERIL) 5 MG tablet; Take 1 tablet by mouth At Night As Needed for Muscle Spasms.  Dispense: 15 tablet; Refill: 0  -     Ambulatory Referral to Physical Therapy    2. Left shoulder pain, unspecified chronicity  -     cyclobenzaprine (FLEXERIL) 5 MG tablet; Take 1 tablet by mouth At Night As Needed for Muscle Spasms.  Dispense: 15 tablet; Refill: 0  -     Ambulatory Referral to Physical Therapy    3. Trigger point of left shoulder region  -     Ambulatory Referral to Physical Therapy         Jada Butler presents to [facility] for her left shoulder.  Patient has a remote history of a left shoulder arthroscopy with subacromial decompression and distal clavicle resection performed Dr. Zeng on 4/28/2022.  Patient was last seen evaluated in office on 4/22/2024 and received a left shoulder steroid injection.  Patient reports she did not have significant relief from previous left shoulder steroid injection.  Suspicious that patient has developed a left shoulder trigger point due to pinpoint pain and discussed treatment options for that.  Patient elected to proceed with referral to physical therapy for possible dry needling and other modalities to help alleviate pain/muscle tension.  Also discussed pain management referral for trigger point injections however patient deferred at this time.  Discussed the option for oral steroids however patient deferred.  Patient requested a few doses of muscle relaxer to help with pain, discussed precautions regarding muscle relaxer medication and appropriate use.    Patient will follow-up as needed.    Tobacco Use: Medium Risk (6/3/2024)    Patient History     Smoking Tobacco Use:  Former     Smokeless Tobacco Use: Never     Passive Exposure: Past     Patient reports they have a history of tobacco use; encouraged continued tobacco cessation for further health benefits.            Follow Up   Return if symptoms worsen or fail to improve.  There are no Patient Instructions on file for this visit.  Patient was given instructions and counseling regarding her condition or for health maintenance advice. Please see specific information pulled into the AVS if appropriate.       Dictated Utilizing Dragon Dictation. Please note that portions of this note were completed with a voice recognition program. Part of this note may be an electronic transcription/translation of spoken language to printed text using the Dragon Dictation System.

## 2024-06-03 ENCOUNTER — OFFICE VISIT (OUTPATIENT)
Dept: ORTHOPEDIC SURGERY | Facility: CLINIC | Age: 69
End: 2024-06-03
Payer: MEDICARE

## 2024-06-03 VITALS
BODY MASS INDEX: 30.65 KG/M2 | HEART RATE: 91 BPM | OXYGEN SATURATION: 95 % | SYSTOLIC BLOOD PRESSURE: 122 MMHG | HEIGHT: 63 IN | DIASTOLIC BLOOD PRESSURE: 78 MMHG

## 2024-06-03 DIAGNOSIS — S46.812D STRAIN OF LEFT TRAPEZIUS MUSCLE, SUBSEQUENT ENCOUNTER: Primary | ICD-10-CM

## 2024-06-03 DIAGNOSIS — M25.512 TRIGGER POINT OF LEFT SHOULDER REGION: ICD-10-CM

## 2024-06-03 DIAGNOSIS — M25.512 LEFT SHOULDER PAIN, UNSPECIFIED CHRONICITY: ICD-10-CM

## 2024-06-03 PROCEDURE — 1160F RVW MEDS BY RX/DR IN RCRD: CPT

## 2024-06-03 PROCEDURE — 99214 OFFICE O/P EST MOD 30 MIN: CPT

## 2024-06-03 PROCEDURE — 1159F MED LIST DOCD IN RCRD: CPT

## 2024-06-03 RX ORDER — CYCLOBENZAPRINE HCL 5 MG
5 TABLET ORAL NIGHTLY PRN
Qty: 15 TABLET | Refills: 0 | Status: SHIPPED | OUTPATIENT
Start: 2024-06-03

## 2024-06-12 ENCOUNTER — OFFICE VISIT (OUTPATIENT)
Dept: NEUROSURGERY | Facility: CLINIC | Age: 69
End: 2024-06-12
Payer: MEDICARE

## 2024-06-12 VITALS
DIASTOLIC BLOOD PRESSURE: 72 MMHG | HEIGHT: 63 IN | BODY MASS INDEX: 31.36 KG/M2 | HEART RATE: 97 BPM | SYSTOLIC BLOOD PRESSURE: 135 MMHG | WEIGHT: 177 LBS

## 2024-06-12 DIAGNOSIS — M48.061 SPINAL STENOSIS AT L4-L5 LEVEL: ICD-10-CM

## 2024-06-12 DIAGNOSIS — M43.16 SPONDYLOLISTHESIS AT L4-L5 LEVEL: Primary | ICD-10-CM

## 2024-06-12 DIAGNOSIS — M47.816 LUMBAR FACET ARTHROPATHY: ICD-10-CM

## 2024-06-12 NOTE — PROGRESS NOTES
"Chief Complaint  Back Pain and Hip Pain (Bilateral )    Subjective          Jada Butler who is a 68 y.o. year old female who presents to [facility] for Evaluation of the Spine.     The patient complains of pain located in the Lumbar Spine.  Patients states the pain has been present for 1 year.  The pain came on gradually.  The pain scaled level is 0.  The pain  extends to the bilateral hips, but does not radiate into the upper extremities .  The pain is Intermittent and described as  \"just painful\" .  The pain is worse at no particular time of day. Patient states Rest and Tylenol makes the pain better.  Patient states  doing work, vacuuming, pulling weeds makes the pain worse.    Associated Symptoms Include:  Denies.  Conservative Interventions Include: Pain Medications that were somewhat effective., NSAIDs that were ineffective., Gabapentin that was ineffective., Muscle Relaxants that were ineffective., and Tylenol which is somewhat effective.    Was this the result of an injury or accident? : No    History of Previous Spinal Surgery?: Yes.  Lumbar, Date 2020, and one other before that, right L5-S1 MILD.     reports that she has quit smoking. She has been exposed to tobacco smoke. She has never used smokeless tobacco.    Review of Systems   Musculoskeletal:  Positive for back pain.        Objective   Vital Signs:   /72 (BP Location: Right arm, Patient Position: Sitting, Cuff Size: Adult)   Pulse 97   Ht 160 cm (63\")   Wt 80.3 kg (177 lb)   BMI 31.35 kg/m²       Physical Exam  Constitutional:       Appearance: Normal appearance. She is obese.   Pulmonary:      Effort: Pulmonary effort is normal.   Musculoskeletal:         General: Tenderness (right lumbar area) present.      Comments: SLR negative bilaterally   Neurological:      General: No focal deficit present.      Mental Status: She is alert and oriented to person, place, and time.      Sensory: No " sensory deficit.      Motor: No weakness.      Deep Tendon Reflexes: Reflexes normal.   Psychiatric:         Mood and Affect: Mood normal.         Behavior: Behavior normal.        Neurologic Exam     Mental Status   Oriented to person, place, and time.        Result Review     I have personally interpreted the MRI of the lumbar spine without contrast from 5/8/2024 which shows anterolisthesis of L4 on L5, multilevel spondylosis and facet arthritis.  There is severe central canal stenosis at L4-L5 with moderate bilateral foraminal narrowing.     Assessment and Plan    Diagnoses and all orders for this visit:    1. Spondylolisthesis at L4-L5 level (Primary)  -     Ambulatory Referral to Pain Management    2. Spinal stenosis at L4-L5 level  -     Ambulatory Referral to Pain Management    3. Lumbar facet arthropathy  -     Ambulatory Referral to Pain Management    She has pain mostly in the back and hips.    She has stenosis at L4-L5. We discussed that surgery is unlikely to help back pain.    She may benefit from LESI vs MBBs/RFA in the lumbar spine. I will refer her to [facility] in [place] to discuss these treatment options.    We discussed PT, which she is deferring in favor of continuing gym exercises.    The patient was counseled on basic recommendations for the reduction and prevention of back, neck, or spine pain in association with spinal disorders, including: cessation/avoidance of nicotine use, maintenance of a healthy BMI and weight, focusing on building/maintaining core strength through core exercise, and avoidance of activities which worsen the pain. The patient will monitor for changes in symptoms and notify our clinic of these changes as needed.    She will follow-up here PRN.      Follow Up   Return if symptoms worsen or fail to improve.  Patient was given instructions and counseling regarding her condition or for health maintenance advice. Please see specific information pulled into the AVS if  appropriate.

## 2024-06-13 ENCOUNTER — PATIENT ROUNDING (BHMG ONLY) (OUTPATIENT)
Dept: NEUROSURGERY | Facility: CLINIC | Age: 69
End: 2024-06-13
Payer: MEDICARE

## 2024-06-21 ENCOUNTER — TRANSCRIBE ORDERS (OUTPATIENT)
Dept: ADMINISTRATIVE | Facility: HOSPITAL | Age: 69
End: 2024-06-21
Payer: MEDICARE

## 2024-06-21 DIAGNOSIS — N60.01 SOLITARY CYST OF BREAST, RIGHT: Primary | ICD-10-CM

## 2024-07-02 ENCOUNTER — TRANSCRIBE ORDERS (OUTPATIENT)
Dept: ADMINISTRATIVE | Facility: HOSPITAL | Age: 69
End: 2024-07-02
Payer: MEDICARE

## 2024-07-02 ENCOUNTER — HOSPITAL ENCOUNTER (OUTPATIENT)
Dept: ULTRASOUND IMAGING | Facility: HOSPITAL | Age: 69
Discharge: HOME | End: 2024-07-02
Payer: MEDICARE

## 2024-07-02 ENCOUNTER — HOSPITAL ENCOUNTER (OUTPATIENT)
Dept: MAMMOGRAPHY | Facility: HOSPITAL | Age: 69
Discharge: HOME | End: 2024-07-02
Payer: MEDICARE

## 2024-07-02 DIAGNOSIS — N60.01 SOLITARY CYST OF RIGHT BREAST: ICD-10-CM

## 2024-07-02 DIAGNOSIS — N60.01 SOLITARY CYST OF BREAST, RIGHT: ICD-10-CM

## 2024-07-02 DIAGNOSIS — N60.01 SOLITARY CYST OF RIGHT BREAST: Primary | ICD-10-CM

## 2024-07-02 PROCEDURE — 77066 DX MAMMO INCL CAD BI: CPT

## 2024-07-02 PROCEDURE — 76642 ULTRASOUND BREAST LIMITED: CPT

## 2024-07-02 PROCEDURE — G0279 TOMOSYNTHESIS, MAMMO: HCPCS

## 2024-07-09 ENCOUNTER — TRANSCRIBE ORDERS (OUTPATIENT)
Dept: ADMINISTRATIVE | Facility: HOSPITAL | Age: 69
End: 2024-07-09
Payer: MEDICARE

## 2024-07-09 ENCOUNTER — HOSPITAL ENCOUNTER (OUTPATIENT)
Dept: GENERAL RADIOLOGY | Facility: HOSPITAL | Age: 69
Discharge: HOME | End: 2024-07-09
Payer: MEDICARE

## 2024-07-09 DIAGNOSIS — R07.9 CHEST PAIN, UNSPECIFIED TYPE: Primary | ICD-10-CM

## 2024-07-09 DIAGNOSIS — R07.9 CHEST PAIN, UNSPECIFIED TYPE: ICD-10-CM

## 2024-07-09 PROCEDURE — 71046 X-RAY EXAM CHEST 2 VIEWS: CPT

## 2024-07-09 PROCEDURE — 71100 X-RAY EXAM RIBS UNI 2 VIEWS: CPT

## 2024-07-29 ENCOUNTER — APPOINTMENT (OUTPATIENT)
Dept: CT IMAGING | Facility: HOSPITAL | Age: 69
End: 2024-07-29
Payer: MEDICARE

## 2024-07-29 LAB
ALBUMIN SERPL-MCNC: 4.3 G/DL (ref 3.5–5.2)
ALBUMIN/GLOB SERPL: 1.5 G/DL
ALP SERPL-CCNC: 83 U/L (ref 39–117)
ALT SERPL W P-5'-P-CCNC: 27 U/L (ref 1–33)
ANION GAP SERPL CALCULATED.3IONS-SCNC: 14.1 MMOL/L (ref 5–15)
AST SERPL-CCNC: 35 U/L (ref 1–32)
BASOPHILS # BLD AUTO: 0.11 10*3/MM3 (ref 0–0.2)
BASOPHILS NFR BLD AUTO: 1.3 % (ref 0–1.5)
BILIRUB SERPL-MCNC: 0.5 MG/DL (ref 0–1.2)
BILIRUB UR QL STRIP: NEGATIVE
BUN SERPL-MCNC: 23 MG/DL (ref 8–23)
BUN/CREAT SERPL: 29.5 (ref 7–25)
CALCIUM SPEC-SCNC: 9.3 MG/DL (ref 8.6–10.5)
CHLORIDE SERPL-SCNC: 99 MMOL/L (ref 98–107)
CLARITY UR: CLEAR
CO2 SERPL-SCNC: 24.9 MMOL/L (ref 22–29)
COLOR UR: YELLOW
CREAT SERPL-MCNC: 0.78 MG/DL (ref 0.57–1)
DEPRECATED RDW RBC AUTO: 47.4 FL (ref 37–54)
EGFRCR SERPLBLD CKD-EPI 2021: 82.9 ML/MIN/1.73
EOSINOPHIL # BLD AUTO: 0.18 10*3/MM3 (ref 0–0.4)
EOSINOPHIL NFR BLD AUTO: 2.1 % (ref 0.3–6.2)
ERYTHROCYTE [DISTWIDTH] IN BLOOD BY AUTOMATED COUNT: 16.5 % (ref 12.3–15.4)
GLOBULIN UR ELPH-MCNC: 2.9 GM/DL
GLUCOSE BLDC GLUCOMTR-MCNC: 201 MG/DL (ref 70–99)
GLUCOSE SERPL-MCNC: 194 MG/DL (ref 65–99)
GLUCOSE UR STRIP-MCNC: NEGATIVE MG/DL
HCT VFR BLD AUTO: 36.2 % (ref 34–46.6)
HGB BLD-MCNC: 11.3 G/DL (ref 12–15.9)
HGB UR QL STRIP.AUTO: NEGATIVE
HOLD SPECIMEN: NORMAL
HOLD SPECIMEN: NORMAL
IMM GRANULOCYTES # BLD AUTO: 0.04 10*3/MM3 (ref 0–0.05)
IMM GRANULOCYTES NFR BLD AUTO: 0.5 % (ref 0–0.5)
KETONES UR QL STRIP: NEGATIVE
LEUKOCYTE ESTERASE UR QL STRIP.AUTO: NEGATIVE
LYMPHOCYTES # BLD AUTO: 3.14 10*3/MM3 (ref 0.7–3.1)
LYMPHOCYTES NFR BLD AUTO: 36.4 % (ref 19.6–45.3)
MCH RBC QN AUTO: 24.8 PG (ref 26.6–33)
MCHC RBC AUTO-ENTMCNC: 31.2 G/DL (ref 31.5–35.7)
MCV RBC AUTO: 79.6 FL (ref 79–97)
MONOCYTES # BLD AUTO: 0.66 10*3/MM3 (ref 0.1–0.9)
MONOCYTES NFR BLD AUTO: 7.6 % (ref 5–12)
NEUTROPHILS NFR BLD AUTO: 4.5 10*3/MM3 (ref 1.7–7)
NEUTROPHILS NFR BLD AUTO: 52.1 % (ref 42.7–76)
NITRITE UR QL STRIP: NEGATIVE
NRBC BLD AUTO-RTO: 0 /100 WBC (ref 0–0.2)
PH UR STRIP.AUTO: 5.5 [PH] (ref 5–8)
PLATELET # BLD AUTO: 354 10*3/MM3 (ref 140–450)
PMV BLD AUTO: 9.7 FL (ref 6–12)
POTASSIUM SERPL-SCNC: 3.8 MMOL/L (ref 3.5–5.2)
PROT SERPL-MCNC: 7.2 G/DL (ref 6–8.5)
PROT UR QL STRIP: NEGATIVE
RBC # BLD AUTO: 4.55 10*6/MM3 (ref 3.77–5.28)
SODIUM SERPL-SCNC: 138 MMOL/L (ref 136–145)
SP GR UR STRIP: >1.03 (ref 1–1.03)
UROBILINOGEN UR QL STRIP: ABNORMAL
WBC NRBC COR # BLD AUTO: 8.63 10*3/MM3 (ref 3.4–10.8)
WHOLE BLOOD HOLD COAG: NORMAL
WHOLE BLOOD HOLD SPECIMEN: NORMAL

## 2024-07-29 PROCEDURE — 80053 COMPREHEN METABOLIC PANEL: CPT | Performed by: REGISTERED NURSE

## 2024-07-29 PROCEDURE — 74177 CT ABD & PELVIS W/CONTRAST: CPT

## 2024-07-29 PROCEDURE — 85025 COMPLETE CBC W/AUTO DIFF WBC: CPT | Performed by: REGISTERED NURSE

## 2024-07-29 PROCEDURE — 82948 REAGENT STRIP/BLOOD GLUCOSE: CPT

## 2024-07-29 PROCEDURE — 25510000001 IOPAMIDOL PER 1 ML: Performed by: REGISTERED NURSE

## 2024-07-29 PROCEDURE — 99285 EMERGENCY DEPT VISIT HI MDM: CPT

## 2024-07-29 PROCEDURE — 81003 URINALYSIS AUTO W/O SCOPE: CPT | Performed by: REGISTERED NURSE

## 2024-07-29 RX ADMIN — IOPAMIDOL 100 ML: 755 INJECTION, SOLUTION INTRAVENOUS at 20:54

## 2024-07-29 NOTE — ED PROVIDER NOTES
Time: 7:17 PM EDT  Date of encounter:  7/29/2024  Independent Historian/Clinical History and Information was obtained by:   Patient    History is limited by: N/A    Chief Complaint   Patient presents with    Hyperglycemia         History of Present Illness:  Patient is a 68 y.o. year old female who presents to the emergency department for evaluation of abdominal pain and high blood sugar.  Patient states her blood sugar at home was over 300 which caused her to go to urgent care.  She said there it was 260 and they Told her to come to the ER for further evaluation.  She denies nausea/vomiting, fever/chills.  LURDES Bolaños    Patient notes that she received corticosteroid injections in her back last week.  She also notes that she checked her blood sugar the past several days and her meter read high.  She did not realize this meant that her glucose level was greater than 600.  She became alarmed today when found it greater than 300.  She states she has had severe thirst and frequent urination.    Patient Care Team  Primary Care Provider: Rain Mccall APRN    Past Medical History:     Allergies   Allergen Reactions    Amoxicillin-Pot Clavulanate GI Intolerance    Meloxicam Other (See Comments) and Rash     Severe body aches    Amoxicillin Rash    Cefdinir Diarrhea     Past Medical History:   Diagnosis Date    Anesthesia     pt states she has been slow at waking, but has also woke up during colonoscopy. Also states she  acts silly for a few days afterwards.    Arthritis     Bilateral carpal tunnel syndrome 02/22/2023    CTS (carpal tunnel syndrome)     Diabetes mellitus     does not check  bs    Essential tremor 9/5/2023    Fatty liver     no current issues    Hyperlipidemia     Hypertension     IBS (irritable bowel syndrome)     Impingement syndrome of left shoulder     Neuropathy in diabetes     PONV (postoperative nausea and vomiting)     Shingles      Past Surgical History:   Procedure Laterality Date    BACK  SURGERY      herniated disc lumbar    BREAST SURGERY      breast reduction    CARPAL TUNNEL RELEASE Left 05/11/2023    Procedure: CARPAL TUNNEL RELEASE;  Surgeon: Stevo Zeng MD;  Location: [facility];  Service: Orthopedics;  Laterality: Left;    CHOLECYSTECTOMY      COLONOSCOPY      HERNIA REPAIR      HYSTERECTOMY      KNEE SURGERY      NOSE SURGERY      x2    SHOULDER ARTHROSCOPY W/ ROTATOR CUFF REPAIR Left 04/28/2022    Procedure: LEFT SHOULDER ARTHROSCOPY , SUBACROMIAL DECOMPRESSION, DISTAL CLAVICULECTOMY;  Surgeon: Stevo Zeng MD;  Location: [facility];  Service: Orthopedics;  Laterality: Left;    TUBAL ABDOMINAL LIGATION      WRIST SURGERY       Family History   Problem Relation Age of Onset    Dementia Mother     Stroke Mother     Stroke Father     Malig Hyperthermia Neg Hx        Home Medications:  Prior to Admission medications    Medication Sig Start Date End Date Taking? Authorizing Provider   amantadine (SYMMETREL) 100 MG tablet  6/26/23   Varghese Russell MD   amLODIPine (NORVASC) 10 MG tablet Take 1 tablet by mouth Daily.    Varghese Russell MD   Aspercreme Lidocaine 4 % cream apply to rectum FOUR TIMES DAILY AS NEEDED 7/24/24   Varghese Russell MD   baclofen (LIORESAL) 10 MG tablet Take 1 tablet by mouth 3 (Three) Times a Day As Needed for Muscle Spasms. 4/25/23   Varghese Russell MD   cholestyramine (QUESTRAN) 4 g packet DISSOLVE CONTENTS OF 1 PACKET IN 2 TO 5 OUNCES OF WATER OR NON-CARBONATED BEVERAGE AND DRINK ONCE DAILY 6/2/22   Varghese Russell MD   cyclobenzaprine (FLEXERIL) 5 MG tablet Take 1 tablet by mouth At Night As Needed for Muscle Spasms. 6/3/24   Maria Alejandra Kincaid APRN   diclofenac (VOLTAREN) 50 MG EC tablet Take 1 tablet by mouth 2 (Two) Times a Day.    Varghese Russell MD   diclofenac-miSOPROStol (ARTHROTEC 50) 50-0.2 MG EC tablet Take 1 tablet by mouth 2 (Two) Times a Day.    Varghese Russell MD   dicyclomine (BENTYL) 20 MG tablet  Take 1 tablet by mouth Every 6 (Six) Hours.    Varghese Russell MD   FLUoxetine (PROzac) 20 MG capsule Take 2 capsules by mouth Daily.    Varghese Russell MD   gabapentin (NEURONTIN) 100 MG capsule TAKE 2 CAPSULES 2 TIMES A DAY 1/29/23   Varghese Russell MD   glimepiride (AMARYL) 4 MG tablet Take 1 tablet by mouth 2 (Two) Times a Day.    Varghese Russell MD   HYDROcodone-acetaminophen (NORCO) 7.5-325 MG per tablet take 1 tablet by mouth every 12 hours as needed for 30 days    Varghese Russell MD   losartan (COZAAR) 100 MG tablet Take 1 tablet by mouth Daily.    Varghese Russell MD   metFORMIN (GLUCOPHAGE) 500 MG tablet Take 1 tablet by mouth 2 (Two) Times a Day. 10/15/21   Emergency, Nurse Epic, RN   Narcan 4 MG/0.1ML nasal spray Take 1 spray by nasal route as directed. 6/21/24   Varghese Russell MD   nystatin (MYCOSTATIN) 790440 UNIT/GM cream apply to rectum TWICE DAILY 7/24/24   Varghese Russell MD   omeprazole (priLOSEC) 20 MG capsule Take 1 capsule by mouth Daily.    Varghese Russell MD   ondansetron ODT (ZOFRAN-ODT) 4 MG disintegrating tablet Place 1 tablet on the tongue 4 (Four) Times a Day As Needed for Nausea or Vomiting. 11/14/23   Nancy Sultana APRN   phenazopyridine (PYRIDIUM) 100 MG tablet Take 1 tablet by mouth 3 (Three) Times a Day. 8/30/23   Varghese Russell MD   pravastatin (PRAVACHOL) 40 MG tablet Take 1 tablet by mouth every night at bedtime.    Varghese Russell MD   propranolol (INDERAL) 40 MG tablet Titrate as directed weekly and take up to 2 tablets in the morning and 3 tablets in the evening. 9/5/23   Chai Farrell MD   SudoGest 60 MG tablet Take 1 tablet by mouth Every 12 (Twelve) Hours. 4/25/23   Varghese Russell MD   traMADol (ULTRAM) 50 MG tablet Take 1 tablet by mouth 4 (Four) Times a Day As Needed.    Varghese Russell MD   traZODone (DESYREL) 50 MG tablet Take 1 tablet by mouth every night at bedtime.     Varghese Russell MD   Tyrvaya 0.03 MG/ACT solution 1 spray by Each Nare route 2 (Two) Times a Day. 10/30/23   Varghese Russell MD   vitamin D (ERGOCALCIFEROL) 1.25 MG (03011 UT) capsule capsule Take 1 capsule by mouth 1 (One) Time Per Week. 5/31/23   Varghese Russell MD   amLODIPine (NORVASC) 5 MG tablet Take 1 tablet by mouth Every Night. 9/9/21 7/29/24  Emergency, Nurse Alicia RN   diclofenac (VOLTAREN) 50 MG EC tablet Take 1 tablet by mouth Every 12 (Twelve) Hours. 5/31/23 7/29/24  Varghese Russell MD   dicyclomine (BENTYL) 20 MG tablet Take 1 tablet by mouth Every 6 (Six) Hours. 11/14/23 7/29/24  Nancy Sultana APRN   FLUoxetine (PROzac) 20 MG capsule Take 2 capsules by mouth Daily. 2 tab 10/21/21 7/29/24  Emergency, Nurse Alicia, RN   glimepiride (AMARYL) 2 MG tablet glimepiride 2 mg oral tablet take 1 tablet (2 mg) by oral route once daily   Active  7/29/24  Emergency, Nurse Alicia RN   losartan (COZAAR) 100 MG tablet Take 1 tablet by mouth Every Night. 9/9/21 7/29/24  Emergency, Nurse Alicia RN   pravastatin (PRAVACHOL) 40 MG tablet Take 1 tablet by mouth Every Night.  7/29/24  Varghese Russell MD   traZODone (DESYREL) 50 MG tablet Take 1 tablet by mouth every night at bedtime. 1/23/23 7/29/24  Varghese Russell MD   valACYclovir (VALTREX) 500 MG tablet Take 1 tablet by mouth Every 12 (Twelve) Hours. 11/23/22 7/29/24  Varghese Russell MD        Social History:   Social History     Tobacco Use    Smoking status: Former     Passive exposure: Past    Smokeless tobacco: Never   Vaping Use    Vaping status: Never Used   Substance Use Topics    Alcohol use: Yes     Comment: Ocassionally    Drug use: Yes     Types: Marijuana         Review of Systems:  Review of Systems   Constitutional:  Negative for chills and fever.   HENT:  Negative for congestion, ear pain and sore throat.    Eyes:  Negative for pain.   Respiratory:  Negative for cough, chest tightness and shortness of breath.   "  Cardiovascular:  Negative for chest pain.   Gastrointestinal:  Positive for abdominal pain. Negative for diarrhea, nausea and vomiting.   Genitourinary:  Negative for flank pain and hematuria.   Musculoskeletal:  Negative for joint swelling.   Skin:  Negative for pallor.   Neurological:  Positive for dizziness. Negative for seizures and headaches.   All other systems reviewed and are negative.       Physical Exam:  /71 (BP Location: Right arm, Patient Position: Sitting)   Pulse 90   Temp 98.8 °F (37.1 °C) (Oral)   Resp 18   Ht 160 cm (63\")   Wt 77.6 kg (171 lb)   SpO2 97%   BMI 30.29 kg/m²         Physical Exam  Vitals and nursing note reviewed.   Constitutional:       General: She is not in acute distress.     Appearance: Normal appearance. She is not toxic-appearing.   HENT:      Head: Normocephalic and atraumatic.      Jaw: There is normal jaw occlusion.      Mouth/Throat:      Mouth: Mucous membranes are moist.   Eyes:      General: Lids are normal.      Extraocular Movements: Extraocular movements intact.      Conjunctiva/sclera: Conjunctivae normal.      Pupils: Pupils are equal, round, and reactive to light.   Cardiovascular:      Rate and Rhythm: Normal rate and regular rhythm.      Pulses: Normal pulses.      Heart sounds: Normal heart sounds.   Pulmonary:      Effort: Pulmonary effort is normal. No respiratory distress.      Breath sounds: Normal breath sounds. No wheezing or rhonchi.   Abdominal:      General: Abdomen is flat. There is no distension.      Palpations: Abdomen is soft.      Tenderness: There is no abdominal tenderness. There is no guarding or rebound.   Musculoskeletal:         General: Normal range of motion.      Cervical back: Normal range of motion and neck supple.      Right lower leg: No edema.      Left lower leg: No edema.   Skin:     General: Skin is warm and dry.   Neurological:      General: No focal deficit present.      Mental Status: She is alert and oriented to " person, place, and time. Mental status is at baseline.   Psychiatric:         Mood and Affect: Mood normal.         Behavior: Behavior normal.            Procedures:  Procedures      Medical Decision Making:      Comorbidities that affect care:    Diabetes, hyperlipidemia, hypertension, fatty liver, IBS, carpal tunnel    External Notes reviewed:    Previous Clinic Note: Outpatient neurology visit 6/12/2024      The following orders were placed and all results were independently analyzed by me:  Orders Placed This Encounter   Procedures    CT Abdomen Pelvis With Contrast    Comprehensive Metabolic Panel    Urinalysis With Culture If Indicated - Urine, Clean Catch    [place] Draw    CBC Auto Differential    Ambulatory Referral to Cardiothoracic Surgery    Ambulatory Referral to Endocrinology    POC Glucose Once    POC Glucose Once    CBC & Differential    Green Top (Gel)    Lavender Top    Gold Top - SST    Light Blue Top       Medications Given in the Emergency Department:  Medications   iopamidol (ISOVUE-370) 76 % injection 100 mL (100 mL Intravenous Given 7/29/24 2054)        ED Course:    The patient was initially evaluated in the triage area where orders were placed. The patient was later dispositioned by Chai Schultz MD.      The patient was advised to stay for completion of workup which includes but is not limited to communication of labs and radiological results, reassessment and plan. The patient was advised that leaving prior to disposition by a provider could result in critical findings that are not communicated to the patient.          Labs:    Lab Results (last 24 hours)       Procedure Component Value Units Date/Time    POC Glucose Once [[id number]]  (Abnormal) Collected: 07/29/24 1737    Specimen: Blood Updated: 07/29/24 1740     Glucose 256 mg/dL      Comment: Serial Number: [id number]:  [id number]       POC Glucose Microcuvette [[id number]]  (Abnormal) Collected: 07/29/24 1738    Specimen: Blood  Updated: 07/29/24 1744     Glucose 256 mg/dL      Lot Number [id number]     Expiration Date 1,232,025    POC Glucose Once [[id number]]  (Abnormal) Collected: 07/29/24 1910    Specimen: Blood Updated: 07/29/24 1912     Glucose 201 mg/dL      Comment: Serial Number: [id number]:  [id number]       Comprehensive Metabolic Panel [[id number]]  (Abnormal) Collected: 07/29/24 1939    Specimen: Blood Updated: 07/29/24 2023     Glucose 194 mg/dL      BUN 23 mg/dL      Creatinine 0.78 mg/dL      Sodium 138 mmol/L      Potassium 3.8 mmol/L      Chloride 99 mmol/L      CO2 24.9 mmol/L      Calcium 9.3 mg/dL      Total Protein 7.2 g/dL      Albumin 4.3 g/dL      ALT (SGPT) 27 U/L      AST (SGOT) 35 U/L      Alkaline Phosphatase 83 U/L      Total Bilirubin 0.5 mg/dL      Globulin 2.9 gm/dL      A/G Ratio 1.5 g/dL      BUN/Creatinine Ratio 29.5     Anion Gap 14.1 mmol/L      eGFR 82.9 mL/min/1.73     Narrative:      GFR Normal >60  Chronic Kidney Disease <60  Kidney Failure <15      CBC & Differential [[id number]]  (Abnormal) Collected: 07/29/24 1939    Specimen: Blood Updated: 07/29/24 2002    Narrative:      The following orders were created for panel order CBC & Differential.  Procedure                               Abnormality         Status                     ---------                               -----------         ------                     CBC Auto Differential[[id number]]        Abnormal            Final result                 Please view results for these tests on the individual orders.    CBC Auto Differential [[id number]]  (Abnormal) Collected: 07/29/24 1939    Specimen: Blood Updated: 07/29/24 2002     WBC 8.63 10*3/mm3      RBC 4.55 10*6/mm3      Hemoglobin 11.3 g/dL      Hematocrit 36.2 %      MCV 79.6 fL      MCH 24.8 pg      MCHC 31.2 g/dL      RDW 16.5 %      RDW-SD 47.4 fl      MPV 9.7 fL      Platelets 354 10*3/mm3      Neutrophil % 52.1 %      Lymphocyte % 36.4 %      Monocyte % 7.6 %      Eosinophil % 2.1  %      Basophil % 1.3 %      Immature Grans % 0.5 %      Neutrophils, Absolute 4.50 10*3/mm3      Lymphocytes, Absolute 3.14 10*3/mm3      Monocytes, Absolute 0.66 10*3/mm3      Eosinophils, Absolute 0.18 10*3/mm3      Basophils, Absolute 0.11 10*3/mm3      Immature Grans, Absolute 0.04 10*3/mm3      nRBC 0.0 /100 WBC     Urinalysis With Culture If Indicated - Urine, Clean Catch [[id number]]  (Abnormal) Collected: 07/29/24 2213    Specimen: Urine, Clean Catch Updated: 07/29/24 2223     Color, UA Yellow     Appearance, UA Clear     pH, UA 5.5     Specific Gravity, UA >1.030     Glucose, UA Negative     Ketones, UA Negative     Bilirubin, UA Negative     Blood, UA Negative     Protein, UA Negative     Leuk Esterase, UA Negative     Nitrite, UA Negative     Urobilinogen, UA 0.2 E.U./dL    Narrative:      In absence of clinical symptoms, the presence of pyuria, bacteria, and/or nitrites on the urinalysis result does not correlate with infection.  Urine microscopic not indicated.    POC Glucose Once [[id number]]  (Abnormal) Collected: 07/30/24 0054    Specimen: Blood Updated: 07/30/24 0057     Glucose 192 mg/dL      Comment: Serial Number: [id number]:  [id number]                Imaging:    CT Abdomen Pelvis With Contrast    Result Date: 7/29/2024  CT ABDOMEN PELVIS W CONTRAST Date of exam: 7/29/2024 8:49, P.M. EDT. Indication: RUQ abdominal pain. Comparisons: 11/14/2023; 11/10/2021. TECHNIQUE: Axial CT images were obtained of the abdomen and pelvis after the uneventful intravenous administration of 100 mL Isovue-370 nonionic iodinated contrast agent. Reconstructed 2D coronal and sagittal images were also obtained. Automated exposure control and iterative construction methods were used. No oral contrast agent was administered for the study.  FINDINGS: Diffuse hepatic steatosis is seen with hepatomegaly, as before. No splenomegaly. The patient has undergone cholecystectomy and hysterectomy. There may be some degree of  fatty infiltration of the liver. There are stable renal cysts. Degenerative changes involve the imaged spine, especially at L4-5. No acute infiltrate is seen in the lung bases. There is mild fusiform dilatation of the ascending aorta, measuring about 3.9 cm in greatest diameter, seen previously. No acute appendicitis or diverticulitis. No renal stones or hydronephrosis. No acute pyelonephritis. No acute findings are appreciated. No significant interval change is seen since prior exams.     No acute findings are appreciated by enhanced CT examination of the abdomen and pelvis. Please see above comments for further detail.    Please note that portions of this note were completed with a voice recognition program.  Electronically Signed: Girma Hinojosa MD  7/29/2024 9:06 PM EDT  Workstation ID: [id number]       Differential Diagnosis and Discussion:      Metabolic: Differential diagnosis includes but is not limited to hypertension, hyperglycemia, hyperkalemia, hypocalcemia, metabolic acidosis, hypokalemia, hypoglycemia, malnutrition, hypothyroidism, hyperthyroidism, and adrenal insufficiency.     All labs were reviewed and interpreted by me.  CT scan radiology impression was interpreted by me.    [facility]               Patient Care Considerations:    NARCOTICS: I considered prescribing opiate pain medication as an outpatient, however no pain control required in the ED.      Consultants/Shared Management Plan:    None    Social Determinants of Health:    Patient is independent, reliable, and has access to care.       Disposition and Care Coordination:    Discharged: The patient is suitable and stable for discharge with no need for consideration of admission.    I have explained the patient´s condition, diagnoses and treatment plan based on the information available to me at this time. I have answered questions and addressed any concerns. The patient has a good  understanding of the patient´s diagnosis, condition, and treatment  plan as can be expected at this point. The vital signs have been stable. The patient´s condition is stable and appropriate for discharge from the emergency department.      The patient will pursue further outpatient evaluation with the primary care physician or other designated or consulting physician as outlined in the discharge instructions. They are agreeable to this plan of care and follow-up instructions have been explained in detail. The patient has received these instructions in written format and has expressed an understanding of the discharge instructions. The patient is aware that any significant change in condition or worsening of symptoms should prompt an immediate return to this or the closest emergency department or call to 911.  I have explained discharge medications and the need for follow up with the patient/caretakers. This was also printed in the discharge instructions. Patient was discharged with the following medications and follow up:      Medication List      No changes were made to your prescriptions during this visit.      Rain Mccall, APRN  [address] [place] [id number] [phone]    Schedule an appointment as soon as possible for a visit          Final diagnoses:   Hyperglycemia   Aneurysm of ascending aorta without rupture        ED Disposition       ED Disposition   Discharge    Condition   Stable    Comment   --               This medical record created using voice recognition software.             Chai Schultz MD  07/30/24 0705

## 2024-07-30 ENCOUNTER — HOSPITAL ENCOUNTER (EMERGENCY)
Facility: HOSPITAL | Age: 69
Discharge: HOME | End: 2024-07-30
Attending: EMERGENCY MEDICINE
Payer: MEDICARE

## 2024-07-30 VITALS
HEART RATE: 90 BPM | WEIGHT: 171 LBS | SYSTOLIC BLOOD PRESSURE: 131 MMHG | TEMPERATURE: 98.8 F | HEIGHT: 63 IN | RESPIRATION RATE: 18 BRPM | BODY MASS INDEX: 30.3 KG/M2 | DIASTOLIC BLOOD PRESSURE: 71 MMHG | OXYGEN SATURATION: 97 %

## 2024-07-30 DIAGNOSIS — I71.21 ANEURYSM OF ASCENDING AORTA WITHOUT RUPTURE: ICD-10-CM

## 2024-07-30 DIAGNOSIS — R73.9 HYPERGLYCEMIA: Primary | ICD-10-CM

## 2024-07-30 LAB — GLUCOSE BLDC GLUCOMTR-MCNC: 192 MG/DL (ref 70–99)

## 2024-07-30 PROCEDURE — 82948 REAGENT STRIP/BLOOD GLUCOSE: CPT

## 2024-08-01 DIAGNOSIS — I77.810 AORTIC ECTASIA, THORACIC: Primary | ICD-10-CM

## 2024-08-12 ENCOUNTER — TELEPHONE (OUTPATIENT)
Dept: CARDIAC SURGERY | Facility: CLINIC | Age: 69
End: 2024-08-12
Payer: MEDICARE

## 2024-08-12 NOTE — TELEPHONE ENCOUNTER
Pt called and stated she had changed her mind and decided to go ahead and have the CT done and to be seen in our office with Dr. Dupree. Provided pt with centralized scheduling's phone number and informed her that the orders are placed for [facility] Sanchez CT chest WO cont. I also advised the pt once we receive the results we could call to schedule appt. Pt verbalized understanding.

## 2024-08-16 ENCOUNTER — HOSPITAL ENCOUNTER (OUTPATIENT)
Dept: CT IMAGING | Facility: HOSPITAL | Age: 69
Discharge: HOME | End: 2024-08-16
Payer: MEDICARE

## 2024-08-16 DIAGNOSIS — I77.810 AORTIC ECTASIA, THORACIC: ICD-10-CM

## 2024-08-16 PROCEDURE — 71250 CT THORAX DX C-: CPT

## 2024-10-01 ENCOUNTER — TELEPHONE (OUTPATIENT)
Dept: CARDIAC SURGERY | Facility: CLINIC | Age: 69
End: 2024-10-01
Payer: MEDICARE

## 2024-10-01 NOTE — TELEPHONE ENCOUNTER
Tried calling pt to see about r/s appt instead of canceling all together. If [facility] answers, it's okay to r/s next available.

## 2024-10-01 NOTE — TELEPHONE ENCOUNTER
----- Message from [facility] [employer] sent at 10/1/2024 10:16 AM EDT -----  Regarding: Appointment Cancellation Request  Contact: [phone]  Jada Butler would like to cancel the following appointments:    Mayur Dupree in [facility] ([phone]), 10/2/2024 12:15 PM    Comments:  I will not be making this appointment.

## 2024-10-03 ENCOUNTER — OFFICE VISIT (OUTPATIENT)
Dept: GASTROENTEROLOGY | Facility: CLINIC | Age: 69
End: 2024-10-03
Payer: MEDICARE

## 2024-10-03 VITALS
BODY MASS INDEX: 30.69 KG/M2 | SYSTOLIC BLOOD PRESSURE: 134 MMHG | HEART RATE: 92 BPM | WEIGHT: 173.2 LBS | HEIGHT: 63 IN | DIASTOLIC BLOOD PRESSURE: 74 MMHG

## 2024-10-03 DIAGNOSIS — K58.0 IRRITABLE BOWEL SYNDROME WITH DIARRHEA: Primary | ICD-10-CM

## 2024-10-03 DIAGNOSIS — K21.9 GASTROESOPHAGEAL REFLUX DISEASE, UNSPECIFIED WHETHER ESOPHAGITIS PRESENT: ICD-10-CM

## 2024-10-03 PROCEDURE — 1160F RVW MEDS BY RX/DR IN RCRD: CPT | Performed by: NURSE PRACTITIONER

## 2024-10-03 PROCEDURE — 1159F MED LIST DOCD IN RCRD: CPT | Performed by: NURSE PRACTITIONER

## 2024-10-03 PROCEDURE — G2211 COMPLEX E/M VISIT ADD ON: HCPCS | Performed by: NURSE PRACTITIONER

## 2024-10-03 PROCEDURE — 99214 OFFICE O/P EST MOD 30 MIN: CPT | Performed by: NURSE PRACTITIONER

## 2024-10-03 RX ORDER — LORATADINE 5 MG/1
5 TABLET, CHEWABLE ORAL DAILY
COMMUNITY

## 2024-10-03 RX ORDER — VALACYCLOVIR HYDROCHLORIDE 500 MG/1
1 TABLET, FILM COATED ORAL EVERY 12 HOURS SCHEDULED
COMMUNITY
Start: 2024-09-16

## 2024-10-03 RX ORDER — INSULIN GLARGINE 100 [IU]/ML
INJECTION, SOLUTION SUBCUTANEOUS
COMMUNITY
Start: 2024-08-29

## 2024-10-03 RX ORDER — FLURBIPROFEN SODIUM 0.3 MG/ML
SOLUTION/ DROPS OPHTHALMIC
COMMUNITY
Start: 2024-08-30 | End: 2024-10-03

## 2024-10-03 RX ORDER — DICYCLOMINE HCL 20 MG
20 TABLET ORAL 4 TIMES DAILY PRN
Qty: 120 TABLET | Refills: 3 | Status: SHIPPED | OUTPATIENT
Start: 2024-10-03

## 2024-10-03 RX ORDER — ASPIRIN 81 MG/1
81 TABLET, CHEWABLE ORAL DAILY
COMMUNITY

## 2024-10-03 RX ORDER — MONTELUKAST SODIUM 4 MG/1
1-2 TABLET, CHEWABLE ORAL 2 TIMES DAILY
Qty: 120 TABLET | Refills: 5 | Status: SHIPPED | OUTPATIENT
Start: 2024-10-03

## 2024-10-03 RX ORDER — SIMETHICONE 80 MG
80 TABLET,CHEWABLE ORAL EVERY 6 HOURS PRN
COMMUNITY

## 2024-10-03 RX ORDER — PREDNISONE 10 MG/1
TABLET ORAL
COMMUNITY
End: 2024-10-03

## 2024-10-03 NOTE — PROGRESS NOTES
Chief Complaint     Diarrhea    History of Present Illness     Jada Butler is a 68 y.o. female who presents to [facility] on referral from LURDES Ricks for a gastroenterology evaluation of diarrhea.      She reports diarrhea that's been present for many years.  States that it waxes and wanes.  Will sometimes experience abdominal pain with bowel movements. Admits intermittent fecal incontinence.      She has previously tried cholestyramine powder and felt that it helped, but she didn't like drinking the packet mixed with liquid.      Denies rectal bleeding.       Admits reflux that's controlled with omeprazole.  Denies dysphagia, nausea and vomiting.  Previous EGD per Dr. Shaffer in 2020 without sainz's esophagus.      History      Past Medical History:   Diagnosis Date    Anesthesia     pt states she has been slow at waking, but has also woke up during colonoscopy. Also states she  acts silly for a few days afterwards.    Arthritis     Bilateral carpal tunnel syndrome 02/22/2023    CTS (carpal tunnel syndrome)     Diabetes mellitus     does not check  bs    Essential tremor 09/05/2023    Fatty liver     no current issues    Hiatal hernia     Hyperlipidemia     Hypertension     IBS (irritable bowel syndrome)     Impingement syndrome of left shoulder     Neuropathy in diabetes     PONV (postoperative nausea and vomiting)     Shingles        Past Surgical History:   Procedure Laterality Date    BACK SURGERY      herniated disc lumbar    BREAST SURGERY      breast reduction    CARPAL TUNNEL RELEASE Left 05/11/2023    Procedure: CARPAL TUNNEL RELEASE;  Surgeon: Stevo Zeng MD;  Location: [facility] OR [facility] [place];  Service: Orthopedics;  Laterality: Left;    CHOLECYSTECTOMY      COLONOSCOPY      HERNIA REPAIR      HYSTERECTOMY      KNEE SURGERY      NOSE SURGERY      x2    SHOULDER ARTHROSCOPY W/ ROTATOR CUFF REPAIR Left 04/28/2022    Procedure: LEFT SHOULDER ARTHROSCOPY ,  SUBACROMIAL DECOMPRESSION, DISTAL CLAVICULECTOMY;  Surgeon: Stevo Zeng MD;  [place] [facility] [place] [facility] [place] [facility] [place]  Service: [place]opedics;  Laterality: Left;    TUBAL ABDOMINAL LIGATION      UPPER GASTROINTESTINAL ENDOSCOPY      WRIST SURGERY         Family History   Problem Relation Age of Onset    Dementia Mother     Stroke Mother     Stroke Father     Colon cancer Brother     Maldianne Hyperthermia Neg Hx         Current Medications        Current Outpatient Medications:     amLODIPine (NORVASC) 10 MG tablet, Take 1 tablet by mouth Daily., Disp: , Rfl:     aspirin 81 MG chewable tablet, Chew 1 tablet Daily., Disp: , Rfl:     diclofenac (VOLTAREN) 50 MG EC tablet, Take 1 tablet by mouth 2 (Two) Times a Day., Disp: , Rfl:     dicyclomine (BENTYL) 20 MG tablet, Take 1 tablet by mouth 4 (Four) Times a Day As Needed for Abdominal Cramping., Disp: 120 tablet, Rfl: 3    FLUoxetine (PROzac) 20 MG capsule, Take 2 capsules by mouth Daily., Disp: , Rfl:     gabapentin (NEURONTIN) 100 MG capsule, TAKE 2 CAPSULES 2 TIMES A DAY, Disp: , Rfl:     glimepiride (AMARYL) 4 MG tablet, Take 1 tablet by mouth 2 (Two) Times a Day., Disp: , Rfl:     Lantus SoloStar 100 UNIT/ML injection pen, INJECT 10 units UNDER THE SKIN ONCE DAILY for 7 days, then increase to 14 units DAILY, Disp: , Rfl:     loratadine (Claritin) 5 MG chewable tablet, Chew 1 tablet Daily., Disp: , Rfl:     losartan (COZAAR) 100 MG tablet, Take 1 tablet by mouth Daily., Disp: , Rfl:     omeprazole (priLOSEC) 20 MG capsule, Take 1 capsule by mouth Daily., Disp: , Rfl:     pravastatin (PRAVACHOL) 40 MG tablet, Take 1 tablet by mouth every night at bedtime., Disp: , Rfl:     simethicone (MYLICON) 80 MG chewable tablet, Chew 1 tablet Every 6 (Six) Hours As Needed for Flatulence., Disp: , Rfl:     valACYclovir (VALTREX) 500 MG tablet, Take 1 tablet by mouth Every 12 (Twelve) Hours., Disp: , Rfl:     vitamin D (ERGOCALCIFEROL) 1.25 MG (41044 UT) capsule capsule, Take 1 capsule by mouth 1  "(One) Time Per Week., Disp: , Rfl:     Aspercreme Lidocaine 4 % cream, apply to rectum FOUR TIMES DAILY AS NEEDED (Patient not taking: Reported on 10/3/2024), Disp: , Rfl:     colestipol (COLESTID) 1 g tablet, Take 1-2 tablets by mouth 2 (Two) Times a Day., Disp: 120 tablet, Rfl: 5    HYDROcodone-acetaminophen (NORCO) 7.5-325 MG per tablet, take 1 tablet by mouth every 12 hours as needed for 30 days, Disp: , Rfl:      Allergies     Allergies   Allergen Reactions    Amoxicillin-Pot Clavulanate GI Intolerance    Meloxicam Other (See Comments) and Rash     Severe body aches    Amoxicillin Rash    Cefdinir Diarrhea       Social History       Social History     Social History Narrative    Not on file       Immunizations     Immunization:  Immunization History   Administered Date(s) Administered    COVID-19 (PFIZER) Purple Cap Monovalent 03/25/2021, 12/08/2021          Objective     Objective     Vital Signs:   /74 (BP Location: Left arm, Patient Position: Sitting, Cuff Size: Adult)   Pulse 92   Ht 160 cm (62.99\")   Wt 78.6 kg (173 lb 3.2 oz)   BMI 30.69 kg/m²       Physical Exam  Constitutional:       General: She is not in acute distress.     Appearance: Normal appearance. She is well-developed and normal weight.   HENT:      Head: Normocephalic and atraumatic.   Eyes:      Conjunctiva/sclera: Conjunctivae normal.      Pupils: Pupils are equal, round, and reactive to light.      Visual Fields: Right eye visual fields normal and left eye visual fields normal.   Cardiovascular:      Rate and Rhythm: Normal rate.   Pulmonary:      Effort: Pulmonary effort is normal. No respiratory distress or retractions.      Breath sounds: Normal air entry.   Abdominal:      General: There is no distension.      Palpations: Abdomen is soft.      Tenderness: There is no abdominal tenderness.   Musculoskeletal:         General: Normal range of motion.      Right lower leg: No edema.      Left lower leg: No edema.   Skin:     " General: Skin is warm and dry.      Findings: No lesion.   Neurological:      General: No focal deficit present.      Mental Status: She is alert and oriented to person, place, and time.   Psychiatric:         Mood and Affect: Mood and affect normal.         Behavior: Behavior normal.         Results      Result Review :   The following data was reviewed by: LURDES Roberto on 10/03/2024:    CBC w/diff          11/14/2023    15:32 7/29/2024    19:39   CBC w/Diff   WBC 7.01  8.63    RBC 4.75  4.55    Hemoglobin 11.7  11.3    Hematocrit 38.5  36.2    MCV 81.1  79.6    MCH 24.6  24.8    MCHC 30.4  31.2    RDW 15.6  16.5    Platelets 311  354    Neutrophil Rel % 58.8  52.1    Immature Granulocyte Rel % 0.4  0.5    Lymphocyte Rel % 29.1  36.4    Monocyte Rel % 8.1  7.6    Eosinophil Rel % 2.6  2.1    Basophil Rel % 1.0  1.3      CMP          11/14/2023    15:32 4/26/2024    13:14 7/29/2024    19:39   CMP   Glucose 184   194    BUN 15   23    Creatinine 0.83  0.80  0.78    EGFR 77.4  80.4  82.9    Sodium 141   138    Potassium 3.6   3.8    Chloride 100   99    Calcium 9.3   9.3    Total Protein 7.5   7.2    Albumin 4.5   4.3    Globulin 3.0   2.9    Total Bilirubin 0.4   0.5    Alkaline Phosphatase 90   83    AST (SGOT) 36   35    ALT (SGPT) 33   27    Albumin/Globulin Ratio 1.5   1.5    BUN/Creatinine Ratio 18.1   29.5    Anion Gap 12.7   14.1        Lipase   Lipase   Date Value Ref Range Status   11/14/2023 28 13 - 60 U/L Final     7/29/2024 CT abdomen pelvis with contrast-diffuse hepatic steatosis is seen with hepatomegaly, as before.  No splenomegaly.  Previous cholecystectomy.  No acute findings in the abdomen or pelvis.    3/18/2021 colonoscopy (Dr. Norris)-normal mucosa noted throughout the colon.  Random colon biopsies negative for microscopic colitis.     Assessment and Plan        Assessment and Plan    Diagnoses and all orders for this visit:    1. Irritable bowel syndrome with diarrhea (Primary)  -      dicyclomine (BENTYL) 20 MG tablet; Take 1 tablet by mouth 4 (Four) Times a Day As Needed for Abdominal Cramping.  Dispense: 120 tablet; Refill: 3  -     colestipol (COLESTID) 1 g tablet; Take 1-2 tablets by mouth 2 (Two) Times a Day.  Dispense: 120 tablet; Refill: 5    2. Gastroesophageal reflux disease, unspecified whether esophagitis present      Given that she previously noticed improvement in diarrhea with bile acid sequestrant, recommend trial of Colestid.  Also recommend dicyclomine as needed for fecal urgency and abdominal cramping.  Continue omeprazole per primary care provider for control of heartburn.    Follow Up        Follow Up   Return in about 6 months (around 4/3/2025) for IBS, Diarrhea.  Patient was given instructions and counseling regarding her condition or for health maintenance advice. Please see specific information pulled into the AVS if appropriate.

## 2024-10-11 ENCOUNTER — TELEPHONE (OUTPATIENT)
Dept: CARDIAC SURGERY | Facility: CLINIC | Age: 69
End: 2024-10-11
Payer: MEDICARE

## 2024-11-21 ENCOUNTER — OFFICE VISIT (OUTPATIENT)
Dept: NEUROSURGERY | Facility: CLINIC | Age: 69
End: 2024-11-21
Payer: MEDICARE

## 2024-11-21 VITALS
WEIGHT: 181.1 LBS | SYSTOLIC BLOOD PRESSURE: 138 MMHG | HEIGHT: 63 IN | DIASTOLIC BLOOD PRESSURE: 81 MMHG | HEART RATE: 86 BPM | BODY MASS INDEX: 32.09 KG/M2

## 2024-11-21 DIAGNOSIS — M48.061 SPINAL STENOSIS AT L4-L5 LEVEL: ICD-10-CM

## 2024-11-21 DIAGNOSIS — M47.816 LUMBAR FACET ARTHROPATHY: ICD-10-CM

## 2024-11-21 DIAGNOSIS — M43.16 SPONDYLOLISTHESIS AT L4-L5 LEVEL: Primary | ICD-10-CM

## 2024-11-21 RX ORDER — OXYCODONE AND ACETAMINOPHEN 5; 325 MG/1; MG/1
1 TABLET ORAL 2 TIMES DAILY PRN
COMMUNITY
Start: 2024-11-16

## 2024-11-21 NOTE — PROGRESS NOTES
Patient being seen for today for Follow-up (Back feel like it is getting worse went to pain clinic had injection and does not feel better )  .    Subjective    Jada Butler is a 68 y.o. female that presents with Follow-up (Back feel like it is getting worse went to pain clinic had injection and does not feel better )  .    HPI  Previously: Last seen on 6/12/2024 for pain mostly in the back and hips.  She has some stenosis at L4-L5.  We discussed surgery was unlikely help back pain.  There was referral to pain management to discuss lumbar epidurals versus medial branch block/RFA.  She was referring for physical therapy in favor of gym exercise.    Today: She completed an RFA injection in the lumbar spine on 9/11/2024. She reports this was ineffective.    She reports pain in the back and down to the mid thighs in both legs.    She denies pain radiating further down the legs.     reports that she has quit smoking. She has been exposed to tobacco smoke. She has never used smokeless tobacco.    Review of Systems   Musculoskeletal:  Positive for back pain and myalgias (to the mid thighs).       Objective   Vitals:    11/21/24 1136   BP: 138/81   Pulse: 86        Physical Exam  Constitutional:       Appearance: Normal appearance. She is obese.   Pulmonary:      Effort: Pulmonary effort is normal.   Musculoskeletal:         General: Tenderness (right lumbar area) present.      Comments: SLR on the right worsens the back pain   Neurological:      General: No focal deficit present.      Mental Status: She is alert and oriented to person, place, and time.      Sensory: No sensory deficit.      Motor: No weakness.      Deep Tendon Reflexes: Reflexes normal.   Psychiatric:         Mood and Affect: Mood normal.         Behavior: Behavior normal.          Result Review   I have personally interpreted the MRI of the lumbar spine without contrast on 5/8/2024 which shows grade 1 anterolisthesis of L4 and L5.  There is moderate  bilateral foraminal narrowing at L4-L5.  There is severe central narrowing at this level.     Assessment and Plan {CC Problem List  Visit Diagnosis  ROS  Review (Popup)  [facility] Maintenance  Quality  BestPractice  Medications  SmartSets  SnapShot Encounters  Media :23}   Diagnoses and all orders for this visit:    1. Spondylolisthesis at L4-L5 level (Primary)  -     XR Spine Lumbar Complete With Flex & Ext; Future    2. Spinal stenosis at L4-L5 level    3. Lumbar facet arthropathy    She has pain in the back and both legs to the mid thigh.    She has severe stenosis at L4-L5.    We discussed surgery is less likely to help non-specific leg pain and typically would not help back pain.    She has tried RFA treatment recently without benefit.    There is anterolisthesis of L4 on L5 and I will order flexion and extension x-rays to assess for instability prior to considering a surgical approach.    Follow Up {Instructions Charge Capture  Follow-up Communications :23}   Return for Next available to discuss surgery.

## 2024-11-22 ENCOUNTER — HOSPITAL ENCOUNTER (OUTPATIENT)
Dept: GENERAL RADIOLOGY | Facility: HOSPITAL | Age: 69
Discharge: HOME | End: 2024-11-22
Payer: MEDICARE

## 2024-11-22 DIAGNOSIS — M43.16 SPONDYLOLISTHESIS AT L4-L5 LEVEL: ICD-10-CM

## 2024-11-22 PROCEDURE — 72114 X-RAY EXAM L-S SPINE BENDING: CPT

## 2024-12-05 ENCOUNTER — OFFICE VISIT (OUTPATIENT)
Dept: NEUROSURGERY | Facility: CLINIC | Age: 69
End: 2024-12-05
Payer: MEDICARE

## 2024-12-05 VITALS
DIASTOLIC BLOOD PRESSURE: 79 MMHG | WEIGHT: 182 LBS | HEART RATE: 90 BPM | BODY MASS INDEX: 32.25 KG/M2 | HEIGHT: 63 IN | SYSTOLIC BLOOD PRESSURE: 143 MMHG

## 2024-12-05 DIAGNOSIS — M43.16 SPONDYLOLISTHESIS AT L4-L5 LEVEL: ICD-10-CM

## 2024-12-05 DIAGNOSIS — M48.061 SPINAL STENOSIS AT L4-L5 LEVEL: Primary | ICD-10-CM

## 2024-12-05 DIAGNOSIS — M85.88 OTHER SPECIFIED DISORDERS OF BONE DENSITY AND STRUCTURE, OTHER SITE: ICD-10-CM

## 2024-12-05 DIAGNOSIS — M47.816 LUMBAR FACET ARTHROPATHY: ICD-10-CM

## 2024-12-05 RX ORDER — FLURBIPROFEN SODIUM 0.3 MG/ML
SOLUTION/ DROPS OPHTHALMIC
COMMUNITY
Start: 2024-12-04

## 2024-12-05 NOTE — PROGRESS NOTES
Patient being seen for today for Back Pain (Xrays 11/22/F/U/Pain level is about an 8)  .    Subjective    Jada Butler is a 68 y.o. female that presents with Back Pain (Xrays 11/22/F/U/Pain level is about an 8)  .    HPI  Previously: Last seen 11/21/2024 for pain in the back and both legs to the mid thigh.  She had severe central canal narrowing at L4-L5.  We discussed that surgery was less likely to help nonspecific leg pain and typically would not help back pain.  She had had a recent RFA treatment without benefit.  There was anterolisthesis of L4 on L5 and there was an order for flexion-extension x-rays to assess for instability prior to considering surgical approach.  There was plan to follow-up to discuss possible surgery with Dr. Cueva.    Today: She reports pain in the back and just below the knees today. She rates her pain 8/10 today. She denies new complaints.     reports that she has quit smoking. She has been exposed to tobacco smoke. She has never used smokeless tobacco.    Review of Systems   Musculoskeletal:  Positive for back pain and myalgias.       Objective   Vitals:    12/05/24 1054   BP: 143/79   Pulse: 90        Physical Exam  Constitutional:       Appearance: Normal appearance.   Pulmonary:      Effort: Pulmonary effort is normal.   Musculoskeletal:         General: Tenderness (lumbar area) present.      Comments: SLR negative bilaterally   Neurological:      General: No focal deficit present.      Mental Status: She is alert and oriented to person, place, and time.      Sensory: No sensory deficit.      Motor: No weakness.      Deep Tendon Reflexes: Reflexes normal.   Psychiatric:         Mood and Affect: Mood normal.         Behavior: Behavior normal.          Result Review   I have personally interpreted the MRI of the lumbar spine without contrast on 5/8/2024 which shows anterolisthesis of L4 and L5 with severe central canal narrowing at L4-L5.  There is moderate bilateral foraminal  narrowing at L4-L5.    I have personally interpreted the x-ray of the lumbar spine with flexion-extension views from 11/22/2024 which shows anterolisthesis of L4 and L5 with minimal worsening on flexion view.     Assessment and Plan {CC Problem List  Visit Diagnosis  ROS  Review (Popup)  [facility]  Quality  BestPractice  Medications  SmartSets  SnapShot Encounters  Media :23}   Diagnoses and all orders for this visit:    1. Spinal stenosis at L4-L5 level (Primary)  -     DEXA Bone Density Axial; Future    2. Spondylolisthesis at L4-L5 level  -     DEXA Bone Density Axial; Future    3. Lumbar facet arthropathy    4. Other specified disorders of bone density and structure, other site  -     DEXA Bone Density Axial; Future    She continues to report back pain extending into the legs and just past the knees today.    She has severe stenosis at L4-L5.    There is some anterolisthesis, but minimal change on flexion x-ray noted.    We again discussed that surgery typically would not help pain in the back and is less likely to be helpful in cases of non-specific leg.    She has failed recent RFA treatment.    Previous medication treatments of not offered any significant long term benefit.    She did discuss possible surgical approach with Dr. Cueva today considering the failure of more conservative treatment.    There was previous DEXA scan in 2021 showing low bone density in the hips. There was order today for DEXA scan to assess for osteoporosis in the spine prior to considering a possible lumbar fusion.    Follow Up {Instructions Charge Capture  Follow-up Communications :23}   Return if symptoms worsen or fail to improve.

## 2024-12-06 ENCOUNTER — TELEPHONE (OUTPATIENT)
Dept: CARDIAC SURGERY | Facility: CLINIC | Age: 69
End: 2024-12-06
Payer: MEDICARE

## 2024-12-06 NOTE — TELEPHONE ENCOUNTER
Third attempt to reach pt to try and r/s missed appt from 10/2/24. LVM and mailed out contact letter.

## 2024-12-06 NOTE — TELEPHONE ENCOUNTER
Pt returned call and spoke to Frannie at the HUB and stated she did not want to r/s at this time.

## 2024-12-26 ENCOUNTER — PATIENT MESSAGE (OUTPATIENT)
Dept: NEUROSURGERY | Facility: CLINIC | Age: 69
End: 2024-12-26
Payer: MEDICARE

## 2025-01-21 ENCOUNTER — HOSPITAL ENCOUNTER (OUTPATIENT)
Dept: BONE DENSITY | Facility: HOSPITAL | Age: 70
Discharge: HOME | End: 2025-01-21
Admitting: PHYSICIAN ASSISTANT
Payer: MEDICARE

## 2025-01-21 DIAGNOSIS — M43.16 SPONDYLOLISTHESIS AT L4-L5 LEVEL: ICD-10-CM

## 2025-01-21 DIAGNOSIS — M48.061 SPINAL STENOSIS AT L4-L5 LEVEL: ICD-10-CM

## 2025-01-21 DIAGNOSIS — M85.88 OTHER SPECIFIED DISORDERS OF BONE DENSITY AND STRUCTURE, OTHER SITE: ICD-10-CM

## 2025-01-21 PROCEDURE — 77080 DXA BONE DENSITY AXIAL: CPT

## 2025-01-21 NOTE — PROGRESS NOTES
We saw patient in December - you discussed possible lumbar fusion with her and ordered DEXA scan - it shows osteopenia in hips, normal lumbar spine.

## 2025-02-27 ENCOUNTER — OFFICE VISIT (OUTPATIENT)
Dept: NEUROSURGERY | Facility: CLINIC | Age: 70
End: 2025-02-27
Payer: MEDICARE

## 2025-02-27 VITALS
WEIGHT: 171 LBS | BODY MASS INDEX: 30.3 KG/M2 | DIASTOLIC BLOOD PRESSURE: 64 MMHG | SYSTOLIC BLOOD PRESSURE: 118 MMHG | HEIGHT: 63 IN | HEART RATE: 87 BPM

## 2025-02-27 DIAGNOSIS — M43.16 SPONDYLOLISTHESIS AT L4-L5 LEVEL: Primary | ICD-10-CM

## 2025-02-27 DIAGNOSIS — M48.061 SPINAL STENOSIS AT L4-L5 LEVEL: ICD-10-CM

## 2025-02-27 NOTE — PROGRESS NOTES
Jada Butler is a 69 y.o. female that presents with Follow-up (Discuss surgery )     History of Present Illness  The patient is a 69-year-old female who presents for evaluation of lower back pain.    She reports experiencing severe pain in her lower back, which radiates into her legs. The intensity of the pain is equal in both the back and legs. It is reported that she cries at night due to the pain. She has been managing her pain with Tylenol, which effectively induces sleep.    She has a history of diabetes, which is currently well-managed. Her hemoglobin A1c levels have been within normal limits. She was previously on metformin but discontinued it due to gastrointestinal side effects. She received steroid injections in her back, which resulted in a significant increase in her blood sugar levels, reaching up to 600. Consequently, she had to initiate insulin therapy. She expresses a desire to discontinue insulin but is unable to tolerate metformin.    SOCIAL HISTORY  She does not smoke.    MEDICATIONS  Current: diclofenac, aspirin, insulin, Tylenol  Past: metformin     Review of Systems   Musculoskeletal:  Positive for back pain (leg pain).        Vitals:    02/27/25 1454   BP: 118/64   Pulse: 87        Physical Exam  Constitutional:       Comments: BMI 30   Cardiovascular:      Comments: No notable edema    Pulmonary:      Effort: Pulmonary effort is normal.   Neurological:      Mental Status: She is alert.   Psychiatric:         Mood and Affect: Mood normal.        DEXA-osteopenia femoral neck, normal lumbar spine     Assessment and Plan {CC Problem List  Visit Diagnosis  ROS  Review (Popup)  Health Maintenance  Quality  BestPractice  Medications  SmartSets  SnapShot Encounters  Media :23}   Problem List Items Addressed This Visit       Spondylolisthesis at L4-L5 level - Primary    Relevant Orders    Case Request (Completed)    Follow Anesthesia Guidelines / Protocol    Spinal stenosis at L4-L5  level    Relevant Orders    Case Request (Completed)    Follow Anesthesia Guidelines / Protocol       Assessment & Plan  1. Lower back pain.  Her bone density in the hips and other areas is suboptimal, indicating osteopenia. However, the lumbar region shows satisfactory bone density. She has multilevel degenerative disc disease, which may limit the improvement of her back pain. The presence of disc bulges and stenosis contributes to her leg pain. A lumbar fusion at L4-L5 was discussed as a potential treatment option. The procedure involves a 2 to 4-day hospital stay, a recovery period of approximately 3 months, and a full recovery timeline of up to a year. The risks associated with the surgery, including nerve injury during screw placement, were thoroughly explained. She was advised to discontinue diclofenac one week prior to the surgery and for a duration of 6 weeks post-surgery. Aspirin should also be stopped one week before the procedure. Post-surgery, she will need to maintain good glycemic control to minimize the risk of infection.    2. Diabetes mellitus.  Her blood sugar levels were elevated 7 months ago, with readings of 201 and 256. She reports that her blood sugar levels have been better controlled since then. She is currently on insulin therapy after experiencing significant hyperglycemia following steroid injections. She was advised to continue monitoring her blood sugar levels closely, especially post-surgery, to reduce the risk of infection. She was also advised to avoid metformin due to previous gastrointestinal side effects.    PROCEDURE  The patient received steroid injections in her back previously, which resulted in a significant increase in her blood sugar levels.       Follow Up {Instructions Charge Capture  Follow-up Communications :23}   No follow-ups on file.      Patient or patient representative verbalized consent for the use of Ambient Listening during the visit with  Carlyle Cueva MD  for chart documentation. 2/27/2025  15:13 EST

## 2025-02-28 ENCOUNTER — TELEPHONE (OUTPATIENT)
Dept: NEUROLOGY | Facility: CLINIC | Age: 70
End: 2025-02-28
Payer: MEDICARE

## 2025-02-28 NOTE — TELEPHONE ENCOUNTER
----- Message from Carlyle Cueva sent at 2/27/2025  3:15 PM EST -----  Laminectomy and fusion L4-5 por favor.

## 2025-03-04 ENCOUNTER — TELEPHONE (OUTPATIENT)
Dept: OTOLARYNGOLOGY | Facility: CLINIC | Age: 70
End: 2025-03-04
Payer: MEDICARE

## 2025-03-04 NOTE — TELEPHONE ENCOUNTER
Left message for patient to call our office. We want to offer sooner appointment with our APRN.    HUB to Read: If patient would like to see APRN, please schedule for 1st available appointment.

## 2025-04-03 ENCOUNTER — TELEPHONE (OUTPATIENT)
Dept: NEUROSURGERY | Facility: CLINIC | Age: 70
End: 2025-04-03
Payer: MEDICARE

## 2025-04-03 DIAGNOSIS — M43.16 SPONDYLOLISTHESIS AT L4-L5 LEVEL: Primary | ICD-10-CM

## 2025-04-03 DIAGNOSIS — M47.816 LUMBAR FACET ARTHROPATHY: ICD-10-CM

## 2025-04-03 DIAGNOSIS — M48.061 SPINAL STENOSIS AT L4-L5 LEVEL: ICD-10-CM

## 2025-04-03 NOTE — TELEPHONE ENCOUNTER
Left patient a message to call back. Ok for hub to read.    Insurance is currently denying your surgery due to the fact that you have not had 6 weeks of recent Physical Therapy as well as a negative Nicotine test.  We have placed a referral to PT as well as a lab order for the nicotine test in which you can go to any of the [facility] labs for.  Once we have these records for your insurance they will hopefully approve.

## 2025-04-07 NOTE — TELEPHONE ENCOUNTER
Pt called in requesting information on where PT was sent and where nicotine lab can be completed. Adv PT referral went to [facility] and can go to any outpatient facility to have labs completed

## 2025-04-07 NOTE — TELEPHONE ENCOUNTER
Spoke to patient and .  They stated that they never received message about sx canceled.  I apologized and informed them of what needed to be done for her insurance to approve and cover sx.  Patient voiced understanding.

## 2025-04-07 NOTE — TELEPHONE ENCOUNTER
SAME DAY SURGERY CALLED BECAUSE PT IS SITTING IN WAITING ROOM STATING SHE DID NOT CANCEL. I READ ENCOUNTER, THEY TO RELAY TO PT.

## 2025-04-11 ENCOUNTER — CLINICAL SUPPORT (OUTPATIENT)
Dept: FAMILY MEDICINE CLINIC | Facility: CLINIC | Age: 70
End: 2025-04-11
Payer: MEDICARE

## 2025-04-11 DIAGNOSIS — M43.16 SPONDYLOLISTHESIS AT L4-L5 LEVEL: ICD-10-CM

## 2025-04-11 DIAGNOSIS — M48.061 SPINAL STENOSIS AT L4-L5 LEVEL: ICD-10-CM

## 2025-04-11 DIAGNOSIS — M47.816 LUMBAR FACET ARTHROPATHY: ICD-10-CM

## 2025-04-11 LAB — COTININE UR-MCNC: NEGATIVE NG/ML

## 2025-04-11 PROCEDURE — G0480 DRUG TEST DEF 1-7 CLASSES: HCPCS | Performed by: NEUROLOGICAL SURGERY

## 2025-04-28 ENCOUNTER — TREATMENT (OUTPATIENT)
Dept: PHYSICAL THERAPY | Facility: CLINIC | Age: 70
End: 2025-04-28
Payer: MEDICARE

## 2025-04-28 DIAGNOSIS — M54.42 CHRONIC BILATERAL LOW BACK PAIN WITH BILATERAL SCIATICA: ICD-10-CM

## 2025-04-28 DIAGNOSIS — M54.41 CHRONIC BILATERAL LOW BACK PAIN WITH BILATERAL SCIATICA: ICD-10-CM

## 2025-04-28 DIAGNOSIS — M43.16 ANTEROLISTHESIS OF LUMBAR SPINE: Primary | ICD-10-CM

## 2025-04-28 DIAGNOSIS — G89.29 CHRONIC BILATERAL LOW BACK PAIN WITH BILATERAL SCIATICA: ICD-10-CM

## 2025-04-28 PROCEDURE — 97161 PT EVAL LOW COMPLEX 20 MIN: CPT | Performed by: PHYSICAL THERAPIST

## 2025-04-28 PROCEDURE — 97110 THERAPEUTIC EXERCISES: CPT | Performed by: PHYSICAL THERAPIST

## 2025-04-28 NOTE — PROGRESS NOTES
Physical Therapy Initial Evaluation and Plan of Care                    [facility] [address] [place] [address]    Patient: Jada Butler   : 1955  Diagnosis/ICD-10 Code:  Anterolisthesis of lumbar spine [M43.16]  Referring practitioner: Carlyle Cueva MD  Date of Initial Visit: 2025  Today's Date: 2025  Patient seen for 1 sessions           Subjective Questionnaire: Oswestry: 32/45 = 71.1% Disability    Subjective Evaluation    History of Present Illness  Mechanism of injury: The patient presents to physical therapy with complaints of low back pain with bilateral radicular pains into her lower extremities. The pain started about a year ago without a mechanism of injury and gradually increased over time. She is currently taking Oxycodone and gabapentin for pain management. Her pain is increased with walking, sometimes if she sits the wrong way. Her pain is improved some with laying down on a heating pad. She had a lumbar MRI which revealed an anterolisthesis and stenosis of L4-5. She was scheduled for a lumbar fusion, but it was denied by insurance until she tried PT.      Patient Occupation: Retired Pain  Current pain ratin  At worst pain rating: 10    Diagnostic Tests  MRI studies: abnormal    Patient Goals  Patient goals for therapy: decreased pain, increased strength, independence with ADLs/IADLs and increased motion           Past Medical History:   Diagnosis Date    Anesthesia     pt states she has been slow at waking, but has also woke up during colonoscopy. Also states she  acts silly for a few days afterwards.    Arthritis     Bilateral carpal tunnel syndrome 2023    HAS HAD LEFT CARPAL TUNNEL REPAIR    Diabetes mellitus     DOES NOT CHECK BS DAILY    Essential tremor 2023    Fatty liver     no current issues    GERD (gastroesophageal reflux disease)     Hiatal hernia     History of chest pain     SAW DR DEL VALLE IN  CARDIAC TESTING (-). HE RELEASED  HER FELT NON CARDIAC ISSUE. DENIES CURRENT CP/SOA. FOLLOWED BY PCP. DECREASED ACTIVTY D/T BACK PAIN AND INSTABILITY OF CRISTINA LEGS    Hyperlipidemia     Hypertension     IBS (irritable bowel syndrome)     Impingement syndrome of left shoulder     Neuropathy in diabetes     Pain management     [facility]    PONV (postoperative nausea and vomiting)     Shingles     HAS HAD 2 EPISODES OF SHINGLES AND DENIES ANY CURRENT ISSUES      Past Surgical History:   Procedure Laterality Date    BACK SURGERY      X2    BREAST SURGERY      breast reduction    CARPAL TUNNEL RELEASE Left 05/11/2023    Procedure: CARPAL TUNNEL RELEASE;  Surgeon: Stevo Zeng MD;  Location: [facility];  Service: Orthopedics;  Laterality: Left;    CHOLECYSTECTOMY      COLONOSCOPY      HERNIA REPAIR      HYSTERECTOMY      KNEE SURGERY      NOSE SURGERY      x2    SHOULDER ARTHROSCOPY W/ ROTATOR CUFF REPAIR Left 04/28/2022    Procedure: LEFT SHOULDER ARTHROSCOPY , SUBACROMIAL DECOMPRESSION, DISTAL CLAVICULECTOMY;  Surgeon: Stevo Zeng MD;  Location: [facility];  Service: Orthopedics;  Laterality: Left;    TUBAL ABDOMINAL LIGATION      UPPER GASTROINTESTINAL ENDOSCOPY         Objective          Tenderness     Additional Tenderness Details  Tenderness bilaterally in lumbar paraspinals and over lumbar spinous processes.    Neurological Testing     Additional Neurological Details  Sensation to light touch intact and equal bilaterally from L2-S1 dermatomal pattern.    Seated slump: (+) bilaterally for increased sciatic neural tension.      Active Range of Motion     Lumbar   Flexion: 60 degrees   Extension: 5 degrees with pain  Left rotation: Active left lumbar rotation: 40% with pain  Right rotation: Active right lumbar rotation: 25% with pain    Strength/Myotome Testing     Left Hip   Planes of Motion   Flexion: 2+    Right Hip   Planes of Motion   Flexion: 2+    Left Knee   Flexion: 4+  Extension: 4+    Right Knee   Flexion: 4+  Extension:  4+    Left Ankle/Foot   Dorsiflexion: 4+    Right Ankle/Foot   Dorsiflexion: 4+    Ambulation     Ambulation: Level Surfaces     Additional Level Surfaces Ambulation Details  The patient ambulates with slow rashi and bilateral trendelenburg gait.      See Exercise, Manual, and Modality Logs for complete treatment.     Assessment & Plan       Assessment  Impairments: abnormal gait, abnormal muscle firing, abnormal muscle tone, abnormal or restricted ROM, activity intolerance, impaired physical strength, lacks appropriate home exercise program and pain with function   Functional limitations: carrying objects, lifting, sleeping, walking, pulling, pushing, uncomfortable because of pain, moving in bed, sitting, standing, stooping and unable to perform repetitive tasks   Assessment details: The patient presents to physical therapy with complaints of low back pain with bilateral radicular pains which has gradually worsened over the past year. Her neurosurgeon has recommended a lumbar fusion, but insurance denied the surgery until she tried physical therapy. She presents with associated lumbar stiffness, increased sciatic neural tension, hip weakness, tenderness to palpation, and functional deficits (NOBLE). She would benefit from skilled physical therapy as described below to address these limitations and allow the patient to return to her prior level of function.   Prognosis: good    Goals  Plan Goals: LOW BACK PROBLEMS:    1. The patient complains of low back pain.  LTG 1: 12 weeks:  The patient will report a pain rating of 1/10 or better in order to improve  tolerance to activities of daily living and improve sleep quality.  STATUS:  New  STG 1a: 6 weeks:  The patient will report a pain rating of 3/10 or better.  STATUS:  New    2. The patient demonstrates weakness of the bilateral hips.  LTG 2: 12 weeks:  The patient will demonstrate 4 /5 strength for bilateral hip flexion, abduction,  and extension in order to improve  hip stability.  STATUS:  New  STG 2a: 6 weeks:  The patient will demonstrate 4- /5 strength for bilateral hip flexion, abduction,  and extension.  STATUS:  New    3. The patient has limited lumbar AROM  LTG 3: 12 weeks:  The patient will demonstrate lumbar AROM as follows: 60 of flexion and 15 degrees of extension.  STATUS:  New  STG 3a: 6 weeks: The patient will demonstrate lumbar AROM as follows: 55 of flexion and 10 degrees of extension.  STATUS:  New    4. Mobility: Walking/Moving Around Functional Limitation    LTG 4: 12 weeks:  The patient will demonstrate 22.2 % limitation by achieving a score of 10/45 on the NOBLE.  STATUS:  New  STG 4 a: 6 weeks:  The patient will demonstrate 33.3 % limitation by achieving a score of 15/45 on the NOBLE.    STATUS:  New     Plan  Therapy options: will be seen for skilled therapy services  Planned modality interventions: cryotherapy, electrical stimulation/Russian stimulation, TENS, dry needling and traction  Planned therapy interventions: balance/weight-bearing training, ADL retraining, soft tissue mobilization, strengthening, stretching, therapeutic activities, joint mobilization, home exercise program, functional ROM exercises, flexibility, body mechanics training, postural training, neuromuscular re-education, manual therapy, abdominal trunk stabilization, IADL retraining and spinal/joint mobilization  Frequency: 2x week  Duration in weeks: 12  Treatment plan discussed with: patient        Visit Diagnoses:    ICD-10-CM ICD-9-CM   1. Anterolisthesis of lumbar spine  M43.16 756.12   2. Chronic bilateral low back pain with bilateral sciatica  M54.42 724.2    M54.41 724.3    G89.29 338.29       History # of Personal Factors and/or Comorbidities: MODERATE (1-2)  Examination of Body System(s): # of elements: LOW (1-2)  Clinical Presentation: EVOLVING  Clinical Decision Making: LOW       Timed:         Manual Therapy:    0     mins  36918;     Therapeutic Exercise:    10     mins   22773;     Neuromuscular Elly:    0    mins  74519;    Therapeutic Activity:     0     mins  91664;     Gait Trainin     mins  17983;     Ultrasound:     0     mins  96817;    Ionto                               0    mins   83567  Self Care                       0     mins   25052  Canalith Repos    0     mins 96384      Un-Timed:  Electrical Stimulation:    0     mins  14765 ( );  Dry Needling     0     mins self-pay  Traction     0     mins 24640  Low Eval     25     Mins  49541  Mod Eval     0     Mins  45107  High Eval                       0     Mins  07679  Re-Eval                           0    mins  29546    Timed Treatment:   10   mins   Total Treatment:     35   mins    PT SIGNATURE: Blaine Nuñez PT    Electronically signed 2025    [place] License: PT - [id number]      Initial Certification  Certification Period: 2025 thru 2025  I certify that the therapy services are furnished while this patient is under my care.  The services outlined above are required by this patient, and will be reviewed every 90 days.     PHYSICIAN: Carlyle Cueva MD  NPI: [id number]      DATE:     Please sign and return via fax to [phone]. Thank you, [facility].

## 2025-05-05 ENCOUNTER — TREATMENT (OUTPATIENT)
Dept: PHYSICAL THERAPY | Facility: CLINIC | Age: 70
End: 2025-05-05
Payer: MEDICARE

## 2025-05-05 DIAGNOSIS — M54.41 CHRONIC BILATERAL LOW BACK PAIN WITH BILATERAL SCIATICA: ICD-10-CM

## 2025-05-05 DIAGNOSIS — G89.29 CHRONIC BILATERAL LOW BACK PAIN WITH BILATERAL SCIATICA: ICD-10-CM

## 2025-05-05 DIAGNOSIS — M43.16 ANTEROLISTHESIS OF LUMBAR SPINE: Primary | ICD-10-CM

## 2025-05-05 DIAGNOSIS — M54.42 CHRONIC BILATERAL LOW BACK PAIN WITH BILATERAL SCIATICA: ICD-10-CM

## 2025-05-05 PROCEDURE — 97110 THERAPEUTIC EXERCISES: CPT | Performed by: PHYSICAL THERAPIST

## 2025-05-05 PROCEDURE — 97530 THERAPEUTIC ACTIVITIES: CPT | Performed by: PHYSICAL THERAPIST

## 2025-05-05 NOTE — PROGRESS NOTES
[facility]                              Physical Therapy Daily Treatment Note    Patient: Jada Butler   : 1955  Diagnosis/ICD-10 Code:  Anterolisthesis of lumbar spine [M43.16]  Referring practitioner: Carlyle Cueva MD  Date of Initial Visit: Type: THERAPY  Noted: 2025  Today's Date: 2025  Patient seen for 2 sessions           Subjective   Jada Butler reports: her back is always hurting. Pt states the exercises at home kills her and she does them every other day.       Objective     See Exercise, Manual, and Modality Logs for complete treatment.     Assessment/Plan  Patient presents to therapy with moderate pain in her low back. Pt was able to complete some exercises today with minimal complaints of discomfort. Will continue to progress patient per POC and patient tolerance in order to improve upon functional daily activities.    Progress per Plan of Care         Timed:  Manual Therapy:         mins  16911;  Therapeutic Exercise:    14     mins  39034;     Neuromuscular Elly:        mins  68318;    Therapeutic Activity:     12     mins  92033;     Gait Training:           mins  23022;        Untimed:  Electrical Stimulation:         mins  53268 ( );  Mechanical Traction:         mins  40348;       Timed Treatment:   26   mins   Total Treatment:     26   mins      Electronically signed:     Opal Modi PTA  Physical Therapist Assistant  [facility] License #: [id number]

## 2025-05-13 ENCOUNTER — TREATMENT (OUTPATIENT)
Dept: PHYSICAL THERAPY | Facility: CLINIC | Age: 70
End: 2025-05-13
Payer: MEDICARE

## 2025-05-13 DIAGNOSIS — M54.42 CHRONIC BILATERAL LOW BACK PAIN WITH BILATERAL SCIATICA: ICD-10-CM

## 2025-05-13 DIAGNOSIS — G89.29 CHRONIC BILATERAL LOW BACK PAIN WITH BILATERAL SCIATICA: ICD-10-CM

## 2025-05-13 DIAGNOSIS — M43.16 ANTEROLISTHESIS OF LUMBAR SPINE: Primary | ICD-10-CM

## 2025-05-13 DIAGNOSIS — M54.41 CHRONIC BILATERAL LOW BACK PAIN WITH BILATERAL SCIATICA: ICD-10-CM

## 2025-05-13 NOTE — PROGRESS NOTES
Physical Therapy Daily Treatment Note                      [facility] [address] [place] [address]    Patient: Jada Butler   : 1955  Diagnosis/ICD-10 Code:  Anterolisthesis of lumbar spine [M43.16]  Referring practitioner: Carlyle Cueva MD  Date of Initial Visit: Type: THERAPY  Noted: 2025  Today's Date: 2025  Patient seen for 3 sessions           Subjective   The patient reported that her back pain today is a 6/10 with bilateral radicular pains distal to knee level. She has tried to perform her home exercise, but her pain is so bad that she gets nauseous. She had intense pain with very light exercise at her last PT session. She feels like PT is making her back worse.    Objective   Tenderness      Additional Tenderness Details  Tenderness bilaterally in lumbar paraspinals and over lumbar spinous processes.     Neurological Testing      Additional Neurological Details  Sensation to light touch intact and equal bilaterally from L2-S1 dermatomal pattern.     Seated slump: (+) bilaterally for increased sciatic neural tension.        Active Range of Motion      Lumbar   Flexion: 60 degrees   Extension: 5 degrees with pain  Left rotation: Active left lumbar rotation: 40% with pain  Right rotation: Active right lumbar rotation: 25% with pain     Strength/Myotome Testing      Left Hip   Planes of Motion   Flexion: 2+     Right Hip   Planes of Motion   Flexion: 2+     Left Knee   Flexion: 4+  Extension: 4+     Right Knee   Flexion: 4+  Extension: 4+     Left Ankle/Foot   Dorsiflexion: 4+     Right Ankle/Foot   Dorsiflexion: 4+     Ambulation      Ambulation: Level Surfaces      Additional Level Surfaces Ambulation Details  The patient ambulates with slow rashi and bilateral trendelenburg gait.     See Exercise, Manual, and Modality Logs for complete treatment.     Assessment/Plan    The patient demonstrated poor tolerance to light resistance training, stretching and manual  therapy. All interventions are limited by pain. Due to lack of tolerance to physical therapy interventions, she is not an appropriate candidate for ongoing physical therapy at this time. She will be placed on hold and I recommend that she follow back up with neurosurgery to proceed with further treatment options.         Timed:  Manual Therapy:    10     mins  90299;  Therapeutic Exercise:    8     mins  95722;     Neuromuscular Elly:   0    mins  99324;    Therapeutic Activity:     8     mins  11371;     Gait Trainin     mins  71172;     Aquatics                         0      mins  27063    Un-timed:  Mechanical Traction      0     mins  15620  Dry Needling     0     mins self-pay  Electrical Stimulation:    0     mins  04946 ( );      Timed Treatment:   26   mins   Total Treatment:     26   mins    Blaine Nuñez PT  Physical Therapist    Electronically signed 2025    [place] License: PT - [id number]

## 2025-05-29 ENCOUNTER — TELEPHONE (OUTPATIENT)
Dept: NEUROSURGERY | Facility: CLINIC | Age: 70
End: 2025-05-29
Payer: MEDICARE

## 2025-05-29 NOTE — TELEPHONE ENCOUNTER
Patient was scheduled for surgery but was cancelled because of not having physical therapy, she now has completed physical therapy and would like to reschedule surgery    Please call patient at [phone]

## 2025-05-30 ENCOUNTER — PREP FOR SURGERY (OUTPATIENT)
Dept: OTHER | Facility: HOSPITAL | Age: 70
End: 2025-05-30
Payer: MEDICARE

## 2025-05-30 DIAGNOSIS — M48.061 SPINAL STENOSIS AT L4-L5 LEVEL: ICD-10-CM

## 2025-05-30 DIAGNOSIS — M43.16 SPONDYLOLISTHESIS AT L4-L5 LEVEL: Primary | ICD-10-CM

## 2025-05-30 PROBLEM — Z98.1 STATUS POST LUMBAR SPINAL FUSION: Status: ACTIVE | Noted: 2025-05-30

## 2025-06-05 ENCOUNTER — OFFICE VISIT (OUTPATIENT)
Dept: NEUROLOGY | Facility: CLINIC | Age: 70
End: 2025-06-05
Payer: MEDICARE

## 2025-06-05 ENCOUNTER — TELEPHONE (OUTPATIENT)
Dept: NEUROSURGERY | Facility: CLINIC | Age: 70
End: 2025-06-05
Payer: MEDICARE

## 2025-06-05 VITALS
HEART RATE: 96 BPM | SYSTOLIC BLOOD PRESSURE: 111 MMHG | HEIGHT: 63 IN | BODY MASS INDEX: 32.07 KG/M2 | DIASTOLIC BLOOD PRESSURE: 63 MMHG | WEIGHT: 181 LBS

## 2025-06-05 DIAGNOSIS — G25.2 DYSTONIC TREMOR: ICD-10-CM

## 2025-06-05 DIAGNOSIS — G20.C PARKINSONIAN TREMOR: Primary | ICD-10-CM

## 2025-06-05 DIAGNOSIS — G25.0 ESSENTIAL TREMOR: ICD-10-CM

## 2025-06-05 PROCEDURE — 1159F MED LIST DOCD IN RCRD: CPT | Performed by: PSYCHIATRY & NEUROLOGY

## 2025-06-05 PROCEDURE — 1160F RVW MEDS BY RX/DR IN RCRD: CPT | Performed by: PSYCHIATRY & NEUROLOGY

## 2025-06-05 PROCEDURE — 99214 OFFICE O/P EST MOD 30 MIN: CPT | Performed by: PSYCHIATRY & NEUROLOGY

## 2025-06-05 NOTE — PROGRESS NOTES
Chief Complaint  Tremors (BUE, R>L )    Subjective          Jada Butler is a 69 y.o. female who presents to [facility]  History of Present Illness      History of Present Illness  The patient presents for evaluation of tremors.    She reports experiencing tremors in both hands, with a noted exacerbation since her last visit. The tremor initially presented in her left hand before progressing to the right. The tremors are present during activities such as eating and drinking, and have become so severe that she is no longer able to cook due to safety concerns. The tremors also disrupt her sleep, particularly when she is attempting to fall asleep at night. The tremors are persistent throughout the day and have been present for over a decade. They interfere with her daily activities, including playing on her phone and carrying objects. She is not currently on any medication for the tremors. She has not undergone a DaTscan. She does not experience any slowness or stiffness associated with Parkinson's disease. She has previously tried primidone and propranolol, but these medications were not well-tolerated.    I saw her September 2023 she had a left hand tremor at that time that been ongoing for the last 7 to 8 years at that time that I interfere with her activities of daily living such as playing games on her phone as well as other activities especially carrying things.  She states that the right hand is a newer tremor in the last 2 years.  When I saw her in 2023 there was no significant tremor noted on finger-to-nose testing as well as Archimedes spiral or transferring water from 1 cup to the next.  She did not have any tremor noted trying to drink out of an empty cup.      INTERVAL: Since last visit, the tremors have worsened.    SOCIAL HISTORY:    Sleep: The tremors disrupt her sleep, particularly when she is attempting to fall asleep at night.      MEDICATIONS  PREVIOUS  "MEDS:  Primidone  Propranolol  Reason for Discontinuation: Made the patient tired      Objective   Vital Signs:   /63 (BP Location: Left arm, Patient Position: Sitting, Cuff Size: Large Adult)   Pulse 96   Ht 160 cm (62.99\")   Wt 82.1 kg (181 lb)   BMI 32.07 kg/m²     Physical Exam   Alert, fluent, phasic, follows commands well.  She does not have any facial hypomimia.  There is a consistent resting tremor in the right hand Parkinson frequency as well as her right leg.  There is a mild tremor noted in her left hand.  There is no significant tremor noted on Archimedes spiral in the right hand and the tremor actually improves when she moves.  With hands extended there is no tremor initially and then there is a latency period of a few seconds and then she has the parkinsonian tremor again.  There is no rigidity bilaterally.  On station gait there is decreased armswing bilaterally and there is a resting tremor noted the right hand.  Turning is intact.  Sitting is intact.     Results           Assessment and Plan  Diagnoses and all orders for this visit:    1. Parkinsonian tremor (Primary)    2. Dystonic tremor    3. Essential tremor         Assessment & Plan  1. Tremor.  The patient reports a long-standing history of tremors, initially affecting the left hand and now both hands, which has worsened over time. The tremor is present during activities such as eating, drinking, and carrying objects, and also at rest. It significantly affects her daily activities and sleep. Previous medications, including primidone and propranolol, were not tolerated due to side effects. A DaTscan will be scheduled to differentiate between essential tremor and Parkinson's tremor.  If the DaTscan is normal, it will confirm essential tremor orSWEDD (scan without evidence of dopamine deficit). If abnormal, treatment options for Parkinson's disease will be considered. A referral to Dr. Wooten at the [facility] for further " evaluation and potential deep brain stimulation or thalamotomy has been made.      Total time spent with the patient and coordinating patient care was 35 minutes.    Follow Up  No follow-ups on file.  Patient was given instructions and counseling regarding her condition or for health maintenance advice. Please see specific information pulled into the AVS if appropriate.     Patient or patient representative verbalized consent for the use of Ambient Listening during the visit with  Chai Farrell MD for chart documentation. 6/5/2025  15:48 EDT

## 2025-06-07 NOTE — TELEPHONE ENCOUNTER
Caller: HUNTER    Relationship to patient: SELF    Best call back number: [phone]    Patient is needing: PATIENT STATES THAT SHE HAD LEFT SURGERY 4-5 WEEKS AGO AND IS STILL IN A LOT OF PAIN. IS THIS NORMAL? SHOULD SHE BEEN SEEN SOONER? PLEASE ADVISE. ATTEMPTED TO WARM TRANSFER BUT THERE WAS NO ANSWER.            impaired gait due to dizziness

## 2025-06-17 ENCOUNTER — TELEPHONE (OUTPATIENT)
Dept: NEUROLOGY | Facility: CLINIC | Age: 70
End: 2025-06-17
Payer: MEDICARE

## 2025-06-17 NOTE — TELEPHONE ENCOUNTER
Caller: Jada Butler    Relationship: Self    Best call back number: [phone]    What is the best time to reach you: ANY TIME    What was the call regarding: PT SAYS THEY MISSED A CALL, BUT NO ENCOUNTERS ABOUT THE CALL    PLEASE REVIEW AND ADVISE

## 2025-07-15 ENCOUNTER — TELEPHONE (OUTPATIENT)
Dept: NEUROSURGERY | Facility: CLINIC | Age: 70
End: 2025-07-15
Payer: MEDICARE

## 2025-07-15 NOTE — TELEPHONE ENCOUNTER
Received message from financial clearance that patient's surgery has been denied due to conservative treatment. Patient did attempt to complete physical therapy. Attempted to contact insurance to appeal, with PT notes, but was advised case is still pending review. Sent message back to financial clearance for clarification.

## 2025-07-16 NOTE — TELEPHONE ENCOUNTER
Spoke with Dorie this morning. Per Barbi, peer to peer was due yesterday by 4:30 p.m. Was advised appeal must be initiated. Was transferred to [facility] with expedited appeals. New clinical information provided and faxed as well to [phone] with new reference# [id number].

## 2025-07-17 NOTE — TELEPHONE ENCOUNTER
Received fax from [employer] this morning denying expedited appeal. Appeal will be standard 30 day turnaround time.

## 2025-07-17 NOTE — TELEPHONE ENCOUNTER
Received request for medical records (scanned to chart) regarding surgical procedure. Records sent back with form.

## 2025-07-18 NOTE — TELEPHONE ENCOUNTER
Spoke with patient. She spoke with financial clearance and would like to wait for decision from insurance before proceeding. Her surgery has been canceled for the time being, and will try to reschedule once approved.

## 2025-07-18 NOTE — TELEPHONE ENCOUNTER
Per Barbi, she has tried to contact the patient twice regarding. I spoke with financial clearance this afternoon, they will attempt to contact patient to see if she would like to postpone or proceed with surgery waiver.

## 2025-07-28 ENCOUNTER — TELEPHONE (OUTPATIENT)
Dept: NEUROLOGY | Facility: CLINIC | Age: 70
End: 2025-07-28
Payer: MEDICARE

## 2025-07-28 NOTE — TELEPHONE ENCOUNTER
DELETE AFTER REVIEWING: Send this encounter to the appropriate pool. See your Call Action Grid or Workflows for direction.    Caller: Jada Butler    Relationship: Self    Best call back number:   Telephone Information:   Mobile [phone]     Caller requesting test results:     What test was performed:   ANDREA SCAM    When was the test performed:   7-17-25    Where was the test performed:   [facility]    Additional notes:  PLEASE CALL PT WITH RESULTS

## 2025-08-13 ENCOUNTER — TELEPHONE (OUTPATIENT)
Dept: NEUROSURGERY | Facility: CLINIC | Age: 70
End: 2025-08-13
Payer: MEDICARE

## 2025-08-26 ENCOUNTER — TELEPHONE (OUTPATIENT)
Dept: ORTHOPEDIC SURGERY | Facility: CLINIC | Age: 70
End: 2025-08-26
Payer: MEDICARE

## (undated) DEVICE — PAD GRND REM POLYHESIVE A/ DISP

## (undated) DEVICE — BUR BRL FORMLA 12FLUT 4MM

## (undated) DEVICE — BLD CUT FORMLA AGGR PLS 4.0MM

## (undated) DEVICE — GAUZE,SPONGE,4"X4",16PLY,STRL,LF,10/TRAY: Brand: MEDLINE

## (undated) DEVICE — EXTREMITY-LF: Brand: MEDLINE INDUSTRIES, INC.

## (undated) DEVICE — SLV DISTRACTION STAR VELCRO XL DISP

## (undated) DEVICE — COLD THERAPY BLANKET: Brand: DEROYAL

## (undated) DEVICE — INTENDED FOR TISSUE SEPARATION, AND OTHER PROCEDURES THAT REQUIRE A SHARP SURGICAL BLADE TO PUNCTURE OR CUT.: Brand: BARD-PARKER ® CARBON RIB-BACK BLADES

## (undated) DEVICE — GLV SURG ULTRAFREE MAX LTX PF 8

## (undated) DEVICE — STERILE POLYISOPRENE POWDER-FREE SURGICAL GLOVES: Brand: PROTEXIS

## (undated) DEVICE — COMFORT ARM SLING: Brand: DEROYAL

## (undated) DEVICE — GLV SURG SENSICARE SLT PF LF 7 STRL

## (undated) DEVICE — ELECTRD BLD EDGE COAT 3IN

## (undated) DEVICE — GLV SURG SENSICARE W/ALOE PF LF 7 STRL

## (undated) DEVICE — DRSNG GZ PETROLTM XEROFORM CURAD 1X8IN STRL

## (undated) DEVICE — UNDERCAST PADDING: Brand: DEROYAL

## (undated) DEVICE — SUT ETHLN 4/0 FS2 18IN 662H

## (undated) DEVICE — FMS FLUID MANAGEMENT SYSTEM OUTFLOW TUBING WITHOUT ONE-WAY VALVE (FMS VUE OR FMS DUO PLUS): Brand: FMS

## (undated) DEVICE — MAT FLR ABS W/BLU/LINER 56X72IN WHT

## (undated) DEVICE — NDL HYPO ECLPS SFTY 22G 1 1/2IN

## (undated) DEVICE — SUT ETHLN 3-0 FS118IN 663H

## (undated) DEVICE — SHOULDER ARTHROSCOPY-LF: Brand: MEDLINE INDUSTRIES, INC.

## (undated) DEVICE — 90-S CRUISE, SUCTION PROBE, NON-BENDABLE, MAX CUT LEVEL 1: Brand: SERFAS ENERGY

## (undated) DEVICE — [THREADED CANNULA.  DO NOT RESTERILIZE,  DO NOT USE IF PACKAGE IS DAMAGED]: Brand: DRI-LOK

## (undated) DEVICE — BNDG ELAS ECON W/CLIP 4IN 5YD LF STRL

## (undated) DEVICE — BLD OPTH BEAVER/MINIBLADE STR 180DEG DBL/BVL SS

## (undated) DEVICE — FMS FLUID MANAGEMENT SYSTEM INFLOW TUBING (FMS VUE): Brand: FMS

## (undated) DEVICE — PENCL E/S SMOKEEVAC W/TELESCP CANN

## (undated) DEVICE — SUT VIC UD BR COAT 0 CP2 27IN

## (undated) DEVICE — BNDG ESMARK 4IN 12FT LF STRL BLU

## (undated) DEVICE — SOL IRR NACL 0.9PCT 3000ML

## (undated) DEVICE — UNDYED BRAIDED (POLYGLACTIN 910), SYNTHETIC ABSORBABLE SUTURE: Brand: COATED VICRYL

## (undated) DEVICE — COLD THERAPY WRAP: Brand: DEROYAL